# Patient Record
Sex: FEMALE | Race: WHITE | NOT HISPANIC OR LATINO | Employment: FULL TIME | URBAN - METROPOLITAN AREA
[De-identification: names, ages, dates, MRNs, and addresses within clinical notes are randomized per-mention and may not be internally consistent; named-entity substitution may affect disease eponyms.]

---

## 2017-01-13 ENCOUNTER — TELEPHONE (OUTPATIENT)
Dept: OBSTETRICS AND GYNECOLOGY | Facility: CLINIC | Age: 52
End: 2017-01-13

## 2017-01-13 DIAGNOSIS — Z92.89 HISTORY OF SCREENING MAMMOGRAPHY: Primary | ICD-10-CM

## 2017-01-13 NOTE — TELEPHONE ENCOUNTER
----- Message from Michelet Hicks sent at 1/13/2017  1:42 PM CST -----  Contact: pt  x_ 1st Request   _ 2nd Request   _ 3rd Request     Who: BRANDIN STEELE [[id number]]    Why: Pt is requesting that orders be placed for mammogram    What Number to Call Back: [phone]    When to Expect a call back: (Before the end of the day)   -- if call after 3:00 call back will be tomorrow.

## 2017-01-30 ENCOUNTER — OFFICE VISIT (OUTPATIENT)
Dept: OBSTETRICS AND GYNECOLOGY | Facility: CLINIC | Age: 52
End: 2017-01-30
Attending: OBSTETRICS & GYNECOLOGY
Payer: COMMERCIAL

## 2017-01-30 VITALS
BODY MASS INDEX: 16.88 KG/M2 | DIASTOLIC BLOOD PRESSURE: 70 MMHG | HEIGHT: 63 IN | WEIGHT: 95.25 LBS | SYSTOLIC BLOOD PRESSURE: 100 MMHG

## 2017-01-30 DIAGNOSIS — Z01.419 WELL WOMAN EXAM WITH ROUTINE GYNECOLOGICAL EXAM: Primary | ICD-10-CM

## 2017-01-30 PROCEDURE — 99999 PR PBB SHADOW E&M-EST. PATIENT-LVL III: CPT | Mod: PBBFAC,,, | Performed by: OBSTETRICS & GYNECOLOGY

## 2017-01-30 PROCEDURE — 99396 PREV VISIT EST AGE 40-64: CPT | Mod: S$GLB,,, | Performed by: OBSTETRICS & GYNECOLOGY

## 2017-01-30 PROCEDURE — 88175 CYTOPATH C/V AUTO FLUID REDO: CPT

## 2017-01-30 RX ORDER — NYSTATIN 100000 U/G
CREAM TOPICAL 3 TIMES DAILY
Qty: 30 G | Refills: 2 | Status: SHIPPED | OUTPATIENT
Start: 2017-01-30 | End: 2018-05-25

## 2017-01-30 RX ORDER — ALPRAZOLAM 0.5 MG/1
TABLET ORAL
COMMUNITY
Start: 2016-11-09 | End: 2018-05-25

## 2017-01-30 NOTE — MR AVS SNAPSHOT
"    [place]   [facility]  [place] [address]  Phone: [phone]                  Johana Wright   2017 4:00 PM   Office Visit    Description:  Female : 1965   Provider:  Emil Chu MD   Department:  [place]           Reason for Visit     Well Woman                To Do List           Future Appointments        Provider Department Dept Phone    2017 8:00 AM [facility] MAMMO2 SCREEN [facility] [phone]      Goals (5 Years of Data)     None      [facility]     [facility] Nurse Care Line -  Assistance  Registered nurses in the [facility] Center provide clinical advisement, health education, appointment booking, and other advisory services.  Call for this free service at [phone].             Medications           Message regarding Medications     Verify the changes and/or additions to your medication regime listed below are the same as discussed with your clinician today.  If any of these changes or additions are incorrect, please notify your healthcare provider.             Verify that the below list of medications is an accurate representation of the medications you are currently taking.  If none reported, the list may be blank. If incorrect, please contact your healthcare provider. Carry this list with you in case of emergency.           Current Medications     alprazolam (XANAX XR) 1 MG Tb24 Take 0.5 mg by mouth once daily.    alprazolam (XANAX) 0.5 MG tablet            Clinical Reference Information           Vital Signs - Last Recorded  Most recent update: 2017  4:03 PM by Kecia Kaur MA    BP Ht Wt BMI       100/70 5' 3" (1.6 m) 43.2 kg (95 lb 3.8 oz) 16.87 kg/m2       Blood Pressure          Most Recent Value    BP  100/70      Allergies as of 2017     Codeine    Pcn [Penicillins]      Immunizations Administered on Date of Encounter - 2017     None      [facility] Sign-Up  "    Activating your [facility] account is as easy as 1-2-3!     1) Visit [email or web address], select Sign Up Now, enter this activation code and your date of birth, then select Next.  [email or web address]  Expires: 3/16/2017  4:03 PM      2) Create a username and password to use when you visit [facility] in the future and select a security question in case you lose your password and select Next.    3) Enter your e-mail address and click Sign Up!    Additional Information  If you have questions, please e-mail [email or web address] or call [phone] to talk to our [facility] staff. Remember, [facility] is NOT to be used for urgent needs. For medical emergencies, dial 911.

## 2017-01-30 NOTE — PROGRESS NOTES
SUBJECTIVE:   51 y.o. female   for annual routine Pap and checkup. No LMP recorded. Patient is postmenopausal..      Past Medical History   Diagnosis Date    Breast disorder      Past Surgical History   Procedure Laterality Date    Tubal ligation      Breast surgery      Sinus surgery      Laser ablation of the cervix      Hand surgery      Tonsillectomy, adenoidectomy       Social History     Social History    Marital status:      Spouse name: N/A    Number of children: N/A    Years of education: N/A     Occupational History    Not on file.     Social History Main Topics    Smoking status: Never Smoker    Smokeless tobacco: Not on file    Alcohol use Yes    Drug use: No    Sexual activity: Yes     Partners: Male     Birth control/ protection: Surgical     Other Topics Concern    Not on file     Social History Narrative     History reviewed. No pertinent family history.  OB History    Para Term  AB SAB TAB Ectopic Multiple Living   2 0   2 2          # Outcome Date GA Lbr Shaheed/2nd Weight Sex Delivery Anes PTL Lv   2 SAB            1 SAB                   Current Outpatient Prescriptions   Medication Sig Dispense Refill    alprazolam (XANAX XR) 1 MG Tb24 Take 0.5 mg by mouth once daily.      alprazolam (XANAX) 0.5 MG tablet        No current facility-administered medications for this visit.      Allergies: Codeine and Pcn [penicillins]     CHIEF COMPLAINT: She has no unusual complaints.   Annual visit Yes      ROS:  Constitutional: no weight loss, weight gain, fever, fatigue  Eyes:  No vision changes, glasses/contacts  ENT/Mouth: No ulcers, sinus problems, ears ringing, headache  Cardiovascular: No inability to lie flat, chest pain, exercise intolerance, swelling, heart palpitations  Respiratory: No wheezing, coughing blood, shortness of breath, or cough  Gastrointestinal: No diarrhea, bloody stool, nausea/vomiting, constipation, gas, hemorrhoids  Genitourinary: No blood  "in urine, painful urination, urgency of urination, frequency of urination, incomplete emptying, incontinence, abnormal bleeding, painful periods, heavy periods, vaginal discharge, vaginal odor, painful intercourse, sexual problems, bleeding after intercourse.  Musculoskeletal: No muscle weakness  Skin/Breast: No painful breasts, nipple discharge, masses, rash, ulcers  Neurological: No passing out, seizures, numbness, headache  Endocrine: No diabetes, hypothyroid, hyperthyroid, hot flashes, hair loss, abnormal hair growth, ance  Psychiatric: No depression, crying  Hematologic: No bruises, bleeding, swollen lymph nodes, anemia.      OBJECTIVE:   The patient appears well, alert, oriented x 3, in no distress.  Visit Vitals    /70    Ht 5' 3" (1.6 m)    Wt 43.2 kg (95 lb 3.8 oz)    BMI 16.87 kg/m2     NECK: no thyromegaly, trachea midline  SKIN: no acne, striae, hirsutism  BREAST EXAM: breasts appear normal, no suspicious masses, no skin or nipple changes or axillary nodes  ABDOMEN: no hernias, masses, or hepatosplenomegaly  GENITALIA: normal external genitalia, no erythema, no discharge  URETHRA: normal urethra, normal urethral meatus  VAGINA: Normal  CERVIX: no lesions or cervical motion tenderness  UTERUS: normal size, contour, position, consistency, mobility, non-tender  ADNEXA: no mass, fullness, tenderness      ASSESSMENT:   1. Well woman exam with routine gynecological exam        PLAN:   pap smear  return annually or prn   "

## 2017-02-06 ENCOUNTER — TELEPHONE (OUTPATIENT)
Dept: OBSTETRICS AND GYNECOLOGY | Facility: CLINIC | Age: 52
End: 2017-02-06

## 2017-02-06 NOTE — LETTER
February 6, 2017    Johana Diaz [place]  [address] [facility] [address]  [facility] [address]             [place] [facility]  [address] [facility]  [place] [address]  Phone: [phone] Dear Ms. Wright:    The results of your most recent Pap smear are normal. This means that no cancerous or precancerous cells were seen. We recommend that you come back in 1 year for your annual exam and 1 years for your next Pap smear.    If you have any questions or concerns, please don't hesitate to call.    Sincerely,        Emil Pacheco MD

## 2017-02-22 ENCOUNTER — HOSPITAL ENCOUNTER (OUTPATIENT)
Dept: RADIOLOGY | Facility: OTHER | Age: 52
Discharge: HOME | End: 2017-02-22
Attending: OBSTETRICS & GYNECOLOGY
Payer: COMMERCIAL

## 2017-02-22 DIAGNOSIS — Z12.31 VISIT FOR SCREENING MAMMOGRAM: ICD-10-CM

## 2017-02-22 DIAGNOSIS — Z92.89 HISTORY OF SCREENING MAMMOGRAPHY: ICD-10-CM

## 2017-02-22 PROCEDURE — 77067 SCR MAMMO BI INCL CAD: CPT | Mod: 26,,, | Performed by: RADIOLOGY

## 2017-02-22 PROCEDURE — 77063 BREAST TOMOSYNTHESIS BI: CPT | Mod: 26,,, | Performed by: RADIOLOGY

## 2017-02-22 PROCEDURE — 77067 SCR MAMMO BI INCL CAD: CPT | Mod: TC

## 2017-02-23 ENCOUNTER — TELEPHONE (OUTPATIENT)
Dept: OBSTETRICS AND GYNECOLOGY | Facility: CLINIC | Age: 52
End: 2017-02-23

## 2017-02-23 NOTE — LETTER
February 23, 2017    Johana Wright  [address] [address]             [facility] [facility] [address]  [address] [facility] [address]  Phone: [phone] February 23, 2017     No Recipients    Patient: Johana Wright   Address: [address] [address]   YOB: 1965   Date of Visit: 2/23/2017       Dear Ms. Wright,    Your mammogram did not show any significant findings,  according to the radiologists interpretation.    Please remember that some cancers (about 10-15%) cannot be  found by mammography alone, and that early detection  requires a combination of monthly self-examination, yearly  physical examination, and periodic mammography.    Please continue regular breast self-examinations and report  any changes that concern you, even before your next  appointment.  If you have any further questions, please  contact your doctor.      Sincerely,        Emil Chu MD

## 2018-03-26 ENCOUNTER — OFFICE VISIT (OUTPATIENT)
Dept: URGENT CARE | Facility: CLINIC | Age: 53
End: 2018-03-26
Payer: COMMERCIAL

## 2018-03-26 VITALS
BODY MASS INDEX: 18.25 KG/M2 | OXYGEN SATURATION: 100 % | HEIGHT: 63 IN | WEIGHT: 103 LBS | TEMPERATURE: 97 F | RESPIRATION RATE: 16 BRPM | SYSTOLIC BLOOD PRESSURE: 138 MMHG | DIASTOLIC BLOOD PRESSURE: 84 MMHG | HEART RATE: 94 BPM

## 2018-03-26 DIAGNOSIS — S60.221A CONTUSION OF RIGHT HAND, INITIAL ENCOUNTER: Primary | ICD-10-CM
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
## 2018-03-26 PROCEDURE — 99203 OFFICE O/P NEW LOW 30 MIN: CPT | Mod: S$GLB,,, | Performed by: NURSE PRACTITIONER

## 2018-03-26 RX ORDER — NAPROXEN 500 MG/1
500 TABLET ORAL 2 TIMES DAILY WITH MEALS
Qty: 20 TABLET | Refills: 0 | Status: SHIPPED | OUTPATIENT
Start: 2018-03-26 | End: 2018-04-05

## 2018-03-26 NOTE — PROGRESS NOTES
"Subjective:       Patient ID: Johana Wright is a 53 y.o. female.    Vitals:  height is 5' 3" (1.6 m) and weight is 46.7 kg (103 lb). Her temperature is 97.2 °F (36.2 °C). Her blood pressure is 138/84 and her pulse is 94. Her respiration is 16 and oxygen saturation is 100%.     Chief Complaint: Hand Injury    Pt tripped and fell on Saturday, falling on her right hand. Pt pain and swelling to th area      Hand Injury    Her dominant hand is their right hand. The incident occurred 2 days ago. The incident occurred in the street. The injury mechanism was a fall. The pain is present in the right hand. The quality of the pain is described as shooting. The pain is at a severity of 6/10. The pain is moderate. The pain has been intermittent since the incident. Pertinent negatives include no chest pain. She has tried ice for the symptoms. The treatment provided mild relief.     Review of Systems   Constitution: Negative for chills and fever.   HENT: Negative for sore throat.    Eyes: Negative for blurred vision.   Cardiovascular: Negative for chest pain.   Respiratory: Negative for shortness of breath.    Skin: Negative for rash.   Musculoskeletal: Negative for back pain and joint pain.   Gastrointestinal: Negative for abdominal pain, diarrhea, nausea and vomiting.   Neurological: Negative for headaches.   Psychiatric/Behavioral: The patient is not nervous/anxious.        Objective:      Physical Exam   Constitutional: She is oriented to person, place, and time. She appears well-developed and well-nourished. She is cooperative.  Non-toxic appearance. She does not appear ill. No distress.   HENT:   Head: Normocephalic and atraumatic. Head is without abrasion, without contusion and without laceration.   Right Ear: Hearing, tympanic membrane, external ear and ear canal normal. No hemotympanum.   Left Ear: Hearing, tympanic membrane, external ear and ear canal normal. No hemotympanum.   Nose: Nose normal. No mucosal edema, " rhinorrhea or nasal deformity. No epistaxis. Right sinus exhibits no maxillary sinus tenderness and no frontal sinus tenderness. Left sinus exhibits no maxillary sinus tenderness and no frontal sinus tenderness.   Mouth/Throat: Uvula is midline, oropharynx is clear and moist and mucous membranes are normal. No trismus in the jaw. Normal dentition. No uvula swelling. No posterior oropharyngeal erythema.   Eyes: Conjunctivae, EOM and lids are normal. Pupils are equal, round, and reactive to light. Right eye exhibits no discharge. Left eye exhibits no discharge. No scleral icterus.   Sclera clear bilat   Neck: Trachea normal, normal range of motion, full passive range of motion without pain and phonation normal. Neck supple. No spinous process tenderness and no muscular tenderness present. No neck rigidity. No tracheal deviation present.   Cardiovascular: Normal rate, regular rhythm, normal heart sounds, intact distal pulses and normal pulses.    Pulmonary/Chest: Effort normal and breath sounds normal. No respiratory distress.   Abdominal: Soft. Normal appearance and bowel sounds are normal. She exhibits no distension, no pulsatile midline mass and no mass. There is no tenderness.   Musculoskeletal: Normal range of motion. She exhibits no edema or deformity.        Hands:  Neurological: She is alert and oriented to person, place, and time. She has normal strength.   Skin: Skin is warm, dry and intact. Capillary refill takes less than 2 seconds. No abrasion, no bruising, no burn, no ecchymosis and no laceration noted. She is not diaphoretic. No pallor.   Psychiatric: She has a normal mood and affect. Her speech is normal and behavior is normal. Judgment and thought content normal. Cognition and memory are normal.   Nursing note and vitals reviewed.      X-Ray Hand Complete Right 03/26/2018 fall. special attention to 3rd  head             RESULTS:  EXAMINATION:  XR HAND COMPLETE 3 VIEW RIGHT     CLINICAL  HISTORY:  fall. special attention to 3rd  head;  Unspecified fall, initial encounter     FINDINGS:  No fracture dislocation bone destruction seen.     IMPRESSION:      See above        Electronically signed by: Ryan Ralph MD  Date:                                            03/26/2018  Time:                                           09:43                Assessment:       1. Contusion of right hand, initial encounter    2. Fall, initial encounter        Plan:       RIGHT HAND WRAPPED WITH ELASTIC BANDAGE  Contusion of right hand, initial encounter  -     naproxen (NAPROSYN) 500 MG tablet; Take 1 tablet (500 mg total) by mouth 2 (two) times daily with meals.  Dispense: 20 tablet; Refill: 0    Fall, initial encounter  -     X-Ray Hand Complete Right; Future; Expected date: 03/26/2018      Patient Instructions   THE X-RAY OF YOUR HAND SHOWS NO FRACTURE.     Hand Contusion  You have a contusion. This is also called a bruise. There is swelling and some bleeding under the skin, but no broken bones. This injury generally takes a few days to a few weeks to heal.  During that time, the bruise will typically change in color from reddish, to purple-blue, to greenish-yellow, then to yellow-brown.  Home care  · Elevate the hand to reduce pain and swelling. As much as possible, sit or lie down with the hand raised about the level of your heart. This is especially important during the first 48 hours.  · Ice the hand to help reduce pain and swelling. Wrap a cold source (ice pack or ice cubes in a plastic bag) in a thin towel. Apply to the bruised area for 20 minutes every 1 to 2 hours the first day. Continue this 3 to 4 times a day until the pain and swelling goes away.  · Unless another medicine was prescribed, you can take acetaminophen, ibuprofen, or naproxen to control pain. (If you have chronic liver or kidney disease or ever had a stomach ulcer or gastrointestinal bleeding, talk with your doctor before using these  medicines.)  Follow up  Follow up with your healthcare provider or our staff as advised. Call if you are not improving within 1 to 2 weeks.  When to seek medical advice   Call your healthcare provider right away if you have any of the following:  · Increased pain or swelling  · Arm becomes cold, blue, numb or tingly  · Signs of infection: Warmth, drainage, or increased redness or pain around the bruise  · Inability to move the injured hand   · Frequent bruising for unknown reasons  Date Last Reviewed: 2/1/2017  © 5988-1786 [employer]. [address] [place] [id number]. All rights reserved. This information is not intended as a substitute for professional medical care. Always follow your healthcare professional's instructions.

## 2018-03-26 NOTE — PATIENT INSTRUCTIONS
THE X-RAY OF YOUR HAND SHOWS NO FRACTURE.     Hand Contusion  You have a contusion. This is also called a bruise. There is swelling and some bleeding under the skin, but no broken bones. This injury generally takes a few days to a few weeks to heal.  During that time, the bruise will typically change in color from reddish, to purple-blue, to greenish-yellow, then to yellow-brown.  Home care  · Elevate the hand to reduce pain and swelling. As much as possible, sit or lie down with the hand raised about the level of your heart. This is especially important during the first 48 hours.  · Ice the hand to help reduce pain and swelling. Wrap a cold source (ice pack or ice cubes in a plastic bag) in a thin towel. Apply to the bruised area for 20 minutes every 1 to 2 hours the first day. Continue this 3 to 4 times a day until the pain and swelling goes away.  · Unless another medicine was prescribed, you can take acetaminophen, ibuprofen, or naproxen to control pain. (If you have chronic liver or kidney disease or ever had a stomach ulcer or gastrointestinal bleeding, talk with your doctor before using these medicines.)  Follow up  Follow up with your healthcare provider or our staff as advised. Call if you are not improving within 1 to 2 weeks.  When to seek medical advice   Call your healthcare provider right away if you have any of the following:  · Increased pain or swelling  · Arm becomes cold, blue, numb or tingly  · Signs of infection: Warmth, drainage, or increased redness or pain around the bruise  · Inability to move the injured hand   · Frequent bruising for unknown reasons  Date Last Reviewed: 2/1/2017 © 2000-2017 [email or web address]. [address] [place] [id number]. All rights reserved. This information is not intended as a substitute for professional medical care. Always follow your healthcare professional's instructions.

## 2018-05-25 ENCOUNTER — HOSPITAL ENCOUNTER (OUTPATIENT)
Dept: RADIOLOGY | Facility: OTHER | Age: 53
Discharge: HOME | End: 2018-05-25
Attending: NURSE PRACTITIONER
Payer: COMMERCIAL

## 2018-05-25 ENCOUNTER — OFFICE VISIT (OUTPATIENT)
Dept: OBSTETRICS AND GYNECOLOGY | Facility: CLINIC | Age: 53
End: 2018-05-25
Payer: COMMERCIAL

## 2018-05-25 VITALS
HEIGHT: 63 IN | SYSTOLIC BLOOD PRESSURE: 120 MMHG | DIASTOLIC BLOOD PRESSURE: 80 MMHG | WEIGHT: 105.19 LBS | BODY MASS INDEX: 18.64 KG/M2

## 2018-05-25 DIAGNOSIS — R10.2 PELVIC PAIN: Primary | ICD-10-CM

## 2018-05-25 DIAGNOSIS — R10.2 PELVIC PAIN: ICD-10-CM

## 2018-05-25 DIAGNOSIS — N76.0 ACUTE VAGINITIS: ICD-10-CM

## 2018-05-25 LAB
CANDIDA RRNA VAG QL PROBE: NEGATIVE
G VAGINALIS RRNA GENITAL QL PROBE: NEGATIVE
T VAGINALIS RRNA GENITAL QL PROBE: NEGATIVE

## 2018-05-25 PROCEDURE — 99999 PR PBB SHADOW E&M-EST. PATIENT-LVL III: CPT | Mod: PBBFAC,,, | Performed by: NURSE PRACTITIONER

## 2018-05-25 PROCEDURE — 3008F BODY MASS INDEX DOCD: CPT | Mod: CPTII,S$GLB,, | Performed by: NURSE PRACTITIONER

## 2018-05-25 PROCEDURE — 99214 OFFICE O/P EST MOD 30 MIN: CPT | Mod: S$GLB,,, | Performed by: NURSE PRACTITIONER

## 2018-05-25 PROCEDURE — 76830 TRANSVAGINAL US NON-OB: CPT | Mod: 26,,, | Performed by: RADIOLOGY

## 2018-05-25 PROCEDURE — 76830 TRANSVAGINAL US NON-OB: CPT | Mod: TC

## 2018-05-25 PROCEDURE — 87510 GARDNER VAG DNA DIR PROBE: CPT

## 2018-05-25 PROCEDURE — 87480 CANDIDA DNA DIR PROBE: CPT

## 2018-05-25 PROCEDURE — 76856 US EXAM PELVIC COMPLETE: CPT | Mod: 26,,, | Performed by: RADIOLOGY

## 2018-05-25 RX ORDER — PAROXETINE 30 MG/1
TABLET, FILM COATED ORAL
COMMUNITY
Start: 2018-03-05 | End: 2019-06-19

## 2018-05-25 NOTE — PROGRESS NOTES
CC: Vaginal Discharge    Johana Wright is a 53 y.o. female  presents with complaint of vaginal white vaginal discharge for 2 days. Denies itching and odor. Discharge is associated with vaginal irritation and pelvic pain. Reports she used monistat 1 day 2 days ago without relief. Reports pelvic pain began bilaterally at 3/10, was only on the right side yesterday at 7/10, and is currently only right sided, rated 3/10. Reports uterine ablation 6-7 years ago. Reports she has not had a cycle since but experiences 'ovarian pain' 1-2 times a year. No new sexual partners.        ROS:  GENERAL: No fever, chills, fatigability or weight loss.  VULVAR: No pain, no lesions and no itching.  VAGINAL: No relaxation, no itching, + discharge, + irritation no abnormal bleeding and no lesions.  ABDOMEN: No abdominal pain. Denies nausea. Denies vomiting. No diarrhea. No constipation  BREAST: Denies pain. No lumps. No discharge.  URINARY: No incontinence, no nocturia, no frequency and no dysuria.  CARDIOVASCULAR: No chest pain. No shortness of breath. No leg cramps.    PHYSICAL EXAM:  VULVA: normal appearing vulva with no masses, tenderness or lesions   VAGINA: normal appearing vagina with normal color and + thick, white discharge that may be monistat, no lesions   CERVIX: normal appearing cervix without discharge or lesions   UTERUS: uterus is normal size, shape, consistency. Reports sharp pain 7/10 on palpation.  ADNEXA: normal adnexa in size, nontender and no masses. Denies pain on bilateral palpation.    ASSESSMENT and PLAN:    ICD-10-CM ICD-9-CM    1. Pelvic pain R10.2 [id number] US Pelvis Comp with Transvag NON-OB (xpd   2. Acute vaginitis N76.0 616.10 Vaginosis Screen by DNA Probe       Plan:  Affirm  Pelvic us    Patient was counseled today on vaginitis prevention including :  a. avoiding feminine products such as deoderant soaps, body wash, bubble bath, douches, scented toilet paper, deoderant tampons or pads,  feminine wipes, chronic pad use, etc.  b. avoiding other vulvovaginal irritants such as long hot baths, humidity, tight, synthetic clothing, chlorine and sitting around in wet bathing suits  c. wearing cotton underwear, avoiding thong underwear and no underwear to bed  d. taking showers instead of baths and use a hair dryer on cool setting afterwards to dry  e. wearing cotton to exercise and shower immediately after exercise and change clothes  f. using polyurethane condoms without spermicide if sexually active and symptoms are triggered by intercourse    FOLLOW UP: PRN lack of improvement.

## 2018-05-26 ENCOUNTER — NURSE TRIAGE (OUTPATIENT)
Dept: ADMINISTRATIVE | Facility: CLINIC | Age: 53
End: 2018-05-26

## 2018-05-26 ENCOUNTER — TELEPHONE (OUTPATIENT)
Dept: OBSTETRICS AND GYNECOLOGY | Facility: HOSPITAL | Age: 53
End: 2018-05-26

## 2018-05-26 NOTE — TELEPHONE ENCOUNTER
"OV 5/25  Pt needs prescription called in, would like test results, still in pain.     Reason for Disposition   [1] MILD-MODERATE pain AND [2] constant AND [3] present > 2 hours    Answer Assessment - Initial Assessment Questions  1. LOCATION: "Where does it hurt?"      Used monistat thurs. Seen in office yest. US normal. Pelvic pain about the same.   2. RADIATION: "Does the pain shoot anywhere else?" (e.g., lower back, groin, thighs)    No   3. ONSET: "When did the pain begin?" (e.g., minutes, hours or days ago)      5/24  4. SUDDEN: "Gradual or sudden onset?"     Gradual then got more intense   5. PATTERN "Does the pain come and go, or is it constant?"     - If constant: "Is it getting better, staying the same, or worsening?"       (Note: Constant means the pain never goes away completely; most serious pain is constant and gets worse over time)      - If intermittent: "How long does it last?" "Do you have pain now?"      (Note: Intermittent means the pain goes away completely between bouts)    Feel pain all the time, periods where its worse.   6. SEVERITY: "How bad is the pain?"  (e.g., Scale 1-10; mild, moderate, or severe)    - MILD (1-3): doesn't interfere with normal activities, area soft and not tender to touch     - MODERATE (4-7): interferes with normal activities or awakens from sleep, tender to touch     - SEVERE (8-10): excruciating pain, doubled over, unable to do any normal activities    4   7. RECURRENT SYMPTOM: "Have you ever had this type of pelvic pain before?" If so, ask: "When was the last time?" and "What happened that time?"      Yes, not this long   8. CAUSE: "What do you think is causing the pelvic pain?"     Yeast infection   9. RELIEVING/AGGRAVATING FACTORS: "What makes it better or worse?" (e.g., activity/rest, sexual intercourse, voiding, passing stool)    Has been taking ibuprofen, used midol. About to take it today. Last dose Fri after clinic   10. OTHER SYMPTOMS: "Has there been any " "other symptoms?" (e.g., fever, vaginal bleeding, diarrhea, constipation, or voiding problems?"    No    Protocols used:  PELVIC PAIN - FEMALE-A-  paging resident on call to speak with pt. Call back with questions.     "

## 2018-05-26 NOTE — TELEPHONE ENCOUNTER
Reports still having pain. Concerned for yeast. Affirm was negative. Reports taking monistat and has had some relief. Advised that she follow up next week or sooner if does not resolve, symptoms worsened, develops fever/chills/purulent vaginal discharge. Pt voiced understanding

## 2018-05-28 ENCOUNTER — TELEPHONE (OUTPATIENT)
Dept: OBSTETRICS AND GYNECOLOGY | Facility: CLINIC | Age: 53
End: 2018-05-28

## 2018-05-28 NOTE — TELEPHONE ENCOUNTER
Spoke with pt today. States her pelvic pain has improved over the last 2 days. She has been taking ibuprofen which has been helping. She states today her pain is a 0/10. Advised pt if symptoms return to give our office a call. Pt verbalized understanding. No further questions or concerns noted.

## 2018-07-11 ENCOUNTER — OFFICE VISIT (OUTPATIENT)
Dept: OBSTETRICS AND GYNECOLOGY | Facility: CLINIC | Age: 53
End: 2018-07-11
Attending: OBSTETRICS & GYNECOLOGY
Payer: COMMERCIAL

## 2018-07-11 VITALS
DIASTOLIC BLOOD PRESSURE: 60 MMHG | SYSTOLIC BLOOD PRESSURE: 108 MMHG | HEIGHT: 63 IN | BODY MASS INDEX: 18.52 KG/M2 | WEIGHT: 104.5 LBS

## 2018-07-11 DIAGNOSIS — Z12.39 BREAST CANCER SCREENING: ICD-10-CM

## 2018-07-11 DIAGNOSIS — Z01.419 WELL FEMALE EXAM WITH ROUTINE GYNECOLOGICAL EXAM: Primary | ICD-10-CM

## 2018-07-11 PROCEDURE — 99999 PR PBB SHADOW E&M-EST. PATIENT-LVL III: CPT | Mod: PBBFAC,,, | Performed by: OBSTETRICS & GYNECOLOGY

## 2018-07-11 PROCEDURE — 99396 PREV VISIT EST AGE 40-64: CPT | Mod: S$GLB,,, | Performed by: OBSTETRICS & GYNECOLOGY

## 2018-07-11 RX ORDER — OMEPRAZOLE 40 MG/1
CAPSULE, DELAYED RELEASE ORAL
COMMUNITY
Start: 2018-06-28 | End: 2019-03-27

## 2018-07-11 RX ORDER — TRETINOIN 0.5 MG/G
CREAM TOPICAL
COMMUNITY
Start: 2018-06-07 | End: 2019-03-27

## 2018-07-11 NOTE — PROGRESS NOTES
SUBJECTIVE:   53 y.o. female   for routine gyn exam. No LMP recorded. Patient has had an ablation..  She has no unusual complaints.  No HRT.         Past Medical History:   Diagnosis Date    Breast disorder      Past Surgical History:   Procedure Laterality Date    BREAST SURGERY      HAND SURGERY      LASER ABLATION OF THE CERVIX      SINUS SURGERY      TONSILLECTOMY, ADENOIDECTOMY      TUBAL LIGATION       Social History     Social History    Marital status:      Spouse name: N/A    Number of children: N/A    Years of education: N/A     Occupational History    Not on file.     Social History Main Topics    Smoking status: Never Smoker    Smokeless tobacco: Never Used    Alcohol use Yes    Drug use: No    Sexual activity: Yes     Partners: Male     Birth control/ protection: Surgical     Other Topics Concern    Not on file     Social History Narrative    No narrative on file     Family History   Problem Relation Age of Onset    Colon cancer Father 89    Breast cancer Neg Hx     Ovarian cancer Neg Hx      OB History    Para Term  AB Living   2 0     2     SAB TAB Ectopic Multiple Live Births   0 2            # Outcome Date GA Lbr Shaheed/2nd Weight Sex Delivery Anes PTL Lv   2 TAB            1 TAB                     Current Outpatient Prescriptions   Medication Sig Dispense Refill    omeprazole (PRILOSEC) 40 MG capsule       paroxetine (PAXIL) 30 MG tablet       tretinoin (RETIN-A) 0.05 % cream        No current facility-administered medications for this visit.      Allergies: Codeine     ROS:  Constitutional: no weight loss, weight gain, fever, fatigue  Eyes:  No vision changes, glasses/contacts  ENT/Mouth: No ulcers, sinus problems, ears ringing, headache  Cardiovascular: No inability to lie flat, chest pain, exercise intolerance, swelling, heart palpitations  Respiratory: No wheezing, coughing blood, shortness of breath, or cough  Gastrointestinal: No diarrhea,  "bloody stool, nausea/vomiting, constipation, gas, hemorrhoids  Genitourinary: No blood in urine, painful urination, urgency of urination, frequency of urination, incomplete emptying, incontinence, abnormal bleeding, painful periods, heavy periods, vaginal discharge, vaginal odor, painful intercourse, sexual problems, bleeding after intercourse.  Musculoskeletal: No muscle weakness  Skin/Breast: No painful breasts, nipple discharge, masses, rash, ulcers  Neurological: No passing out, seizures, numbness, headache  Endocrine: No diabetes, hypothyroid, hyperthyroid, hot flashes, hair loss, abnormal hair growth, ance  Psychiatric: No depression, crying  Hematologic: No bruises, bleeding, swollen lymph nodes, anemia.      OBJECTIVE:   The patient appears well, alert, oriented x 3, in no distress.  /60   Ht 5' 3" (1.6 m)   Wt 47.4 kg (104 lb 8 oz)   BMI 18.51 kg/m²   NECK: no thyromegaly, trachea midline  SKIN: no acne, striae, hirsutism  CHEST: CTAB  CV: RRR  BREAST EXAM: breasts appear normal, no suspicious masses, no skin or nipple changes or axillary nodes  ABDOMEN: no hernias, masses, or hepatosplenomegaly  GENITALIA: normal external genitalia, no erythema, no discharge  URETHRA: normal urethra, normal urethral meatus  VAGINA: Normal  CERVIX: no lesions or cervical motion tenderness  UTERUS: normal size, contour, position, consistency, mobility, non-tender  ADNEXA: no mass, fullness, tenderness      ASSESSMENT:   1. Well female exam with routine gynecological exam     2. Breast cancer screening  Mammo Digital Screening Bilat with Tomosynthesis CAD       PLAN:   Orders Placed This Encounter    Mammo Digital Screening Bilat with Tomosynthesis CAD     Return to clinic in 1 year  "

## 2018-07-23 ENCOUNTER — HOSPITAL ENCOUNTER (OUTPATIENT)
Dept: RADIOLOGY | Facility: OTHER | Age: 53
Discharge: HOME | End: 2018-07-23
Attending: OBSTETRICS & GYNECOLOGY
Payer: COMMERCIAL

## 2018-07-23 DIAGNOSIS — Z12.39 BREAST CANCER SCREENING: ICD-10-CM

## 2018-07-23 PROCEDURE — 77067 SCR MAMMO BI INCL CAD: CPT | Mod: 26,,, | Performed by: RADIOLOGY

## 2018-07-23 PROCEDURE — 77067 SCR MAMMO BI INCL CAD: CPT | Mod: TC

## 2018-07-23 PROCEDURE — 77063 BREAST TOMOSYNTHESIS BI: CPT | Mod: 26,,, | Performed by: RADIOLOGY

## 2019-06-19 ENCOUNTER — OFFICE VISIT (OUTPATIENT)
Dept: OBSTETRICS AND GYNECOLOGY | Facility: CLINIC | Age: 54
End: 2019-06-19
Attending: OBSTETRICS & GYNECOLOGY
Payer: COMMERCIAL

## 2019-06-19 VITALS
WEIGHT: 102.75 LBS | SYSTOLIC BLOOD PRESSURE: 105 MMHG | DIASTOLIC BLOOD PRESSURE: 60 MMHG | HEIGHT: 62 IN | BODY MASS INDEX: 18.91 KG/M2

## 2019-06-19 DIAGNOSIS — Z12.39 BREAST CANCER SCREENING: ICD-10-CM

## 2019-06-19 DIAGNOSIS — Z01.419 WELL FEMALE EXAM WITH ROUTINE GYNECOLOGICAL EXAM: Primary | ICD-10-CM

## 2019-06-19 PROCEDURE — 99396 PR PREVENTIVE VISIT,EST,40-64: ICD-10-PCS | Mod: S$GLB,,, | Performed by: OBSTETRICS & GYNECOLOGY

## 2019-06-19 PROCEDURE — 99999 PR PBB SHADOW E&M-EST. PATIENT-LVL III: CPT | Mod: PBBFAC,,, | Performed by: OBSTETRICS & GYNECOLOGY

## 2019-06-19 PROCEDURE — 88141 CYTOPATH C/V INTERPRET: CPT | Mod: ,,, | Performed by: PATHOLOGY

## 2019-06-19 PROCEDURE — 88141 LIQUID-BASED PAP SMEAR, SCREENING: ICD-10-PCS | Mod: ,,, | Performed by: PATHOLOGY

## 2019-06-19 PROCEDURE — 88175 CYTOPATH C/V AUTO FLUID REDO: CPT | Performed by: PATHOLOGY

## 2019-06-19 PROCEDURE — 99396 PREV VISIT EST AGE 40-64: CPT | Mod: S$GLB,,, | Performed by: OBSTETRICS & GYNECOLOGY

## 2019-06-19 PROCEDURE — 87624 HPV HI-RISK TYP POOLED RSLT: CPT

## 2019-06-19 PROCEDURE — 99999 PR PBB SHADOW E&M-EST. PATIENT-LVL III: ICD-10-PCS | Mod: PBBFAC,,, | Performed by: OBSTETRICS & GYNECOLOGY

## 2019-06-19 RX ORDER — PAROXETINE HYDROCHLORIDE 20 MG/1
TABLET, FILM COATED ORAL
COMMUNITY
Start: 2014-11-10 | End: 2024-03-05

## 2019-06-19 RX ORDER — TRETINOIN 0.5 MG/G
CREAM TOPICAL
Refills: 12 | COMMUNITY
Start: 2019-05-28

## 2019-06-19 NOTE — PROGRESS NOTES
SUBJECTIVE:   54 y.o. female   for routine gyn exam. No LMP recorded. Patient has had an ablation..  She has no unusual complaints.  No PMB.  No HRT.          Past Medical History:   Diagnosis Date    Breast disorder      Past Surgical History:   Procedure Laterality Date    BREAST SURGERY      HAND SURGERY      LASER ABLATION OF THE CERVIX      RECTAL PROLAPSE REPAIR  2019    SINUS SURGERY      TONSILLECTOMY, ADENOIDECTOMY      TUBAL LIGATION       Social History     Socioeconomic History    Marital status:      Spouse name: Not on file    Number of children: Not on file    Years of education: Not on file    Highest education level: Not on file   Occupational History    Not on file   Social Needs    Financial resource strain: Not on file    Food insecurity:     Worry: Not on file     Inability: Not on file    Transportation needs:     Medical: Not on file     Non-medical: Not on file   Tobacco Use    Smoking status: Never Smoker    Smokeless tobacco: Never Used   Substance and Sexual Activity    Alcohol use: Yes    Drug use: No    Sexual activity: Yes     Partners: Male     Birth control/protection: Surgical   Lifestyle    Physical activity:     Days per week: Not on file     Minutes per session: Not on file    Stress: Not on file   Relationships    Social connections:     Talks on phone: Not on file     Gets together: Not on file     Attends Congregation service: Not on file     Active member of club or organization: Not on file     Attends meetings of clubs or organizations: Not on file     Relationship status: Not on file   Other Topics Concern    Not on file   Social History Narrative    Not on file     Family History   Problem Relation Age of Onset    Colon cancer Father 89    Breast cancer Neg Hx     Ovarian cancer Neg Hx      OB History    Para Term  AB Living   2 0 0   2     SAB TAB Ectopic Multiple Live Births   0 2            # Outcome Date GA Lbr  "Shaheed/2nd Weight Sex Delivery Anes PTL Lv   2 TAB            1 TAB                  Current Outpatient Medications   Medication Sig Dispense Refill    paroxetine (PAXIL) 20 MG tablet       tretinoin (RETIN-A) 0.05 % cream   12     No current facility-administered medications for this visit.      Allergies: Adhesive and Penicillin     ROS:  Constitutional: no weight loss, weight gain, fever, fatigue  Eyes:  No vision changes, glasses/contacts  ENT/Mouth: No ulcers, sinus problems, ears ringing, headache  Cardiovascular: No inability to lie flat, chest pain, exercise intolerance, swelling, heart palpitations  Respiratory: No wheezing, coughing blood, shortness of breath, or cough  Gastrointestinal: No diarrhea, bloody stool, nausea/vomiting, constipation, gas, hemorrhoids  Genitourinary: No blood in urine, painful urination, urgency of urination, frequency of urination, incomplete emptying, incontinence, abnormal bleeding, painful periods, heavy periods, vaginal discharge, vaginal odor, painful intercourse, sexual problems, bleeding after intercourse.  Musculoskeletal: No muscle weakness  Skin/Breast: No painful breasts, nipple discharge, masses, rash, ulcers  Neurological: No passing out, seizures, numbness, headache  Endocrine: No diabetes, hypothyroid, hyperthyroid, hot flashes, hair loss, abnormal hair growth, ance  Psychiatric: No depression, crying  Hematologic: No bruises, bleeding, swollen lymph nodes, anemia.      OBJECTIVE:   The patient appears well, alert, oriented x 3, in no distress.  /60   Ht 5' 2" (1.575 m)   Wt 46.6 kg (102 lb 11.8 oz)   BMI 18.79 kg/m²   NECK: no thyromegaly, trachea midline  SKIN: no acne, striae, hirsutism  CHEST: CTAB  CV: RRR  BREAST EXAM: breasts appear normal, no suspicious masses, no skin or nipple changes or axillary nodes  ABDOMEN: no hernias, masses, or hepatosplenomegaly  GENITALIA: normal external genitalia, no erythema, no discharge  URETHRA: normal urethra, " normal urethral meatus  VAGINA: Normal  CERVIX: no lesions or cervical motion tenderness  UTERUS: normal size, contour, position, consistency, mobility, non-tender  ADNEXA: no mass, fullness, tenderness      ASSESSMENT:   1. Well female exam with routine gynecological exam  Liquid-based pap smear, screening   2. Breast cancer screening  Mammo Digital Screening Bilat w/ José Miguel       PLAN:   Orders Placed This Encounter    Mammo Digital Screening Bilat w/ José Miguel    Liquid-based pap smear, screening     Return to clinic in 1 year

## 2019-07-02 LAB
HPV HR 12 DNA CVX QL NAA+PROBE: POSITIVE
HPV16 AG SPEC QL: NEGATIVE
HPV18 DNA SPEC QL NAA+PROBE: NEGATIVE

## 2019-07-03 ENCOUNTER — TELEPHONE (OUTPATIENT)
Dept: OBSTETRICS AND GYNECOLOGY | Facility: CLINIC | Age: 54
End: 2019-07-03

## 2019-07-03 NOTE — TELEPHONE ENCOUNTER
Called pt No answer. Left message for CB    Abnormal PAP.  Please call patient and schedule colposcopy.

## 2019-07-03 NOTE — TELEPHONE ENCOUNTER
----- Message from Talisha Joshua MD sent at 7/2/2019  4:34 PM CDT -----  Abnormal PAP.  Please call patient and schedule colposcopy.

## 2019-07-03 NOTE — TELEPHONE ENCOUNTER
Left message to patient to call the office at [phone] regarding pap results and scheduling colposcopy per Dr. Joshua.

## 2019-07-18 ENCOUNTER — PROCEDURE VISIT (OUTPATIENT)
Dept: OBSTETRICS AND GYNECOLOGY | Facility: CLINIC | Age: 54
End: 2019-07-18
Attending: OBSTETRICS & GYNECOLOGY
Payer: COMMERCIAL

## 2019-07-18 VITALS
DIASTOLIC BLOOD PRESSURE: 80 MMHG | WEIGHT: 103.63 LBS | BODY MASS INDEX: 19.07 KG/M2 | HEIGHT: 62 IN | SYSTOLIC BLOOD PRESSURE: 122 MMHG

## 2019-07-18 DIAGNOSIS — Z01.812 PRE-PROCEDURE LAB EXAM: ICD-10-CM

## 2019-07-18 DIAGNOSIS — R87.610 ASCUS WITH POSITIVE HIGH RISK HPV CERVICAL: Primary | ICD-10-CM

## 2019-07-18 DIAGNOSIS — R87.810 ASCUS WITH POSITIVE HIGH RISK HPV CERVICAL: Primary | ICD-10-CM

## 2019-07-18 DIAGNOSIS — N88.2 STRICTURE AND STENOSIS OF CERVIX: ICD-10-CM

## 2019-07-18 LAB
B-HCG UR QL: NEGATIVE
CTP QC/QA: YES

## 2019-07-18 PROCEDURE — 81025 POCT URINE PREGNANCY: ICD-10-PCS | Mod: S$GLB,,, | Performed by: OBSTETRICS & GYNECOLOGY

## 2019-07-18 PROCEDURE — 57452 EXAM OF CERVIX W/SCOPE: CPT | Mod: S$GLB,,, | Performed by: OBSTETRICS & GYNECOLOGY

## 2019-07-18 PROCEDURE — 81025 URINE PREGNANCY TEST: CPT | Mod: S$GLB,,, | Performed by: OBSTETRICS & GYNECOLOGY

## 2019-07-18 PROCEDURE — 57452 COLPOSCOPY: ICD-10-PCS | Mod: S$GLB,,, | Performed by: OBSTETRICS & GYNECOLOGY

## 2019-07-19 NOTE — PROCEDURES
Colposcopy  Date/Time: 7/19/2019 6:31 PM  Performed by: Talisha Joshua MD  Authorized by: Talisha Joshua MD     Consent Done?:  Yes (Written)    Colposcopy Site:  Cervix  Position:  Supine  Acrowhite Lesion: No    Atypical Vessels: No    Transformation Zone Adequate?: No    Biopsy?: No    ECC Performed?: No    LEEP Performed?: No     Patient tolerated the procedure well with no immediate complications.    Prior cryo?  Cervical stenosis and unable to do ECC or even pass endocervical brush for PAP    Discussed option of LEEP for further evaluation and tx, vs observing / monitoring with repeat PAP.  Few ASCUS cells, + HR HPV  Plan repeat PAP in 6 months.  If persistent abnl PAP with unsatisfactory colpo, would then plan LEEP

## 2019-07-25 ENCOUNTER — OFFICE VISIT (OUTPATIENT)
Dept: OBSTETRICS AND GYNECOLOGY | Facility: CLINIC | Age: 54
End: 2019-07-25
Payer: COMMERCIAL

## 2019-07-25 VITALS
BODY MASS INDEX: 19.64 KG/M2 | SYSTOLIC BLOOD PRESSURE: 122 MMHG | DIASTOLIC BLOOD PRESSURE: 82 MMHG | WEIGHT: 107.38 LBS

## 2019-07-25 DIAGNOSIS — R39.15 URINARY URGENCY: Primary | ICD-10-CM

## 2019-07-25 PROCEDURE — 3008F BODY MASS INDEX DOCD: CPT | Mod: CPTII,S$GLB,, | Performed by: NURSE PRACTITIONER

## 2019-07-25 PROCEDURE — 99213 OFFICE O/P EST LOW 20 MIN: CPT | Mod: S$GLB,,, | Performed by: NURSE PRACTITIONER

## 2019-07-25 PROCEDURE — 99213 PR OFFICE/OUTPT VISIT, EST, LEVL III, 20-29 MIN: ICD-10-PCS | Mod: S$GLB,,, | Performed by: NURSE PRACTITIONER

## 2019-07-25 PROCEDURE — 99999 PR PBB SHADOW E&M-EST. PATIENT-LVL II: CPT | Mod: PBBFAC,,, | Performed by: NURSE PRACTITIONER

## 2019-07-25 PROCEDURE — 3008F PR BODY MASS INDEX (BMI) DOCUMENTED: ICD-10-PCS | Mod: CPTII,S$GLB,, | Performed by: NURSE PRACTITIONER

## 2019-07-25 PROCEDURE — 99999 PR PBB SHADOW E&M-EST. PATIENT-LVL II: ICD-10-PCS | Mod: PBBFAC,,, | Performed by: NURSE PRACTITIONER

## 2019-07-25 RX ORDER — SULFAMETHOXAZOLE AND TRIMETHOPRIM 800; 160 MG/1; MG/1
1 TABLET ORAL 2 TIMES DAILY
Qty: 6 TABLET | Refills: 0 | Status: SHIPPED | OUTPATIENT
Start: 2019-07-25 | End: 2019-07-28

## 2019-07-25 NOTE — PROGRESS NOTES
CC: Urinary discomfort    HPI: Pt is a 54 y.o.  female who presents for evaluation of her UTI symptoms.   The patient presents today with warmth, urinary discomfort,  and urgency for the past 2 days. The patient denies flank pain, fever, nausea, vomiting, and hematuria. She denies frequent or recurrent UTIs.   Alleviating factors: none    ROS:  GENERAL: Feeling well overall. Denies fever or chills.   SKIN: Denies rash or lesions.   HEAD: Denies head injury or headache.   NODES: Denies enlarged lymph nodes.   CHEST: Denies chest pain or shortness of breath.   CARDIOVASCULAR: Denies palpitations or left sided chest pain.   ABDOMEN: No abdominal pain, constipation, diarrhea, nausea, vomiting or rectal bleeding.   URINARY: + dysuria, hematuria, or burning on urination.  REPRODUCTIVE: See HPI.   BREASTS: Denies pain, lumps, or nipple discharge.   HEMATOLOGIC: No easy bruisability or excessive bleeding.   MUSCULOSKELETAL: Denies joint pain or swelling.   NEUROLOGIC: Denies syncope or weakness.   PSYCHIATRIC: Denies depression, anxiety or mood swings.    PE:   APPEARANCE: Well nourished, well developed, White female in no acute distress.  PELVIS: Deferred  ABDOMEN: No suprapubic tenderness  MUSCULOSKELETAL: No CVA tenderness      Diagnosis:  1. Urinary urgency        Plan:   U dip+ for leukocytes  Urine culture  Bactrim     Orders Placed This Encounter    Urine culture    POCT URINE DIPSTICK WITHOUT MICROSCOPE    sulfamethoxazole-trimethoprim 800-160mg (BACTRIM DS) 800-160 mg Tab       -UTI prevention including   a. perineal hygiene, wipe front to back,  b. drink water prior to intercourse and urinate afterwards and avoid certain positions which could increase likelyhood of UTIs,  c. establish regular bladder habits,   d. avoid vulvovaginal irritants,   e. increase fluids and avoid caffeine and alcohol;  -signs of pylenephritis and to go to the ED if develops fever, chills, n/v, back pain, worsening dyuria, hematuria;    -pelvic rest until symptoms resolve;  -Macrobid use and potential side effects;  -A.C.S. Pap and pelvic exam guidelines (pap every 3 years), recomendations for yearly mammogram;  -to follow up with her PCP for other health maintenance.       Follow-up PRN no resolution of symptoms.      Anna Pimentel, JAKP-C

## 2019-08-08 ENCOUNTER — TELEPHONE (OUTPATIENT)
Dept: OBSTETRICS AND GYNECOLOGY | Facility: CLINIC | Age: 54
End: 2019-08-08

## 2019-08-08 NOTE — TELEPHONE ENCOUNTER
Spoke with patient  No biopsy done.  See note that day.  Plan to repeat PAP in 6months.  Discussed could opt for LEEP now- declines

## 2019-08-08 NOTE — TELEPHONE ENCOUNTER
----- Message from Jeanette Viera MA sent at 8/8/2019 10:54 AM CDT -----  Contact: self  Patient is requesting colposcopy Biopsy results done 07/2019    Jeanette    ----- Message -----  From: Charisse Barnes  Sent: 8/8/2019  10:46 AM  To: [facility] Staff    Pt is calling to see if her biopsy results are in. Pt can be reached at [phone].

## 2019-08-28 ENCOUNTER — HOSPITAL ENCOUNTER (OUTPATIENT)
Dept: RADIOLOGY | Facility: OTHER | Age: 54
Discharge: HOME | End: 2019-08-28
Attending: OBSTETRICS & GYNECOLOGY
Payer: COMMERCIAL

## 2019-08-28 VITALS — WEIGHT: 107 LBS | HEIGHT: 62 IN | BODY MASS INDEX: 19.69 KG/M2

## 2019-08-28 DIAGNOSIS — Z12.39 BREAST CANCER SCREENING: ICD-10-CM

## 2019-08-28 PROCEDURE — 77063 MAMMO DIGITAL SCREENING BILAT WITH TOMOSYNTHESIS_CAD: ICD-10-PCS | Mod: 26,,, | Performed by: RADIOLOGY

## 2019-08-28 PROCEDURE — 77067 SCR MAMMO BI INCL CAD: CPT | Mod: 26,,, | Performed by: RADIOLOGY

## 2019-08-28 PROCEDURE — 77063 BREAST TOMOSYNTHESIS BI: CPT | Mod: 26,,, | Performed by: RADIOLOGY

## 2019-08-28 PROCEDURE — 77067 SCR MAMMO BI INCL CAD: CPT | Mod: TC

## 2019-08-28 PROCEDURE — 77067 MAMMO DIGITAL SCREENING BILAT WITH TOMOSYNTHESIS_CAD: ICD-10-PCS | Mod: 26,,, | Performed by: RADIOLOGY

## 2019-09-10 RX ORDER — PAROXETINE 30 MG/1
TABLET, FILM COATED ORAL
Qty: 90 TABLET | Refills: 0 | Status: SHIPPED | OUTPATIENT
Start: 2019-09-10 | End: 2020-06-29 | Stop reason: CLARIF

## 2020-01-08 ENCOUNTER — PATIENT OUTREACH (OUTPATIENT)
Dept: ADMINISTRATIVE | Facility: HOSPITAL | Age: 55
End: 2020-01-08

## 2020-01-30 ENCOUNTER — OFFICE VISIT (OUTPATIENT)
Dept: OBSTETRICS AND GYNECOLOGY | Facility: CLINIC | Age: 55
End: 2020-01-30
Attending: OBSTETRICS & GYNECOLOGY
Payer: COMMERCIAL

## 2020-01-30 VITALS
HEIGHT: 62 IN | DIASTOLIC BLOOD PRESSURE: 62 MMHG | SYSTOLIC BLOOD PRESSURE: 108 MMHG | WEIGHT: 108.94 LBS | BODY MASS INDEX: 20.05 KG/M2

## 2020-01-30 DIAGNOSIS — N88.2 CERVICAL STENOSIS (UTERINE CERVIX): ICD-10-CM

## 2020-01-30 DIAGNOSIS — Z87.42 HISTORY OF ABNORMAL CERVICAL PAP SMEAR: Primary | ICD-10-CM

## 2020-01-30 PROCEDURE — 99999 PR PBB SHADOW E&M-EST. PATIENT-LVL III: CPT | Mod: PBBFAC,,, | Performed by: OBSTETRICS & GYNECOLOGY

## 2020-01-30 PROCEDURE — 88141 CYTOPATH C/V INTERPRET: CPT | Mod: ,,, | Performed by: PATHOLOGY

## 2020-01-30 PROCEDURE — 99213 OFFICE O/P EST LOW 20 MIN: CPT | Mod: S$GLB,,, | Performed by: OBSTETRICS & GYNECOLOGY

## 2020-01-30 PROCEDURE — 88141 PR  CYTOPATH CERV/VAG INTERPRET: ICD-10-PCS | Mod: ,,, | Performed by: PATHOLOGY

## 2020-01-30 PROCEDURE — 88175 CYTOPATH C/V AUTO FLUID REDO: CPT | Performed by: PATHOLOGY

## 2020-01-30 PROCEDURE — 3008F BODY MASS INDEX DOCD: CPT | Mod: CPTII,S$GLB,, | Performed by: OBSTETRICS & GYNECOLOGY

## 2020-01-30 PROCEDURE — 87624 HPV HI-RISK TYP POOLED RSLT: CPT

## 2020-01-30 PROCEDURE — 99999 PR PBB SHADOW E&M-EST. PATIENT-LVL III: ICD-10-PCS | Mod: PBBFAC,,, | Performed by: OBSTETRICS & GYNECOLOGY

## 2020-01-30 PROCEDURE — 3008F PR BODY MASS INDEX (BMI) DOCUMENTED: ICD-10-PCS | Mod: CPTII,S$GLB,, | Performed by: OBSTETRICS & GYNECOLOGY

## 2020-01-30 PROCEDURE — 99213 PR OFFICE/OUTPT VISIT, EST, LEVL III, 20-29 MIN: ICD-10-PCS | Mod: S$GLB,,, | Performed by: OBSTETRICS & GYNECOLOGY

## 2020-01-30 NOTE — PROGRESS NOTES
SUBJECTIVE:   54 y.o. female   for follow up PAP. No LMP recorded. Patient has had an ablation..  Had ASCUS PAP + HR HPV -.  Had normal colposcopy- limited due to cervical stenosis, prior cryotherapy, and prior em ablation.  Given option of LEEP vs f/u PAP.  Agreed to repeat PAP.         Past Medical History:   Diagnosis Date    Abnormal Pap smear of cervix age 29    cryo    Breast disorder      Past Surgical History:   Procedure Laterality Date    BREAST SURGERY      HAND SURGERY      LASER ABLATION OF THE CERVIX  age 29    cryo ?    RECTAL PROLAPSE REPAIR  2019    SINUS SURGERY      TONSILLECTOMY, ADENOIDECTOMY      TUBAL LIGATION       Social History     Socioeconomic History    Marital status:      Spouse name: Not on file    Number of children: Not on file    Years of education: Not on file    Highest education level: Not on file   Occupational History    Not on file   Social Needs    Financial resource strain: Not on file    Food insecurity:     Worry: Not on file     Inability: Not on file    Transportation needs:     Medical: Not on file     Non-medical: Not on file   Tobacco Use    Smoking status: Never Smoker    Smokeless tobacco: Never Used   Substance and Sexual Activity    Alcohol use: Yes    Drug use: No    Sexual activity: Yes     Partners: Male     Birth control/protection: Surgical   Lifestyle    Physical activity:     Days per week: Not on file     Minutes per session: Not on file    Stress: Not on file   Relationships    Social connections:     Talks on phone: Not on file     Gets together: Not on file     Attends Taoist service: Not on file     Active member of club or organization: Not on file     Attends meetings of clubs or organizations: Not on file     Relationship status: Not on file   Other Topics Concern    Not on file   Social History Narrative    Not on file     Family History   Problem Relation Age of Onset    Colon cancer Father 89  "   Breast cancer Neg Hx     Ovarian cancer Neg Hx      OB History    Para Term  AB Living   2 0 0   2     SAB TAB Ectopic Multiple Live Births   0 2            # Outcome Date GA Lbr Shaheed/2nd Weight Sex Delivery Anes PTL Lv   2 TAB            1 TAB                  Current Outpatient Medications   Medication Sig Dispense Refill    paroxetine (PAXIL) 20 MG tablet       paroxetine (PAXIL) 30 MG tablet TAKE ONE DAILY 90 tablet 0    tretinoin (RETIN-A) 0.05 % cream   12     No current facility-administered medications for this visit.      Allergies: Adhesive     ROS:  Constitutional: no weight loss, weight gain, fever, fatigue  Eyes:  No vision changes, glasses/contacts  ENT/Mouth: No ulcers, sinus problems, ears ringing, headache  Cardiovascular: No inability to lie flat, chest pain, exercise intolerance, swelling, heart palpitations  Respiratory: No wheezing, coughing blood, shortness of breath, or cough  Gastrointestinal: No diarrhea, bloody stool, nausea/vomiting, constipation, gas, hemorrhoids  Genitourinary: No blood in urine, painful urination, urgency of urination, frequency of urination, incomplete emptying, incontinence, abnormal bleeding, painful periods, heavy periods, vaginal discharge, vaginal odor, painful intercourse, sexual problems, bleeding after intercourse.  Musculoskeletal: No muscle weakness  Skin/Breast: No painful breasts, nipple discharge, masses, rash, ulcers  Neurological: No passing out, seizures, numbness, headache  Endocrine: No diabetes, hypothyroid, hyperthyroid, hot flashes, hair loss, abnormal hair growth, ance  Psychiatric: No depression, crying  Hematologic: No bruises, bleeding, swollen lymph nodes, anemia.      OBJECTIVE:   The patient appears well, alert, oriented x 3, in no distress.  /62   Ht 5' 2" (1.575 m)   Wt 49.4 kg (108 lb 14.5 oz)   BMI 19.92 kg/m²   ABDOMEN: no hernias, masses, or hepatosplenomegaly  GENITALIA: normal external genitalia, no " erythema, no discharge  URETHRA: normal urethra, normal urethral meatus  VAGINA: Normal  CERVIX: no lesions or cervical motion tenderness  UTERUS: normal size, contour, position, consistency, mobility, non-tender  ADNEXA: no mass, fullness, tenderness      ASSESSMENT:   1. History of abnormal cervical Pap smear  Liquid-Based Pap Smear, Screening   2. Cervical stenosis (uterine cervix)         PLAN:   Orders Placed This Encounter    Liquid-Based Pap Smear, Screening     Discussed prior abnormal PAP, repeat today.  If normal PAP- plan f/u at E.  If abnormal again, consider LEEP.  Return to clinic 6 months

## 2020-02-24 LAB
FINAL PATHOLOGIC DIAGNOSIS: ABNORMAL
Lab: ABNORMAL

## 2020-02-28 LAB
HPV HR 12 DNA SPEC QL NAA+PROBE: POSITIVE
HPV16 AG SPEC QL: NEGATIVE
HPV18 DNA SPEC QL NAA+PROBE: NEGATIVE

## 2020-03-17 ENCOUNTER — PATIENT MESSAGE (OUTPATIENT)
Dept: OBSTETRICS AND GYNECOLOGY | Facility: CLINIC | Age: 55
End: 2020-03-17

## 2020-03-18 ENCOUNTER — PROCEDURE VISIT (OUTPATIENT)
Dept: OBSTETRICS AND GYNECOLOGY | Facility: CLINIC | Age: 55
End: 2020-03-18
Attending: OBSTETRICS & GYNECOLOGY
Payer: COMMERCIAL

## 2020-03-18 VITALS
SYSTOLIC BLOOD PRESSURE: 110 MMHG | WEIGHT: 111.31 LBS | BODY MASS INDEX: 20.48 KG/M2 | DIASTOLIC BLOOD PRESSURE: 70 MMHG | HEIGHT: 62 IN

## 2020-03-18 DIAGNOSIS — Z87.42 HISTORY OF ABNORMAL CERVICAL PAP SMEAR: ICD-10-CM

## 2020-03-18 DIAGNOSIS — R87.610 ASCUS WITH POSITIVE HIGH RISK HPV CERVICAL: Primary | ICD-10-CM

## 2020-03-18 DIAGNOSIS — Z01.812 PRE-PROCEDURE LAB EXAM: ICD-10-CM

## 2020-03-18 DIAGNOSIS — R87.810 ASCUS WITH POSITIVE HIGH RISK HPV CERVICAL: Primary | ICD-10-CM

## 2020-03-18 LAB
B-HCG UR QL: NEGATIVE
CTP QC/QA: YES

## 2020-03-18 PROCEDURE — 99214 PR OFFICE/OUTPT VISIT, EST, LEVL IV, 30-39 MIN: ICD-10-PCS | Mod: 25,S$GLB,, | Performed by: OBSTETRICS & GYNECOLOGY

## 2020-03-18 PROCEDURE — 81025 POCT URINE PREGNANCY: ICD-10-PCS | Mod: S$GLB,,, | Performed by: OBSTETRICS & GYNECOLOGY

## 2020-03-18 PROCEDURE — 88305 TISSUE EXAM BY PATHOLOGIST: ICD-10-PCS | Mod: 26,,, | Performed by: PATHOLOGY

## 2020-03-18 PROCEDURE — 81025 URINE PREGNANCY TEST: CPT | Mod: S$GLB,,, | Performed by: OBSTETRICS & GYNECOLOGY

## 2020-03-18 PROCEDURE — 57456 COLPOSCOPY: ICD-10-PCS | Mod: S$GLB,,, | Performed by: OBSTETRICS & GYNECOLOGY

## 2020-03-18 PROCEDURE — 88305 TISSUE EXAM BY PATHOLOGIST: CPT | Mod: 26,,, | Performed by: PATHOLOGY

## 2020-03-18 PROCEDURE — 88305 TISSUE EXAM BY PATHOLOGIST: CPT | Performed by: PATHOLOGY

## 2020-03-18 PROCEDURE — 57456 ENDOCERV CURETTAGE W/SCOPE: CPT | Mod: S$GLB,,, | Performed by: OBSTETRICS & GYNECOLOGY

## 2020-03-18 PROCEDURE — 99214 OFFICE O/P EST MOD 30 MIN: CPT | Mod: 25,S$GLB,, | Performed by: OBSTETRICS & GYNECOLOGY

## 2020-03-18 NOTE — PROGRESS NOTES
SUBJECTIVE:   54 y.o. female   for follow up on abnormal PAP smears.. No LMP recorded. Patient has had an ablation. PAP 19 was few ASCUS + HR HPV.  Colpo done  which showed inadequate TZ due to prior ablation(?).  Cervical stenosis presented ECC biopsy and endocervical brush.  Follow up PAP 20 was ASCUS + HR HPV.  Colpo done today was similar to .             Past Medical History:   Diagnosis Date    Abnormal Pap smear of cervix age 29    cryo    Breast disorder      Past Surgical History:   Procedure Laterality Date    BREAST SURGERY      HAND SURGERY      LASER ABLATION OF THE CERVIX  age 29    cryo ?    RECTAL PROLAPSE REPAIR  2019    SINUS SURGERY      TONSILLECTOMY, ADENOIDECTOMY      TUBAL LIGATION       Social History     Socioeconomic History    Marital status:      Spouse name: Not on file    Number of children: Not on file    Years of education: Not on file    Highest education level: Not on file   Occupational History    Not on file   Social Needs    Financial resource strain: Not on file    Food insecurity:     Worry: Not on file     Inability: Not on file    Transportation needs:     Medical: Not on file     Non-medical: Not on file   Tobacco Use    Smoking status: Never Smoker    Smokeless tobacco: Never Used   Substance and Sexual Activity    Alcohol use: Yes    Drug use: No    Sexual activity: Yes     Partners: Male     Birth control/protection: Surgical   Lifestyle    Physical activity:     Days per week: Not on file     Minutes per session: Not on file    Stress: Not on file   Relationships    Social connections:     Talks on phone: Not on file     Gets together: Not on file     Attends Mandaen service: Not on file     Active member of club or organization: Not on file     Attends meetings of clubs or organizations: Not on file     Relationship status: Not on file   Other Topics Concern    Not on file   Social History Narrative     Not on file     Family History   Problem Relation Age of Onset    Colon cancer Father 89    Breast cancer Neg Hx     Ovarian cancer Neg Hx      OB History    Para Term  AB Living   2 0 0   2     SAB TAB Ectopic Multiple Live Births   0 2            # Outcome Date GA Lbr Shaheed/2nd Weight Sex Delivery Anes PTL Lv   2 TAB            1 TAB                  Current Outpatient Medications   Medication Sig Dispense Refill    paroxetine (PAXIL) 20 MG tablet       paroxetine (PAXIL) 30 MG tablet TAKE ONE DAILY 90 tablet 0    tretinoin (RETIN-A) 0.05 % cream   12     No current facility-administered medications for this visit.      Allergies: Adhesive     ROS:  Constitutional: no weight loss, weight gain, fever, fatigue  Eyes:  No vision changes, glasses/contacts  ENT/Mouth: No ulcers, sinus problems, ears ringing, headache  Cardiovascular: No inability to lie flat, chest pain, exercise intolerance, swelling, heart palpitations  Respiratory: No wheezing, coughing blood, shortness of breath, or cough  Gastrointestinal: No diarrhea, bloody stool, nausea/vomiting, constipation, gas, hemorrhoids  Genitourinary: No blood in urine, painful urination, urgency of urination, frequency of urination, incomplete emptying, incontinence, abnormal bleeding, painful periods, heavy periods, vaginal discharge, vaginal odor, painful intercourse, sexual problems, bleeding after intercourse.  Musculoskeletal: No muscle weakness  Skin/Breast: No painful breasts, nipple discharge, masses, rash, ulcers  Neurological: No passing out, seizures, numbness, headache  Endocrine: No diabetes, hypothyroid, hyperthyroid, hot flashes, hair loss, abnormal hair growth, ance  Psychiatric: No depression, crying  Hematologic: No bruises, bleeding, swollen lymph nodes, anemia.      OBJECTIVE:   The patient appears well, alert, oriented x 3, in no distress.  /70 (BP Location: Left arm, Patient Position: Sitting, BP Method: Medium (Manual))    "Ht 5' 2" (1.575 m)   Wt 50.5 kg (111 lb 5.3 oz)   BMI 20.36 kg/m²   ABDOMEN: no hernias, masses, or hepatosplenomegaly  GENITALIA: normal external genitalia, no erythema, no discharge  URETHRA: normal urethra, normal urethral meatus  VAGINA: Normal  CERVIX: no lesions or cervical motion tenderness  UTERUS: normal size, contour, position, consistency, mobility, non-tender  ADNEXA: no mass, fullness, tenderness      ASSESSMENT:   1. ASCUS with positive high risk HPV cervical  Specimen to Pathology, Ob/Gyn    Biopsy    Colposcopy   2. History of abnormal cervical Pap smear  Colposcopy   3. Pre-procedure lab exam  POCT urine pregnancy       PLAN:   Orders Placed This Encounter    Biopsy    Colposcopy    POCT urine pregnancy    Specimen to Pathology, Ob/Gyn     Discussed h/o abnormal PAPs 6-2019 and 1-2020.  Inadequate TZ zone on colpo and cervical; stenosis prevented ECC bx.  Recommend LEEP or CKC.  Discussed pros/cons/ risks/ benefits of each.  Since Covid 19- will unable to go to OR for GETA in the next few weeks- possibly could schedule June 2020.  May do LEEP in office as early as next week.  Patient considering.  Return to clinic   "

## 2020-03-18 NOTE — PROCEDURES
Colposcopy  Date/Time: 3/18/2020 1:00 PM  Performed by: Talisha oJshua MD  Authorized by: Talisha Joshua MD       Colposcopy Site:  Cervix  Position:  Supine  Transformation Zone Adequate?: No    Biopsy?: No    ECC Performed?: Yes

## 2020-03-24 LAB — FINAL PATHOLOGIC DIAGNOSIS: NORMAL

## 2020-05-07 ENCOUNTER — TELEPHONE (OUTPATIENT)
Dept: OBSTETRICS AND GYNECOLOGY | Facility: CLINIC | Age: 55
End: 2020-05-07

## 2020-05-07 DIAGNOSIS — R87.610 PAP SMEAR ABNORMALITY OF CERVIX WITH ASCUS FAVORING BENIGN: Primary | ICD-10-CM

## 2020-05-07 DIAGNOSIS — N88.2 CERVICAL STENOSIS (UTERINE CERVIX): ICD-10-CM

## 2020-05-07 DIAGNOSIS — Z01.812 PRE-PROCEDURE LAB EXAM: Primary | ICD-10-CM

## 2020-06-29 ENCOUNTER — ANESTHESIA EVENT (OUTPATIENT)
Dept: SURGERY | Facility: OTHER | Age: 55
End: 2020-06-29
Payer: COMMERCIAL

## 2020-06-29 ENCOUNTER — OFFICE VISIT (OUTPATIENT)
Dept: OBSTETRICS AND GYNECOLOGY | Facility: CLINIC | Age: 55
End: 2020-06-29
Attending: OBSTETRICS & GYNECOLOGY
Payer: COMMERCIAL

## 2020-06-29 ENCOUNTER — HOSPITAL ENCOUNTER (OUTPATIENT)
Dept: PREADMISSION TESTING | Facility: OTHER | Age: 55
Discharge: HOME | End: 2020-06-29
Attending: OBSTETRICS & GYNECOLOGY
Payer: COMMERCIAL

## 2020-06-29 VITALS
HEART RATE: 74 BPM | SYSTOLIC BLOOD PRESSURE: 134 MMHG | RESPIRATION RATE: 16 BRPM | TEMPERATURE: 97 F | WEIGHT: 110 LBS | OXYGEN SATURATION: 98 % | BODY MASS INDEX: 20.24 KG/M2 | HEIGHT: 62 IN | DIASTOLIC BLOOD PRESSURE: 79 MMHG

## 2020-06-29 VITALS
BODY MASS INDEX: 20.65 KG/M2 | HEIGHT: 62 IN | SYSTOLIC BLOOD PRESSURE: 120 MMHG | WEIGHT: 112.19 LBS | DIASTOLIC BLOOD PRESSURE: 80 MMHG

## 2020-06-29 DIAGNOSIS — N88.2 CERVICAL STENOSIS (UTERINE CERVIX): ICD-10-CM

## 2020-06-29 DIAGNOSIS — R87.615 INSUFFICIENT ENDOCERVICAL OR TRANSFORMATION ZONE COMPONENT IN CERVICAL SPECIMEN: ICD-10-CM

## 2020-06-29 DIAGNOSIS — Z01.818 PREOPERATIVE EXAM FOR GYNECOLOGIC SURGERY: ICD-10-CM

## 2020-06-29 DIAGNOSIS — Z01.818 PREOP TESTING: Primary | ICD-10-CM

## 2020-06-29 DIAGNOSIS — Z87.42 HISTORY OF ABNORMAL CERVICAL PAP SMEAR: Primary | ICD-10-CM

## 2020-06-29 LAB
BASOPHILS # BLD AUTO: 0.08 K/UL (ref 0–0.2)
BASOPHILS NFR BLD: 1.6 % (ref 0–1.9)
DIFFERENTIAL METHOD: ABNORMAL
EOSINOPHIL # BLD AUTO: 0.7 K/UL (ref 0–0.5)
EOSINOPHIL NFR BLD: 13.6 % (ref 0–8)
ERYTHROCYTE [DISTWIDTH] IN BLOOD BY AUTOMATED COUNT: 12.9 % (ref 11.5–14.5)
HCT VFR BLD AUTO: 41.2 % (ref 37–48.5)
HGB BLD-MCNC: 13.1 G/DL (ref 12–16)
IMM GRANULOCYTES # BLD AUTO: 0.02 K/UL (ref 0–0.04)
IMM GRANULOCYTES NFR BLD AUTO: 0.4 % (ref 0–0.5)
LYMPHOCYTES # BLD AUTO: 1.1 K/UL (ref 1–4.8)
LYMPHOCYTES NFR BLD: 21.4 % (ref 18–48)
MCH RBC QN AUTO: 31.6 PG (ref 27–31)
MCHC RBC AUTO-ENTMCNC: 31.8 G/DL (ref 32–36)
MCV RBC AUTO: 100 FL (ref 82–98)
MONOCYTES # BLD AUTO: 0.5 K/UL (ref 0.3–1)
MONOCYTES NFR BLD: 10.6 % (ref 4–15)
NEUTROPHILS # BLD AUTO: 2.6 K/UL (ref 1.8–7.7)
NEUTROPHILS NFR BLD: 52.4 % (ref 38–73)
NRBC BLD-RTO: 0 /100 WBC
PLATELET # BLD AUTO: 220 K/UL (ref 150–350)
PMV BLD AUTO: 10.2 FL (ref 9.2–12.9)
RBC # BLD AUTO: 4.14 M/UL (ref 4–5.4)
WBC # BLD AUTO: 4.91 K/UL (ref 3.9–12.7)

## 2020-06-29 PROCEDURE — 85025 COMPLETE CBC W/AUTO DIFF WBC: CPT

## 2020-06-29 PROCEDURE — 99214 OFFICE O/P EST MOD 30 MIN: CPT | Mod: 57,S$GLB,, | Performed by: OBSTETRICS & GYNECOLOGY

## 2020-06-29 PROCEDURE — 99999 PR PBB SHADOW E&M-EST. PATIENT-LVL III: CPT | Mod: PBBFAC,,, | Performed by: OBSTETRICS & GYNECOLOGY

## 2020-06-29 PROCEDURE — 3008F PR BODY MASS INDEX (BMI) DOCUMENTED: ICD-10-PCS | Mod: CPTII,S$GLB,, | Performed by: OBSTETRICS & GYNECOLOGY

## 2020-06-29 PROCEDURE — 99999 PR PBB SHADOW E&M-EST. PATIENT-LVL III: ICD-10-PCS | Mod: PBBFAC,,, | Performed by: OBSTETRICS & GYNECOLOGY

## 2020-06-29 PROCEDURE — 99214 PR OFFICE/OUTPT VISIT, EST, LEVL IV, 30-39 MIN: ICD-10-PCS | Mod: 57,S$GLB,, | Performed by: OBSTETRICS & GYNECOLOGY

## 2020-06-29 PROCEDURE — 36415 COLL VENOUS BLD VENIPUNCTURE: CPT

## 2020-06-29 PROCEDURE — 3008F BODY MASS INDEX DOCD: CPT | Mod: CPTII,S$GLB,, | Performed by: OBSTETRICS & GYNECOLOGY

## 2020-06-29 RX ORDER — PREGABALIN 50 MG/1
50 CAPSULE ORAL
Status: CANCELLED | OUTPATIENT
Start: 2020-06-29 | End: 2020-06-29

## 2020-06-29 RX ORDER — SCOLOPAMINE TRANSDERMAL SYSTEM 1 MG/1
1 PATCH, EXTENDED RELEASE TRANSDERMAL ONCE
Status: CANCELLED | OUTPATIENT
Start: 2020-06-29 | End: 2020-06-29

## 2020-06-29 RX ORDER — SODIUM CHLORIDE, SODIUM LACTATE, POTASSIUM CHLORIDE, CALCIUM CHLORIDE 600; 310; 30; 20 MG/100ML; MG/100ML; MG/100ML; MG/100ML
INJECTION, SOLUTION INTRAVENOUS CONTINUOUS
Status: CANCELLED | OUTPATIENT
Start: 2020-06-29

## 2020-06-29 RX ORDER — LIDOCAINE HYDROCHLORIDE 10 MG/ML
0.5 INJECTION, SOLUTION EPIDURAL; INFILTRATION; INTRACAUDAL; PERINEURAL ONCE
Status: CANCELLED | OUTPATIENT
Start: 2020-06-29 | End: 2020-06-29

## 2020-06-29 RX ORDER — ACETAMINOPHEN 500 MG
1000 TABLET ORAL
Status: CANCELLED | OUTPATIENT
Start: 2020-06-29 | End: 2020-06-29

## 2020-06-29 RX ORDER — CELECOXIB 200 MG/1
400 CAPSULE ORAL
Status: CANCELLED | OUTPATIENT
Start: 2020-06-29 | End: 2020-06-29

## 2020-06-29 RX ORDER — MIDAZOLAM HYDROCHLORIDE 1 MG/ML
2 INJECTION INTRAMUSCULAR; INTRAVENOUS
Status: CANCELLED | OUTPATIENT
Start: 2020-06-29 | End: 2020-06-29

## 2020-06-29 NOTE — ANESTHESIA PREPROCEDURE EVALUATION
06/29/2020  Johana Wright is a 55 y.o., female.    Anesthesia Evaluation    I have reviewed the Patient Summary Reports.    I have reviewed the Nursing Notes. I have reviewed the NPO Status.      Review of Systems  Anesthesia Hx:  No problems with previous Anesthesia  History of prior surgery of interest to airway management or planning: Denies Family Hx of Anesthesia complications.  Personal Hx of Anesthesia complications, Post-Operative Nausea/Vomiting, with every anesthetic, treatment not known   Social:  Non-Smoker    Hematology/Oncology:  Hematology Normal   Oncology Normal     EENT/Dental:EENT/Dental Normal   Cardiovascular:   Exercise tolerance: good    Pulmonary:  Pulmonary Normal    Renal/:  Renal/ Normal     Hepatic/GI:  Hepatic/GI Normal    Musculoskeletal:  Musculoskeletal Normal    Neurological:  Neurology Normal    Endocrine:  Endocrine Normal    Dermatological:  Skin Normal    Psych:   Psychiatric History          Physical Exam  General:  Well nourished    Airway/Jaw/Neck:  Airway Findings: Mouth Opening: Normal Tongue: Normal  General Airway Assessment: Adult  Mallampati: I      Dental:  Dental Findings: Molar caps        Mental Status:  Mental Status Findings:  Cooperative, Alert and Oriented         Anesthesia Plan  Type of Anesthesia, risks & benefits discussed:  Anesthesia Type:  general  Patient's Preference:   Intra-op Monitoring Plan: standard ASA monitors  Intra-op Monitoring Plan Comments:   Post Op Pain Control Plan: multimodal analgesia  Post Op Pain Control Plan Comments:   Induction:   IV  Beta Blocker:         Informed Consent: Patient understands risks and agrees with Anesthesia plan.  Questions answered.   ASA Score: 1     Day of Surgery Review of History & Physical:    H&P update referred to the surgeon.     Anesthesia Plan Notes: PONV,consider TIVA        Ready For  Surgery From Anesthesia Perspective.

## 2020-06-29 NOTE — DISCHARGE INSTRUCTIONS
Information to Prepare you for your Surgery    PRE-ADMIT TESTING -  [phone]    [address]  [facility]          Your surgery has been scheduled at [facility]. We are pleased to have the opportunity to serve you. For Further Information please call [phone].    On the day of surgery please report to the Information Desk on the 1st floor.    · CONTACT YOUR PHYSICIAN'S OFFICE THE DAY PRIOR TO YOUR SURGERY TO OBTAIN YOUR ARRIVAL TIME.     · The evening before surgery do not eat anything after 9 p.m. ( this includes hard candy, chewing gum and mints).  You may only have GATORADE, POWERADE AND WATER  from 9 p.m. until you leave your home.   DO NOT DRINK ANY LIQUIDS ON THE WAY TO THE HOSPITAL.      SPECIAL MEDICATION INSTRUCTIONS: TAKE medications checked off by the Anesthesiologist on your Medication List.    Angiogram Patients: Take medications as instructed by your physician, including aspirin.     Surgery Patients:    If you take ASPIRIN - Your PHYSICIAN/SURGEON will need to inform you IF/OR when you need to stop taking aspirin prior to your surgery.     Do Not take any medications containing IBUPROFEN.  Do Not Wear any make-up or dark nail polish   (especially eye make-up) to surgery. If you come to surgery with makeup on you will be required to remove the makeup or nail polish.    Do not shave your surgical area at least 5 days prior to your surgery. The surgical prep will be performed at the hospital according to Infection Control regulations.    Leave all valuables at home.   Do Not wear any jewelry or watches, including any metal in body piercings. Jewelry must be removed prior to coming to the hospital.  There is a possibility that rings that are unable to be removed may be cut off if they are on the surgical extremity.    Contact Lens must be removed before surgery. Either do not wear the contact lens or bring a case and solution for  storage.  Please bring a container for eyeglasses or dentures as required.  Bring any paperwork your physician has provided, such as consent forms,  history and physicals, doctor's orders, etc.   Bring comfortable clothes that are loose fitting to wear upon discharge. Take into consideration the type of surgery being performed.  Maintain your diet as advised per your physician the day prior to surgery.      Adequate rest the night before surgery is advised.   Park in the Parking lot behind the hospital or in the [facility] across the street from the parking lot. Parking is complimentary.  If you will be discharged the same day as your procedure, please arrange for a responsible adult to drive you home or to accompany you if traveling by taxi.   YOU WILL NOT BE PERMITTED TO DRIVE OR TO LEAVE THE HOSPITAL ALONE AFTER SURGERY.   If you are being discharged the same day, it is strongly recommended that you arrange for someone to remain with you for the first 24 hrs following your surgery.    The Surgeon will speak to your family/visitor after your surgery regarding the outcome of your surgery and post op care.  The Surgeon may speak to you after your surgery, but there is a possibility you may not remember the details.  Please check with your family members regarding the conversation with the Surgeon.    We strongly recommend whoever is bringing you home be present for discharge instructions.  This will ensure a thorough understanding for your post op home care.    ALL CHILDREN MUST ALWAYS BE ACCOMPANIED BY AN ADULT.    Visitors-Refer to current Visitor policy handouts.    Thank you for your cooperation.  The Staff of [facility].                Bathing Instructions with Hibiclens     Shower the evening before and morning of your procedure with Hibiclens:   Wash your face with water and your regular face wash/soap   Apply Hibiclens directly on your skin or on a wet washcloth and wash  gently. When showering: Move away from the shower stream when applying Hibiclens to avoid rinsing off too soon.   Rinse thoroughly with warm water   Do not dilute Hibiclens         Dry off as usual, do not use any deodorant, powder, body lotions, perfume, after shave or cologne.

## 2020-06-30 NOTE — PROGRESS NOTES
"SUBJECTIVE:   55 y.o. female   For recurrent abnormal PAPs. No LMP recorded. Patient is postmenopausal..  Had normal PAP [id number].  Then started with abnormal PAPs as listed below.   ASCUS PAP + HR HPV   "Normal" but unsatisfactory colposcopy with TZ zone not seen - limited due to cervical stenosis, prior cryotherapy, and prior em ablation.  ECC unable to be done.     ASCUS PAP +HR HPV  3-2020 "Normal" but unsatisfactory colposcopy with TZ zone not seen - limited due to cervical stenosis, prior cryotherapy, and prior em ablation.  ECC unable to be done.    Discussed recurrent abnormal PAP, cervical stenosis, unsatisfactory colposcopy and mgt options.  May continue to monitor with PAPs every 6 months.  Discussed risk of cervical dysplasia and difficulty with evaluation due to anatomy.  Offered office LEEP vs surgery in the OR.  Desires surgery in OR.  Plan for CKC in OR.  She has no unusual complaints          Past Medical History:   Diagnosis Date    Abnormal Pap smear of cervix age 29    cryo    Breast disorder      Past Surgical History:   Procedure Laterality Date    BREAST SURGERY      HAND SURGERY      LASER ABLATION OF THE CERVIX  age 29    cryo ?    RECTAL PROLAPSE REPAIR  2019    SINUS SURGERY      TONSILLECTOMY, ADENOIDECTOMY      TUBAL LIGATION       Social History     Socioeconomic History    Marital status:      Spouse name: Not on file    Number of children: Not on file    Years of education: Not on file    Highest education level: Not on file   Occupational History    Not on file   Social Needs    Financial resource strain: Not on file    Food insecurity     Worry: Not on file     Inability: Not on file    Transportation needs     Medical: Not on file     Non-medical: Not on file   Tobacco Use    Smoking status: Never Smoker    Smokeless tobacco: Never Used   Substance and Sexual Activity    Alcohol use: Yes     Comment: social    Drug use: No "    Sexual activity: Yes     Partners: Male     Birth control/protection: Surgical   Lifestyle    Physical activity     Days per week: Not on file     Minutes per session: Not on file    Stress: Not on file   Relationships    Social connections     Talks on phone: Not on file     Gets together: Not on file     Attends [employer] service: Not on file     Active member of club or organization: Not on file     Attends meetings of clubs or organizations: Not on file     Relationship status: Not on file   Other Topics Concern    Not on file   Social History Narrative    Not on file     Family History   Problem Relation Age of Onset    Colon cancer Father 89    Breast cancer Neg Hx     Ovarian cancer Neg Hx      OB History    Para Term  AB Living   2 0 0   2 0   SAB TAB Ectopic Multiple Live Births   0 2     0      # Outcome Date GA Lbr Shaheed/2nd Weight Sex Delivery Anes PTL Lv   2 TAB            1 TAB                  Current Outpatient Medications   Medication Sig Dispense Refill    paroxetine (PAXIL) 20 MG tablet       tretinoin (RETIN-A) 0.05 % cream   12     No current facility-administered medications for this visit.      Allergies: Adhesive     ROS:  Constitutional: no weight loss, weight gain, fever, fatigue  Eyes:  No vision changes, glasses/contacts  ENT/Mouth: No ulcers, sinus problems, ears ringing, headache  Cardiovascular: No inability to lie flat, chest pain, exercise intolerance, swelling, heart palpitations  Respiratory: No wheezing, coughing blood, shortness of breath, or cough  Gastrointestinal: No diarrhea, bloody stool, nausea/vomiting, constipation, gas, hemorrhoids  Genitourinary: No blood in urine, painful urination, urgency of urination, frequency of urination, incomplete emptying, incontinence, abnormal bleeding, painful periods, heavy periods, vaginal discharge, vaginal odor, painful intercourse, sexual problems, bleeding after intercourse.  Musculoskeletal: No muscle  "weakness  Skin/Breast: No painful breasts, nipple discharge, masses, rash, ulcers  Neurological: No passing out, seizures, numbness, headache  Endocrine: No diabetes, hypothyroid, hyperthyroid, hot flashes, hair loss, abnormal hair growth, acne  Psychiatric: No depression, crying  Hematologic: No bruises, bleeding, swollen lymph nodes, anemia.      OBJECTIVE:   The patient appears well, alert, oriented x 3, in no distress.  /80 (BP Location: Right arm, Patient Position: Sitting, BP Method: Medium (Manual))   Ht 5' 2" (1.575 m)   Wt 50.9 kg (112 lb 3.4 oz)   BMI 20.52 kg/m²   NECK: no thyromegaly, trachea midline  SKIN: no acne, striae, hirsutism  CHEST: CTAB  CV: RRR  BREAST EXAM: Deferred  ABDOMEN: no hernias, masses, or hepatosplenomegaly  GENITALIA: normal external genitalia, no erythema, no discharge  URETHRA: normal urethra, normal urethral meatus  VAGINA: Normal  CERVIX: no lesions or cervical motion tenderness  UTERUS: normal size, contour, position, consistency, mobility, non-tender  ADNEXA: no mass, fullness, tenderness      ASSESSMENT:   1. Preoperative exam for gynecologic surgery  Vaginosis Screen by DNA Probe   2. History of abnormal cervical Pap smear     3. Cervical stenosis (uterine cervix)         PLAN:   Orders Placed This Encounter    Vaginosis Screen by DNA Probe     Discussed recurrent abnormal PAPs, inability for complete evaluation with colposcopy exam due to TZ not seen (prior cryo) and stenosis, and mgt options- continued observation vs LEEP or CKC.  Desires CKC in OR.  Consents signed.  Return to clinic for post op    "

## 2020-07-07 ENCOUNTER — HOSPITAL ENCOUNTER (OUTPATIENT)
Dept: PREADMISSION TESTING | Facility: OTHER | Age: 55
Discharge: HOME | End: 2020-07-07
Attending: OBSTETRICS & GYNECOLOGY
Payer: COMMERCIAL

## 2020-07-07 DIAGNOSIS — Z01.812 PRE-PROCEDURE LAB EXAM: ICD-10-CM

## 2020-07-07 PROBLEM — R87.615 INSUFFICIENT ENDOCERVICAL OR TRANSFORMATION ZONE COMPONENT IN CERVICAL SPECIMEN: Status: ACTIVE | Noted: 2020-07-07

## 2020-07-07 PROCEDURE — U0003 INFECTIOUS AGENT DETECTION BY NUCLEIC ACID (DNA OR RNA); SEVERE ACUTE RESPIRATORY SYNDROME CORONAVIRUS 2 (SARS-COV-2) (CORONAVIRUS DISEASE [COVID-19]), AMPLIFIED PROBE TECHNIQUE, MAKING USE OF HIGH THROUGHPUT TECHNOLOGIES AS DESCRIBED BY CMS-2020-01-R: HCPCS

## 2020-07-08 ENCOUNTER — TELEPHONE (OUTPATIENT)
Dept: OBSTETRICS AND GYNECOLOGY | Facility: CLINIC | Age: 55
End: 2020-07-08

## 2020-07-08 LAB — SARS-COV-2 RNA RESP QL NAA+PROBE: NOT DETECTED

## 2020-07-08 NOTE — TELEPHONE ENCOUNTER
Spoke with pt. Pt advised the rx for pain medication will be given after surgery tomorrow. Pt voiced understanding

## 2020-07-08 NOTE — TELEPHONE ENCOUNTER
----- Message from Saniya Angelo sent at 7/8/2020 11:38 AM CDT -----  Contact: BRANDIN STEELE [[id number]]  Type: Patient Call Back    Who called: BRANDIN STEELE [[id number]]    What is the request in detail: Patient is requesting a call back in regards to a prescription. She states that she was advised that she will be given a pain medication and she would like to know if she needs to pick that up today.   Please advise.    Can the clinic reply by [facility]? No    Best call back number: [phone]    Additional Information: N/A

## 2020-07-09 ENCOUNTER — HOSPITAL ENCOUNTER (OUTPATIENT)
Facility: OTHER | Age: 55
Discharge: HOME | End: 2020-07-09
Attending: OBSTETRICS & GYNECOLOGY | Admitting: OBSTETRICS & GYNECOLOGY
Payer: COMMERCIAL

## 2020-07-09 ENCOUNTER — ANESTHESIA (OUTPATIENT)
Dept: SURGERY | Facility: OTHER | Age: 55
End: 2020-07-09
Payer: COMMERCIAL

## 2020-07-09 VITALS
DIASTOLIC BLOOD PRESSURE: 75 MMHG | BODY MASS INDEX: 20.24 KG/M2 | HEIGHT: 62 IN | SYSTOLIC BLOOD PRESSURE: 122 MMHG | OXYGEN SATURATION: 99 % | HEART RATE: 85 BPM | RESPIRATION RATE: 16 BRPM | TEMPERATURE: 99 F | WEIGHT: 110 LBS

## 2020-07-09 DIAGNOSIS — N88.2 CERVICAL STENOSIS (UTERINE CERVIX): ICD-10-CM

## 2020-07-09 DIAGNOSIS — R87.619 ABNORMAL PAP SMEAR OF CERVIX: ICD-10-CM

## 2020-07-09 DIAGNOSIS — Z98.890 H/O CONE BIOPSY OF CERVIX: Primary | ICD-10-CM

## 2020-07-09 DIAGNOSIS — Z87.42 HISTORY OF ABNORMAL CERVICAL PAP SMEAR: ICD-10-CM

## 2020-07-09 LAB
ABO + RH BLD: NORMAL
BLD GP AB SCN CELLS X3 SERPL QL: NORMAL

## 2020-07-09 PROCEDURE — 88342 CHG IMMUNOCYTOCHEMISTRY: ICD-10-PCS | Mod: 26,,, | Performed by: PATHOLOGY

## 2020-07-09 PROCEDURE — 86850 RBC ANTIBODY SCREEN: CPT

## 2020-07-09 PROCEDURE — 71000039 HC RECOVERY, EACH ADD'L HOUR: Performed by: OBSTETRICS & GYNECOLOGY

## 2020-07-09 PROCEDURE — 88342 IMHCHEM/IMCYTCHM 1ST ANTB: CPT | Performed by: PATHOLOGY

## 2020-07-09 PROCEDURE — 71000015 HC POSTOP RECOV 1ST HR: Performed by: OBSTETRICS & GYNECOLOGY

## 2020-07-09 PROCEDURE — 63600175 PHARM REV CODE 636 W HCPCS: Performed by: NURSE ANESTHETIST, CERTIFIED REGISTERED

## 2020-07-09 PROCEDURE — 37000008 HC ANESTHESIA 1ST 15 MINUTES: Performed by: OBSTETRICS & GYNECOLOGY

## 2020-07-09 PROCEDURE — 36000707: Performed by: OBSTETRICS & GYNECOLOGY

## 2020-07-09 PROCEDURE — 63600175 PHARM REV CODE 636 W HCPCS: Performed by: ANESTHESIOLOGY

## 2020-07-09 PROCEDURE — 25000003 PHARM REV CODE 250: Performed by: ANESTHESIOLOGY

## 2020-07-09 PROCEDURE — 25000003 PHARM REV CODE 250: Performed by: OBSTETRICS & GYNECOLOGY

## 2020-07-09 PROCEDURE — 88305 TISSUE EXAM BY PATHOLOGIST: CPT | Performed by: PATHOLOGY

## 2020-07-09 PROCEDURE — 36415 COLL VENOUS BLD VENIPUNCTURE: CPT

## 2020-07-09 PROCEDURE — 57520 CONIZATION OF CERVIX: CPT | Mod: ,,, | Performed by: OBSTETRICS & GYNECOLOGY

## 2020-07-09 PROCEDURE — 88307 TISSUE EXAM BY PATHOLOGIST: CPT | Mod: 26,,, | Performed by: PATHOLOGY

## 2020-07-09 PROCEDURE — 37000009 HC ANESTHESIA EA ADD 15 MINS: Performed by: OBSTETRICS & GYNECOLOGY

## 2020-07-09 PROCEDURE — 57520 PR CONIZATION CERVIX,KNIFE/LASER: ICD-10-PCS | Mod: ,,, | Performed by: OBSTETRICS & GYNECOLOGY

## 2020-07-09 PROCEDURE — 88305 TISSUE EXAM BY PATHOLOGIST: CPT | Mod: 26,,, | Performed by: PATHOLOGY

## 2020-07-09 PROCEDURE — 88305 TISSUE EXAM BY PATHOLOGIST: ICD-10-PCS | Mod: 26,,, | Performed by: PATHOLOGY

## 2020-07-09 PROCEDURE — 88307 PR  SURG PATH,LEVEL V: ICD-10-PCS | Mod: 26,,, | Performed by: PATHOLOGY

## 2020-07-09 PROCEDURE — 71000016 HC POSTOP RECOV ADDL HR: Performed by: OBSTETRICS & GYNECOLOGY

## 2020-07-09 PROCEDURE — 88342 IMHCHEM/IMCYTCHM 1ST ANTB: CPT | Mod: 26,,, | Performed by: PATHOLOGY

## 2020-07-09 PROCEDURE — 71000033 HC RECOVERY, INTIAL HOUR: Performed by: OBSTETRICS & GYNECOLOGY

## 2020-07-09 PROCEDURE — 88307 TISSUE EXAM BY PATHOLOGIST: CPT | Performed by: PATHOLOGY

## 2020-07-09 PROCEDURE — 36000706: Performed by: OBSTETRICS & GYNECOLOGY

## 2020-07-09 RX ORDER — OXYCODONE HYDROCHLORIDE 5 MG/1
5 TABLET ORAL
Status: DISCONTINUED | OUTPATIENT
Start: 2020-07-09 | End: 2020-07-09 | Stop reason: HOSPADM

## 2020-07-09 RX ORDER — ACETAMINOPHEN 500 MG
1000 TABLET ORAL
Status: COMPLETED | OUTPATIENT
Start: 2020-07-09 | End: 2020-07-09

## 2020-07-09 RX ORDER — DIPHENHYDRAMINE HYDROCHLORIDE 50 MG/ML
25 INJECTION INTRAMUSCULAR; INTRAVENOUS EVERY 4 HOURS PRN
Status: CANCELLED | OUTPATIENT
Start: 2020-07-09

## 2020-07-09 RX ORDER — DIPHENHYDRAMINE HCL 25 MG
25 CAPSULE ORAL EVERY 4 HOURS PRN
Status: CANCELLED | OUTPATIENT
Start: 2020-07-09

## 2020-07-09 RX ORDER — HYDROMORPHONE HYDROCHLORIDE 2 MG/ML
0.4 INJECTION, SOLUTION INTRAMUSCULAR; INTRAVENOUS; SUBCUTANEOUS EVERY 5 MIN PRN
Status: DISCONTINUED | OUTPATIENT
Start: 2020-07-09 | End: 2020-07-09 | Stop reason: HOSPADM

## 2020-07-09 RX ORDER — ONDANSETRON 2 MG/ML
4 INJECTION INTRAMUSCULAR; INTRAVENOUS DAILY PRN
Status: DISCONTINUED | OUTPATIENT
Start: 2020-07-09 | End: 2020-07-09 | Stop reason: HOSPADM

## 2020-07-09 RX ORDER — PREGABALIN 50 MG/1
50 CAPSULE ORAL
Status: COMPLETED | OUTPATIENT
Start: 2020-07-09 | End: 2020-07-09

## 2020-07-09 RX ORDER — ONDANSETRON 8 MG/1
8 TABLET, ORALLY DISINTEGRATING ORAL EVERY 8 HOURS PRN
Status: DISCONTINUED | OUTPATIENT
Start: 2020-07-09 | End: 2020-07-09

## 2020-07-09 RX ORDER — HYDROCODONE BITARTRATE AND ACETAMINOPHEN 5; 325 MG/1; MG/1
1 TABLET ORAL EVERY 4 HOURS PRN
Qty: 10 TABLET | Refills: 0 | Status: SHIPPED | OUTPATIENT
Start: 2020-07-09 | End: 2024-03-05

## 2020-07-09 RX ORDER — MIDAZOLAM HYDROCHLORIDE 1 MG/ML
2 INJECTION INTRAMUSCULAR; INTRAVENOUS
Status: COMPLETED | OUTPATIENT
Start: 2020-07-09 | End: 2020-07-09

## 2020-07-09 RX ORDER — SCOLOPAMINE TRANSDERMAL SYSTEM 1 MG/1
1 PATCH, EXTENDED RELEASE TRANSDERMAL ONCE
Status: COMPLETED | OUTPATIENT
Start: 2020-07-09 | End: 2020-07-09

## 2020-07-09 RX ORDER — HYDROCODONE BITARTRATE AND ACETAMINOPHEN 5; 325 MG/1; MG/1
1 TABLET ORAL EVERY 4 HOURS PRN
Status: CANCELLED | OUTPATIENT
Start: 2020-07-09

## 2020-07-09 RX ORDER — LIDOCAINE HYDROCHLORIDE 10 MG/ML
0.5 INJECTION, SOLUTION EPIDURAL; INFILTRATION; INTRACAUDAL; PERINEURAL ONCE
Status: DISCONTINUED | OUTPATIENT
Start: 2020-07-09 | End: 2020-07-09 | Stop reason: HOSPADM

## 2020-07-09 RX ORDER — MEPERIDINE HYDROCHLORIDE 25 MG/ML
12.5 INJECTION INTRAMUSCULAR; INTRAVENOUS; SUBCUTANEOUS ONCE AS NEEDED
Status: DISCONTINUED | OUTPATIENT
Start: 2020-07-09 | End: 2020-07-09 | Stop reason: HOSPADM

## 2020-07-09 RX ORDER — DEXTROSE MONOHYDRATE, SODIUM CHLORIDE, AND POTASSIUM CHLORIDE 50; 1.49; 4.5 G/1000ML; G/1000ML; G/1000ML
INJECTION, SOLUTION INTRAVENOUS CONTINUOUS
Status: DISCONTINUED | OUTPATIENT
Start: 2020-07-09 | End: 2024-03-27

## 2020-07-09 RX ORDER — ONDANSETRON 2 MG/ML
INJECTION INTRAMUSCULAR; INTRAVENOUS
Status: DISCONTINUED | OUTPATIENT
Start: 2020-07-09 | End: 2020-07-09

## 2020-07-09 RX ORDER — PROPOFOL 10 MG/ML
VIAL (ML) INTRAVENOUS
Status: DISCONTINUED | OUTPATIENT
Start: 2020-07-09 | End: 2020-07-09

## 2020-07-09 RX ORDER — FENTANYL CITRATE 50 UG/ML
INJECTION, SOLUTION INTRAMUSCULAR; INTRAVENOUS
Status: DISCONTINUED | OUTPATIENT
Start: 2020-07-09 | End: 2020-07-09

## 2020-07-09 RX ORDER — MIDAZOLAM HYDROCHLORIDE 1 MG/ML
INJECTION INTRAMUSCULAR; INTRAVENOUS
Status: DISCONTINUED | OUTPATIENT
Start: 2020-07-09 | End: 2020-07-09

## 2020-07-09 RX ORDER — SODIUM CHLORIDE, SODIUM LACTATE, POTASSIUM CHLORIDE, CALCIUM CHLORIDE 600; 310; 30; 20 MG/100ML; MG/100ML; MG/100ML; MG/100ML
INJECTION, SOLUTION INTRAVENOUS CONTINUOUS
Status: DISCONTINUED | OUTPATIENT
Start: 2020-07-09 | End: 2020-07-09 | Stop reason: HOSPADM

## 2020-07-09 RX ORDER — DIPHENHYDRAMINE HYDROCHLORIDE 50 MG/ML
INJECTION INTRAMUSCULAR; INTRAVENOUS
Status: DISCONTINUED | OUTPATIENT
Start: 2020-07-09 | End: 2020-07-09

## 2020-07-09 RX ORDER — CELECOXIB 200 MG/1
400 CAPSULE ORAL
Status: COMPLETED | OUTPATIENT
Start: 2020-07-09 | End: 2020-07-09

## 2020-07-09 RX ORDER — LIDOCAINE HYDROCHLORIDE 20 MG/ML
INJECTION INTRAVENOUS
Status: DISCONTINUED | OUTPATIENT
Start: 2020-07-09 | End: 2020-07-09

## 2020-07-09 RX ORDER — SODIUM CHLORIDE 0.9 % (FLUSH) 0.9 %
3 SYRINGE (ML) INJECTION
Status: DISCONTINUED | OUTPATIENT
Start: 2020-07-09 | End: 2020-07-09 | Stop reason: HOSPADM

## 2020-07-09 RX ORDER — PROPOFOL 10 MG/ML
VIAL (ML) INTRAVENOUS CONTINUOUS PRN
Status: DISCONTINUED | OUTPATIENT
Start: 2020-07-09 | End: 2020-07-09

## 2020-07-09 RX ORDER — IBUPROFEN 800 MG/1
800 TABLET ORAL EVERY 8 HOURS PRN
Qty: 30 TABLET | Refills: 0 | Status: SHIPPED | OUTPATIENT
Start: 2020-07-09

## 2020-07-09 RX ORDER — DEXAMETHASONE SODIUM PHOSPHATE 4 MG/ML
INJECTION, SOLUTION INTRA-ARTICULAR; INTRALESIONAL; INTRAMUSCULAR; INTRAVENOUS; SOFT TISSUE
Status: DISCONTINUED | OUTPATIENT
Start: 2020-07-09 | End: 2020-07-09

## 2020-07-09 RX ADMIN — MIDAZOLAM HYDROCHLORIDE 2 MG: 1 INJECTION, SOLUTION INTRAMUSCULAR; INTRAVENOUS at 06:07

## 2020-07-09 RX ADMIN — FENTANYL CITRATE 50 MCG: 50 INJECTION, SOLUTION INTRAMUSCULAR; INTRAVENOUS at 08:07

## 2020-07-09 RX ADMIN — LIDOCAINE HYDROCHLORIDE 50 MG: 20 INJECTION, SOLUTION INTRAVENOUS at 08:07

## 2020-07-09 RX ADMIN — HYDROMORPHONE HYDROCHLORIDE 0.4 MG: 2 INJECTION, SOLUTION INTRAMUSCULAR; INTRAVENOUS; SUBCUTANEOUS at 09:07

## 2020-07-09 RX ADMIN — PROPOFOL 50 MG: 10 INJECTION, EMULSION INTRAVENOUS at 08:07

## 2020-07-09 RX ADMIN — SODIUM CHLORIDE, SODIUM LACTATE, POTASSIUM CHLORIDE, AND CALCIUM CHLORIDE: 600; 310; 30; 20 INJECTION, SOLUTION INTRAVENOUS at 07:07

## 2020-07-09 RX ADMIN — ONDANSETRON 4 MG: 2 INJECTION INTRAMUSCULAR; INTRAVENOUS at 08:07

## 2020-07-09 RX ADMIN — OXYCODONE 5 MG: 5 TABLET ORAL at 09:07

## 2020-07-09 RX ADMIN — PROPOFOL 200 MG: 10 INJECTION, EMULSION INTRAVENOUS at 08:07

## 2020-07-09 RX ADMIN — MIDAZOLAM HYDROCHLORIDE 2 MG: 1 INJECTION, SOLUTION INTRAMUSCULAR; INTRAVENOUS at 08:07

## 2020-07-09 RX ADMIN — ACETAMINOPHEN 1000 MG: 500 TABLET, FILM COATED ORAL at 06:07

## 2020-07-09 RX ADMIN — SCOPALAMINE 1 PATCH: 1 PATCH, EXTENDED RELEASE TRANSDERMAL at 06:07

## 2020-07-09 RX ADMIN — DIPHENHYDRAMINE HYDROCHLORIDE 6.25 MG: 50 INJECTION, SOLUTION INTRAMUSCULAR; INTRAVENOUS at 08:07

## 2020-07-09 RX ADMIN — PREGABALIN 50 MG: 50 CAPSULE ORAL at 06:07

## 2020-07-09 RX ADMIN — PROPOFOL 150 MCG/KG/MIN: 10 INJECTION, EMULSION INTRAVENOUS at 08:07

## 2020-07-09 RX ADMIN — DEXAMETHASONE SODIUM PHOSPHATE 8 MG: 4 INJECTION, SOLUTION INTRAMUSCULAR; INTRAVENOUS at 08:07

## 2020-07-09 RX ADMIN — CELECOXIB 400 MG: 200 CAPSULE ORAL at 06:07

## 2020-07-09 NOTE — PLAN OF CARE
Johana Wright has met all discharge criteria from Phase II. Vital Signs are stable, ambulating  without difficulty.Pain is now under control and tolerable for the pt. Pain score 2 at this time.  Discharge instructions given, patient verbalized understanding. Discharged from facility via wheelchair in stable condition.

## 2020-07-09 NOTE — DISCHARGE SUMMARY
[facility]  Brief Operative Note     SUMMARY     Surgery Date: 7/9/2020     Surgeon(s) and Role:     * Talisha Joshua MD - Primary    Assisting Surgeon: None    Pre-op Diagnosis:  Pap smear abnormality of cervix with ASCUS favoring benign [R87.610]  Cervical stenosis (uterine cervix) [N88.2]  Unsatisfactory colposcopy    Post-op Diagnosis:  Post-Op Diagnosis Codes:     * Pap smear abnormality of cervix with ASCUS favoring benign [R87.610]     * Cervical stenosis (uterine cervix) [N88.2]  Unsatisfactory colposcopy    Procedure(s) (LRB):  CONE BIOPSY, CERVIX, USING COLD KNIFE (N/A)    Anesthesia: General    Findings/Key Components:   1. Cervix did not pull.   2. Cone biopsy and post cone ECC sent to pathology.    Estimated Blood Loss: minimal    IVF: See anesthesia report    UOP: 75 cc    Complications: none         Specimens:   Specimen (12h ago, onward)    None          Discharge Note    SUMMARY     Admit Date: 7/9/2020    Discharge Date:  07/09/2020 9:05 AM    Hospital Course Patient was admitted for the above mentioned procedure.  Procedure was performed without difficulty.  Please see operative report for further details.  She was transferred to recovery in stable condition and discharged home the same day.      Final Diagnosis: Post-Op Diagnosis Codes:     * Pap smear abnormality of cervix with ASCUS favoring benign [R87.610]     * Cervical stenosis (uterine cervix) [N88.2]    Disposition: Home or Self Care    Follow Up/Patient Instructions: see below    Medications:  Reconciled Home Medications:      Medication List      START taking these medications    HYDROcodone-acetaminophen 5-325 mg per tablet  Commonly known as: NORCO  Take 1 tablet by mouth every 4 (four) hours as needed for Pain.     ibuprofen 800 MG tablet  Commonly known as: ADVIL,MOTRIN  Take 1 tablet (800 mg total) by mouth every 8 (eight) hours as needed for Pain.        CONTINUE taking these medications    paroxetine 20  MG tablet  Commonly known as: PAXIL     tretinoin 0.05 % cream  Commonly known as: RETIN-A          Discharge Procedure Orders   Diet general     Call MD for:   Order Comments: Heavy vaginal bleeding saturating more than 1 pad per hr for at least consecutive 2 hrs.     Call MD for:  temperature >100.4     Call MD for:  persistent nausea and vomiting     Call MD for:  severe uncontrolled pain     Follow-up Information     Talisha Joshua MD In 2 weeks.    Specialties: Obstetrics, Obstetrics and Gynecology  Contact information:  [address] [place] [address] [id number] [phone]                 Amberly Crump MD/MPH  OB/GYN PGY1

## 2020-07-09 NOTE — DISCHARGE INSTRUCTIONS
Anesthesia: After Your Surgery  Youve just had surgery. During surgery, you received medication called anesthesia to keep you comfortable and pain-free. After surgery, you may experience some pain or nausea. This is common. Here are some tips for feeling better and recovering after surgery.    Going home  Your doctor or nurse will show you how to take care of yourself when you go home. He or she will also answer your questions. Have an adult family member or friend drive you home. For the first 24 hours after your surgery:  · Do not drive or use heavy equipment.  · Do not make important decisions or sign legal documents.  · Avoid alcohol.  · Have someone stay with you, if needed. He or she can watch for problems and help keep you safe.  · Take your time getting up from a seated or lying position. You may experience dizziness for 24 hours  Be sure to keep all follow-up appointments with your doctor. And rest after your procedure for as long as your doctor tells you to.    Coping with pain  If you have pain after surgery, pain medication will help you feel better. Take it as directed, before pain becomes severe. Also, ask your doctor or pharmacist about other ways to control pain, such as with heat, ice, and relaxation. And follow any other instructions your surgeon or nurse gives you.    URINARY RETENTION  Should you experience a decrease in your urine output or are unable to urinate following surgery, this can be due to the medications given during surgery.  We recommend you going to the nearest Emergency Department.    Tips for taking pain medication  To get the best relief possible, remember these points:  · Pain medications can upset your stomach. Taking them with a little food may help.  · Most pain relievers taken by mouth need at least 20 to 30 minutes to take effect.  · Taking medication on a schedule can help you remember to take it. Try to time your medication so that you can take it before beginning an  activity, such as dressing, walking, or sitting down for dinner.  · Constipation is a common side effect of pain medications. Contact your doctor before taking any medications like laxatives or stool softeners to help relieve constipation. Also ask about any dietary restrictions, because drinking lots of fluids and eating foods like fruits and vegetables that are high in fiber can also help. Remember, dont take laxatives unless your surgeon has prescribed them.  · Mixing alcohol and pain medication can cause dizziness and slow your breathing. It can even be fatal. Dont drink alcohol while taking pain medication.  · Pain medication can slow your reflexes. Dont drive or operate machinery while taking pain medication.  If your health care provider tells you to take acetaminophen to help relieve your pain, ask him or her how much you are supposed to take each day. (Acetaminophen is the generic name for Tylenol and other brand-name pain relievers.) Acetaminophen or other pain relievers may interact with your prescription medicines or other over-the-counter (OTC) drugs. Some prescription medications contain acetaminophen along with other active ingredients. Using both prescription and OTC acetaminophen for pain can cause you to overdose. The FDA recommends that you read the labels on your OTC medications carefully. This will help you to clearly understand the list of active ingredients, dosing instructions, and any warnings. It may also help you avoid taking too much acetaminophen. If you have questions or don't understand the information, ask your pharmacist or health care provider to explain it to you before you take the OTC medication.    Managing nausea  Some people have an upset stomach after surgery. This is often due to anesthesia, pain, pain medications, or the stress of surgery. The following tips will help you manage nausea and get good nutrition as you recover. If you were on a special diet before surgery,  ask your doctor if you should follow it during recovery. These tips may help:  · Dont push yourself to eat. Your body will tell you when to eat and how much.  · Start off with clear liquids and soup. They are easier to digest.  · Progress to semi-solid foods (mashed potatoes, applesauce, and gelatin) as you feel ready.  · Slowly move to solid foods. Dont eat fatty, rich, or spicy foods at first.  · Dont force yourself to have three large meals a day. Instead, eat smaller amounts more often.  · Take pain medications with a small amount of solid food, such as crackers or toast to avoid nausea.      Call your surgeon if    · You feel too sleepy, dizzy, or groggy (medication may be too strong).  · You have side effects like nausea, vomiting, or skin changes (rash, itching, or hives).   © 9993-3588 [employer]. [address] [place] [address]. All rights reserved. This information is not intended as a substitute for professional medical care. Always follow your healthcare professional's instructions.                  After a Cone Biopsy     Make sure to keep any follow-up appointments with your healthcare provider.     A cone biopsy is a quick outpatient surgery used to find and treat a problem in the cervix. Your healthcare provider may do a cone biopsy if one or more Pap tests and a microscope (colposcopy) exam showed abnormal cells on your cervix. A cone biopsy takes less than an hour, and youll be able to go home the same day.  During your recovery  After the surgery has been done, youll rest in the recovery area until youre awake and ready to go home. An adult friend or family member will need to drive you home.  · Plan to rest at home for a day or two.  · You may have some bleeding or discharge and mild cramping for a few days after surgery. Use sanitary pads, not tampons, for at least the first month.  · You may be given medicine to relieve any discomfort  · Do not have sexual  intercourse or douche for 4 to 6 weeks after your biopsy. If the cervix has not fully healed, the tissue could be injured and then bleed.  · Follow any other instructions your healthcare provider gives you.  Getting your results  Your healthcare provider will get the biopsy results and discuss them with you in about a week. He or she will see you in 3 to 6 weeks to be sure the tissue is healing well.  Call your healthcare provider if you have any of the following after your cone biopsy:  · Heavy bleeding (more than a pad an hour) or blood clots  · Severe stomach pain  · Chills  · Fever over 100.4°F (38°C)   Date Last Reviewed: 4/26/2015  © 5232-9646 [employer]. [address] [place] [address], [place] [address]. All rights reserved. This information is not intended as a substitute for professional medical care. Always follow your healthcare professional's instructions.      FOLLOW ANY OTHER INSTRUCTIONS GIVEN TO YOU BY DR BYRNES

## 2020-07-09 NOTE — ANESTHESIA POSTPROCEDURE EVALUATION
Anesthesia Post Evaluation    Patient: Johana Wright    Procedure(s) Performed: Procedure(s) (LRB):  CONE BIOPSY, CERVIX, USING COLD KNIFE (N/A)    Final Anesthesia Type: general    Patient location during evaluation: PACU  Patient participation: Yes- Able to Participate  Level of consciousness: awake and alert  Post-procedure vital signs: reviewed and stable  Pain management: adequate  Airway patency: patent    PONV status at discharge: No PONV  Anesthetic complications: no      Cardiovascular status: blood pressure returned to baseline  Respiratory status: unassisted, spontaneous ventilation and room air  Hydration status: euvolemic  Follow-up not needed.          Vitals Value Taken Time   /75 07/09/20 1058   Temp 37 °C (98.6 °F) 07/09/20 0958   Pulse 85 07/09/20 1058   Resp 16 07/09/20 1058   SpO2 99 % 07/09/20 1058         Event Time   Out of Recovery 09:53:36         Pain/Guera Score: Pain Rating Prior to Med Admin: 7 (7/9/2020  9:25 AM)  Pain Rating Post Med Admin: 5 (7/9/2020  9:37 AM)  Guera Score: 10 (7/9/2020 10:58 AM)

## 2020-07-09 NOTE — H&P
"SUBJECTIVE:   55 y.o. female   For recurrent abnormal PAPs. No LMP recorded. Patient is postmenopausal..  Had normal PAP 2407-4577.  Then started with abnormal PAPs as listed below.   ASCUS PAP + HR HPV   "Normal" but unsatisfactory colposcopy with TZ zone not seen - limited due to cervical stenosis, prior cryotherapy, and prior em ablation.  ECC unable to be done.     ASCUS PAP +HR HPV  3-2020 "Normal" but unsatisfactory colposcopy with TZ zone not seen - limited due to cervical stenosis, prior cryotherapy, and prior em ablation.  ECC unable to be done.    Discussed recurrent abnormal PAP, cervical stenosis, unsatisfactory colposcopy and mgt options.  May continue to monitor with PAPs every 6 months.  Discussed risk of cervical dysplasia and difficulty with evaluation due to anatomy.  Offered office LEEP vs surgery in the OR.  Desires surgery in OR.  Plan for CKC in OR.  She has no unusual complaints                 Past Medical History:   Diagnosis Date    Abnormal Pap smear of cervix age 29     cryo    Breast disorder             Past Surgical History:   Procedure Laterality Date    BREAST SURGERY        HAND SURGERY        LASER ABLATION OF THE CERVIX   age 29     cryo ?    RECTAL PROLAPSE REPAIR   2019    SINUS SURGERY        TONSILLECTOMY, ADENOIDECTOMY        TUBAL LIGATION         Social History               Socioeconomic History    Marital status:        Spouse name: Not on file    Number of children: Not on file    Years of education: Not on file    Highest education level: Not on file   Occupational History    Not on file   Social Needs    Financial resource strain: Not on file    Food insecurity       Worry: Not on file       Inability: Not on file    Transportation needs       Medical: Not on file       Non-medical: Not on file   Tobacco Use    Smoking status: Never Smoker    Smokeless tobacco: Never Used   Substance and Sexual Activity    Alcohol " use: Yes       Comment: social    Drug use: No    Sexual activity: Yes       Partners: Male       Birth control/protection: Surgical   Lifestyle    Physical activity       Days per week: Not on file       Minutes per session: Not on file    Stress: Not on file   Relationships    Social connections       Talks on phone: Not on file       Gets together: Not on file       Attends Bahai service: Not on file       Active member of club or organization: Not on file       Attends meetings of clubs or organizations: Not on file       Relationship status: Not on file   Other Topics Concern    Not on file   Social History Narrative    Not on file              Family History   Problem Relation Age of Onset    Colon cancer Father 89    Breast cancer Neg Hx      Ovarian cancer Neg Hx                        OB History    Para Term  AB Living   2 0 0   2 0   SAB TAB Ectopic Multiple Live Births      0 2     0          # Outcome Date GA Lbr Shaheed/2nd Weight Sex Delivery Anes PTL Lv   2 TAB                     1 TAB                             Current Medications   Current Outpatient Medications   Medication Sig Dispense Refill    paroxetine (PAXIL) 20 MG tablet          tretinoin (RETIN-A) 0.05 % cream     12      No current facility-administered medications for this visit.         Allergies: Adhesive      ROS:  Constitutional: no weight loss, weight gain, fever, fatigue  Eyes:  No vision changes, glasses/contacts  ENT/Mouth: No ulcers, sinus problems, ears ringing, headache  Cardiovascular: No inability to lie flat, chest pain, exercise intolerance, swelling, heart palpitations  Respiratory: No wheezing, coughing blood, shortness of breath, or cough  Gastrointestinal: No diarrhea, bloody stool, nausea/vomiting, constipation, gas, hemorrhoids  Genitourinary: No blood in urine, painful urination, urgency of urination, frequency of urination, incomplete emptying, incontinence, abnormal bleeding, painful  "periods, heavy periods, vaginal discharge, vaginal odor, painful intercourse, sexual problems, bleeding after intercourse.  Musculoskeletal: No muscle weakness  Skin/Breast: No painful breasts, nipple discharge, masses, rash, ulcers  Neurological: No passing out, seizures, numbness, headache  Endocrine: No diabetes, hypothyroid, hyperthyroid, hot flashes, hair loss, abnormal hair growth, acne  Psychiatric: No depression, crying  Hematologic: No bruises, bleeding, swollen lymph nodes, anemia.        OBJECTIVE:   The patient appears well, alert, oriented x 3, in no distress.  /80 (BP Location: Right arm, Patient Position: Sitting, BP Method: Medium (Manual))   Ht 5' 2" (1.575 m)   Wt 50.9 kg (112 lb 3.4 oz)   BMI 20.52 kg/m²   NECK: no thyromegaly, trachea midline  SKIN: no acne, striae, hirsutism  CHEST: CTAB  CV: RRR  BREAST EXAM: Deferred  ABDOMEN: no hernias, masses, or hepatosplenomegaly  GENITALIA: normal external genitalia, no erythema, no discharge  URETHRA: normal urethra, normal urethral meatus  VAGINA: Normal  CERVIX: no lesions or cervical motion tenderness  UTERUS: normal size, contour, position, consistency, mobility, non-tender  ADNEXA: no mass, fullness, tenderness        ASSESSMENT:   1. Preoperative exam for gynecologic surgery  Vaginosis Screen by DNA Probe   2. History of abnormal cervical Pap smear      3. Cervical stenosis (uterine cervix)           PLAN:       Orders Placed This Encounter    Vaginosis Screen by DNA Probe     Discussed recurrent abnormal PAPs, inability for complete evaluation with colposcopy exam due to TZ not seen (prior cryo) and stenosis, and mgt options- continued observation vs LEEP or CKC.  Desires CKC in OR.  Consents signed.  Return to clinic for post op    "

## 2020-07-09 NOTE — TRANSFER OF CARE
"Anesthesia Transfer of Care Note    Patient: Johana Wright    Procedure(s) Performed: Procedure(s) (LRB):  CONE BIOPSY, CERVIX, USING COLD KNIFE (N/A)    Patient location: PACU    Anesthesia Type: general    Transport from OR: Transported from OR on 2-3 L/min O2 by NC with adequate spontaneous ventilation    Post pain: adequate analgesia    Post assessment: no apparent anesthetic complications    Post vital signs: stable    Level of consciousness: awake    Nausea/Vomiting: no nausea/vomiting    Complications: none    Transfer of care protocol was followed      Last vitals:   Visit Vitals  /76 (BP Location: Right arm, Patient Position: Lying)   Pulse 90   Temp 36.4 °C (97.5 °F) (Axillary)   Resp 16   Ht 5' 2" (1.575 m)   Wt 49.9 kg (110 lb)   SpO2 100%   Breastfeeding No   BMI 20.12 kg/m²     "

## 2020-07-09 NOTE — INTERVAL H&P NOTE
The patient has been examined and the H&P has been reviewed:    I concur with the findings and no changes have occurred since H&P was written.    Anesthesia/Surgery risks, benefits and alternative options discussed and understood by patient/family. All questions answered and concerns addressed. To OR for planned procedure.    Amberly Crump MD/MPH  OB/GYN PGY1          Active Hospital Problems    Diagnosis  POA    *History of abnormal cervical Pap smear [Z87.42]  Not Applicable    Abnormal Pap smear of cervix [R87.619]  Yes    Insufficient endocervical or transformation zone component in cervical specimen [R87.615]  Yes    Cervical stenosis (uterine cervix) [N88.2]  Yes      Resolved Hospital Problems   No resolved problems to display.

## 2020-07-09 NOTE — OP NOTE
Operative Report    Procedure: CKC, Post cone ECC    Date of Surgery 07/09/2020    Surgeon: Talisha Joshua MD    Assistant: Amberly Crump MD (PGY1)    Pre-Op Diagnosis:   1. Persistent abnormal Pap  2. Cervical stenosis  3. Unsastisfactory colposcopy    Post-op Diagnosis:  Same    Complications: none    EBL: minimal     IVF: see anesthesia report    Urine Output: 75 cc via in/out cath prior to start of procedure     Specimen: Ectocervical conization, post cone ECC    Findings: Findings/Key Components: Cervix did not pull. Cone biopsy and ECC sent to pathology.    DESCRIPTION OF PROCEDURE:   The patient was taken to the operating room, where LMA anesthesia was obtained without difficulty. The patient was prepped and draped in the normal sterile fashion in the dorsal lithotomy position using candy cane stirrups. Attention was then turned to the patient's vagina where the weighted speculum was placed into the posterior fornix and a right angle was placed into the anterior fornix. Tenaculum was placed on the anterior lip of the cervix.  Two sutures of 0 Vicryl were used to ligate the cervical branches of the cervical artery at the 3 and 9 o'clock positions. Cold-knife cone specimen was then obtained, using scalpel and mayos. This was handed off for pathologic review. Stitch was placed at the 12 o'clock position on the cervix for orientation. Next, endocervical curetting was performed of the canal. This was handed off also for pathologic review. The base of the cervical cone specimen was then cauterized using Bovie cautery. The margins of the cone specimen were also cauterized in a similar manner. The cone site was noted to be hemostatic. Monsel's solution was applied.  Gel foal was placed in the cervical bed and sutures were tied to secure. The sutures were cut and all the instruments removed from the patient's vagina. All sponge, lap and needle counts were correct x2. The patient tolerated the procedure well, was  awakened and transferred to the recovery room.    Amberly Crump MD/MPH  OB/GYN PGY1

## 2020-07-14 ENCOUNTER — TELEPHONE (OUTPATIENT)
Dept: OBSTETRICS AND GYNECOLOGY | Facility: CLINIC | Age: 55
End: 2020-07-14

## 2020-07-14 LAB
FINAL PATHOLOGIC DIAGNOSIS: NORMAL
GROSS: NORMAL

## 2020-07-14 NOTE — TELEPHONE ENCOUNTER
----- Message from Cherelle Cox sent at 7/14/2020  2:39 PM CDT -----  Contact: BRANDIN STEELE [[id number]]  Name of Who is Calling: BRANDIN STEELE [[id number]]      What is the request in detail: pt is returning Jeanette call      Can the clinic reply by [facility]: no      [facility]  [phone] (work)

## 2020-07-27 ENCOUNTER — TELEPHONE (OUTPATIENT)
Dept: OBSTETRICS AND GYNECOLOGY | Facility: CLINIC | Age: 55
End: 2020-07-27

## 2020-07-27 NOTE — TELEPHONE ENCOUNTER
Post-op call to check for signs or symptoms of COVID-19 since surgery on 7/9.  The patient denied having any signs or symptoms of COVID-19.

## 2020-07-30 ENCOUNTER — OFFICE VISIT (OUTPATIENT)
Dept: OBSTETRICS AND GYNECOLOGY | Facility: CLINIC | Age: 55
End: 2020-07-30
Attending: OBSTETRICS & GYNECOLOGY
Payer: COMMERCIAL

## 2020-07-30 VITALS
DIASTOLIC BLOOD PRESSURE: 70 MMHG | HEIGHT: 62 IN | BODY MASS INDEX: 20.08 KG/M2 | SYSTOLIC BLOOD PRESSURE: 110 MMHG | WEIGHT: 109.13 LBS

## 2020-07-30 DIAGNOSIS — N87.0 MILD DYSPLASIA OF CERVIX: Primary | ICD-10-CM

## 2020-07-30 DIAGNOSIS — Z12.39 BREAST CANCER SCREENING: ICD-10-CM

## 2020-07-30 PROBLEM — R87.619 ABNORMAL PAP SMEAR OF CERVIX: Status: RESOLVED | Noted: 2020-07-09 | Resolved: 2020-07-30

## 2020-07-30 PROBLEM — N88.2 CERVICAL STENOSIS (UTERINE CERVIX): Status: RESOLVED | Noted: 2020-01-30 | Resolved: 2020-07-30

## 2020-07-30 PROBLEM — R87.615 INSUFFICIENT ENDOCERVICAL OR TRANSFORMATION ZONE COMPONENT IN CERVICAL SPECIMEN: Status: RESOLVED | Noted: 2020-07-07 | Resolved: 2020-07-30

## 2020-07-30 PROBLEM — Z98.890 H/O CONE BIOPSY OF CERVIX: Status: RESOLVED | Noted: 2020-07-09 | Resolved: 2020-07-30

## 2020-07-30 PROCEDURE — 99999 PR PBB SHADOW E&M-EST. PATIENT-LVL III: ICD-10-PCS | Mod: PBBFAC,,, | Performed by: OBSTETRICS & GYNECOLOGY

## 2020-07-30 PROCEDURE — 99024 POSTOP FOLLOW-UP VISIT: CPT | Mod: S$GLB,,, | Performed by: OBSTETRICS & GYNECOLOGY

## 2020-07-30 PROCEDURE — 99999 PR PBB SHADOW E&M-EST. PATIENT-LVL III: CPT | Mod: PBBFAC,,, | Performed by: OBSTETRICS & GYNECOLOGY

## 2020-07-30 PROCEDURE — 99024 PR POST-OP FOLLOW-UP VISIT: ICD-10-PCS | Mod: S$GLB,,, | Performed by: OBSTETRICS & GYNECOLOGY

## 2020-07-30 NOTE — PROGRESS NOTES
SUBJECTIVE:   55 y.o. female   for follow up of abnormal PAP. No LMP recorded. Patient is postmenopausal..  Had CKC 2020 due to recurrent abnormal PAP and inability to view TZ zone/ cervical stenosis.  Path showed JOCELYN 1.  Has had some minimal bleeding, decreasing since surgery.         Past Medical History:   Diagnosis Date    Abnormal Pap smear of cervix age 29    cryo    Breast disorder      Past Surgical History:   Procedure Laterality Date    BREAST SURGERY      CERVICAL BIOPSY  W/ LOOP ELECTRODE EXCISION      2020    COLD KNIFE CONIZATION OF CERVIX N/A 2020    Procedure: CONE BIOPSY, CERVIX, USING COLD KNIFE;  Surgeon: Talisha Joshua MD;  Location: [facility];  Service: OB/GYN;  Laterality: N/A;    HAND SURGERY      LASER ABLATION OF THE CERVIX  age 29    cryo ?    RECTAL PROLAPSE REPAIR  2019    SINUS SURGERY      TONSILLECTOMY, ADENOIDECTOMY      TUBAL LIGATION       Social History     Socioeconomic History    Marital status:      Spouse name: Not on file    Number of children: Not on file    Years of education: Not on file    Highest education level: Not on file   Occupational History    Not on file   Social Needs    Financial resource strain: Not on file    Food insecurity     Worry: Not on file     Inability: Not on file    Transportation needs     Medical: Not on file     Non-medical: Not on file   Tobacco Use    Smoking status: Never Smoker    Smokeless tobacco: Never Used   Substance and Sexual Activity    Alcohol use: Yes     Comment: social    Drug use: No    Sexual activity: Not Currently     Partners: Male     Birth control/protection: Surgical   Lifestyle    Physical activity     Days per week: Not on file     Minutes per session: Not on file    Stress: Not on file   Relationships    Social connections     Talks on phone: Not on file     Gets together: Not on file     Attends Advent service: Not on file     Active member of club or  organization: Not on file     Attends meetings of clubs or organizations: Not on file     Relationship status: Not on file   Other Topics Concern    Not on file   Social History Narrative    Not on file     Family History   Problem Relation Age of Onset    Colon cancer Father 89    Breast cancer Neg Hx     Ovarian cancer Neg Hx      OB History    Para Term  AB Living   2 0 0   2 0   SAB TAB Ectopic Multiple Live Births   0 2     0      # Outcome Date GA Lbr Shaheed/2nd Weight Sex Delivery Anes PTL Lv   2 TAB            1 TAB                  Current Outpatient Medications   Medication Sig Dispense Refill    paroxetine (PAXIL) 20 MG tablet       tretinoin (RETIN-A) 0.05 % cream   12    HYDROcodone-acetaminophen (NORCO) 5-325 mg per tablet Take 1 tablet by mouth every 4 (four) hours as needed for Pain. 10 tablet 0    ibuprofen (ADVIL,MOTRIN) 800 MG tablet Take 1 tablet (800 mg total) by mouth every 8 (eight) hours as needed for Pain. 30 tablet 0     No current facility-administered medications for this visit.      Facility-Administered Medications Ordered in Other Visits   Medication Dose Route Frequency Provider Last Rate Last Dose    dextrose 5 % and 0.45 % NaCl with KCl 20 mEq infusion   Intravenous Continuous Talisha Joshua MD         Allergies: Adhesive     ROS:  Constitutional: no weight loss, weight gain, fever, fatigue  Eyes:  No vision changes, glasses/contacts  ENT/Mouth: No ulcers, sinus problems, ears ringing, headache  Cardiovascular: No inability to lie flat, chest pain, exercise intolerance, swelling, heart palpitations  Respiratory: No wheezing, coughing blood, shortness of breath, or cough  Gastrointestinal: No diarrhea, bloody stool, nausea/vomiting, constipation, gas, hemorrhoids  Genitourinary: No blood in urine, painful urination, urgency of urination, frequency of urination, incomplete emptying, incontinence, abnormal bleeding, painful periods, heavy periods, vaginal  "discharge, vaginal odor, painful intercourse, sexual problems, bleeding after intercourse.  Musculoskeletal: No muscle weakness  Skin/Breast: No painful breasts, nipple discharge, masses, rash, ulcers  Neurological: No passing out, seizures, numbness, headache  Endocrine: No diabetes, hypothyroid, hyperthyroid, hot flashes, hair loss, abnormal hair growth, ance  Psychiatric: No depression, crying  Hematologic: No bruises, bleeding, swollen lymph nodes, anemia.      OBJECTIVE:   The patient appears well, alert, oriented x 3, in no distress.  /70 (BP Location: Right arm, Patient Position: Sitting, BP Method: Medium (Manual))   Ht 5' 2" (1.575 m)   Wt 49.5 kg (109 lb 2 oz)   BMI 19.96 kg/m²   ABDOMEN: no hernias, masses, or hepatosplenomegaly  GENITALIA: normal external genitalia, no erythema, no discharge  URETHRA: normal urethra, normal urethral meatus  VAGINA: Normal  CERVIX: no lesions or cervical motion tenderness  UTERUS: normal size, contour, position, consistency, mobility, non-tender  ADNEXA: no mass, fullness, tenderness      ASSESSMENT:   1. Mild dysplasia of cervix         PLAN:       Discussed surgery, path, follow up 6 months.  May resume relations in 1 week  Return to clinic 6 months.    "

## 2020-12-09 ENCOUNTER — TELEPHONE (OUTPATIENT)
Dept: OBSTETRICS AND GYNECOLOGY | Facility: CLINIC | Age: 55
End: 2020-12-09

## 2020-12-09 NOTE — TELEPHONE ENCOUNTER
----- Message from Reji Becerril sent at 12/9/2020  2:25 PM CST -----  Patient is calling to find out what's her blood type, pt said her blood type is not on her portal she just wants to know what is her blood type. Patient phone number is [phone]. Thanks

## 2020-12-09 NOTE — TELEPHONE ENCOUNTER
Patient advised to review portal for all blood test results. Online account help line provided to assist with restoring password.

## 2020-12-09 NOTE — TELEPHONE ENCOUNTER
----- Message from Rimma Moscoso sent at 12/9/2020  1:21 PM CST -----  Patient is calling in reference to her last procedure she had and is looking for her blood type. If on file can you please contact pt to tell her the type. Patient can be reached at [phone]        Hui HOOKER

## 2020-12-15 ENCOUNTER — HOSPITAL ENCOUNTER (OUTPATIENT)
Dept: RADIOLOGY | Facility: OTHER | Age: 55
Discharge: HOME | End: 2020-12-15
Attending: OBSTETRICS & GYNECOLOGY
Payer: MEDICAID

## 2020-12-15 DIAGNOSIS — Z12.39 BREAST CANCER SCREENING: ICD-10-CM

## 2020-12-15 DIAGNOSIS — Z12.31 BREAST CANCER SCREENING BY MAMMOGRAM: ICD-10-CM

## 2020-12-15 PROCEDURE — 77067 SCR MAMMO BI INCL CAD: CPT | Mod: TC

## 2020-12-15 PROCEDURE — 77063 MAMMO DIGITAL SCREENING BILAT WITH TOMO: ICD-10-PCS | Mod: 26,,, | Performed by: RADIOLOGY

## 2020-12-15 PROCEDURE — 77067 MAMMO DIGITAL SCREENING BILAT WITH TOMO: ICD-10-PCS | Mod: 26,,, | Performed by: RADIOLOGY

## 2020-12-15 PROCEDURE — 77067 SCR MAMMO BI INCL CAD: CPT | Mod: 26,,, | Performed by: RADIOLOGY

## 2020-12-15 PROCEDURE — 77063 BREAST TOMOSYNTHESIS BI: CPT | Mod: 26,,, | Performed by: RADIOLOGY

## 2021-04-16 ENCOUNTER — PATIENT MESSAGE (OUTPATIENT)
Dept: RESEARCH | Facility: HOSPITAL | Age: 56
End: 2021-04-16

## 2023-12-06 LAB
CHOL/HDLC RATIO: 3.7
CHOLEST SERPL-MCNC: 264 MG/DL
CHOLEST SERPL-MCNC: 98 MG/DL
HDLC SERPL-MCNC: 72 MG/DL
LDLC SERPL CALC-MCNC: 170 MG/DL
NON HDL CHOL (CALC): 192

## 2024-03-05 ENCOUNTER — OFFICE VISIT (OUTPATIENT)
Dept: PRIMARY CARE CLINIC | Facility: CLINIC | Age: 59
End: 2024-03-05
Payer: COMMERCIAL

## 2024-03-05 VITALS
DIASTOLIC BLOOD PRESSURE: 60 MMHG | HEART RATE: 77 BPM | TEMPERATURE: 98 F | HEIGHT: 62 IN | WEIGHT: 105.63 LBS | RESPIRATION RATE: 16 BRPM | BODY MASS INDEX: 19.44 KG/M2 | OXYGEN SATURATION: 97 % | SYSTOLIC BLOOD PRESSURE: 114 MMHG

## 2024-03-05 DIAGNOSIS — D84.9 IMMUNE DEFICIENCY DISORDER: ICD-10-CM

## 2024-03-05 DIAGNOSIS — Z87.42 HISTORY OF ABNORMAL CERVICAL PAP SMEAR: ICD-10-CM

## 2024-03-05 DIAGNOSIS — Z86.010 HISTORY OF COLON POLYPS: ICD-10-CM

## 2024-03-05 DIAGNOSIS — G43.819 OTHER MIGRAINE WITHOUT STATUS MIGRAINOSUS, INTRACTABLE: ICD-10-CM

## 2024-03-05 DIAGNOSIS — Z76.89 ENCOUNTER TO ESTABLISH CARE: Primary | ICD-10-CM

## 2024-03-05 PROBLEM — G43.919 INTRACTABLE MIGRAINE WITHOUT STATUS MIGRAINOSUS: Status: ACTIVE | Noted: 2024-03-05

## 2024-03-05 PROCEDURE — 99204 OFFICE O/P NEW MOD 45 MIN: CPT | Mod: S$GLB,,, | Performed by: STUDENT IN AN ORGANIZED HEALTH CARE EDUCATION/TRAINING PROGRAM

## 2024-03-05 PROCEDURE — 3074F SYST BP LT 130 MM HG: CPT | Mod: CPTII,S$GLB,, | Performed by: STUDENT IN AN ORGANIZED HEALTH CARE EDUCATION/TRAINING PROGRAM

## 2024-03-05 PROCEDURE — 3078F DIAST BP <80 MM HG: CPT | Mod: CPTII,S$GLB,, | Performed by: STUDENT IN AN ORGANIZED HEALTH CARE EDUCATION/TRAINING PROGRAM

## 2024-03-05 PROCEDURE — 1160F RVW MEDS BY RX/DR IN RCRD: CPT | Mod: CPTII,S$GLB,, | Performed by: STUDENT IN AN ORGANIZED HEALTH CARE EDUCATION/TRAINING PROGRAM

## 2024-03-05 PROCEDURE — 3008F BODY MASS INDEX DOCD: CPT | Mod: CPTII,S$GLB,, | Performed by: STUDENT IN AN ORGANIZED HEALTH CARE EDUCATION/TRAINING PROGRAM

## 2024-03-05 PROCEDURE — 1159F MED LIST DOCD IN RCRD: CPT | Mod: CPTII,S$GLB,, | Performed by: STUDENT IN AN ORGANIZED HEALTH CARE EDUCATION/TRAINING PROGRAM

## 2024-03-05 PROCEDURE — 99999 PR PBB SHADOW E&M-EST. PATIENT-LVL V: CPT | Mod: PBBFAC,,, | Performed by: STUDENT IN AN ORGANIZED HEALTH CARE EDUCATION/TRAINING PROGRAM

## 2024-03-05 RX ORDER — PREDNISONE 20 MG/1
40 TABLET ORAL DAILY
COMMUNITY
Start: 2023-12-05

## 2024-03-05 RX ORDER — SPIRONOLACTONE 50 MG/1
50 TABLET, FILM COATED ORAL DAILY
COMMUNITY
Start: 2024-02-07

## 2024-03-05 RX ORDER — BUTALBITAL, ACETAMINOPHEN AND CAFFEINE 50; 325; 40 MG/1; MG/1; MG/1
1 TABLET ORAL
Qty: 30 TABLET | Refills: 0 | Status: SHIPPED | OUTPATIENT
Start: 2024-03-05

## 2024-03-05 RX ORDER — ESCITALOPRAM OXALATE 5 MG/1
5 TABLET ORAL NIGHTLY
COMMUNITY
Start: 2024-02-19 | End: 2024-04-24

## 2024-03-05 NOTE — PROGRESS NOTES
Subjective:           Patient ID: Johana Wright   Age:  58 y.o.  Sex: female     Chief Complaint:   Establish Care and Annual Exam      History of Present Illness:    Johana Wright is a 58 y.o. female who presents today with a chief complaint of Establish Care and Annual Exam  .      Last provider she saw was Dr. Franco, and is looking for new provider in [facility].      Hx of Miagraines.  Started back as a teen.  Hx of these in her mother as well - though stopped with hysterectomy.  Patient was having infrequently until few months ago, but having more now, 10 in the last couple months, are debilitating.  Can lose vision, get nausea/vomiting.  Has photophobia/phonophobia.      Has used imitrex but got nausea and vomiting.   Also used Furinol without coedine as well.      States after her 3rd episode of COVID or surgery has started getting blisters that then itch and rupture to the skin.  Saw an allergist, thinks is hives.   Using Zolair injection monthly, has done 3 so far and has not seen any improvement.      States in Dec 2022 had mold in the walls she was exposed to and missed a total of about 30 days of work in the next few months as a result.    Derm: taking Spironolactone and RetinA from derm for skin issues.          Review of Systems   Constitutional:  Negative for activity change, fatigue, fever and unexpected weight change.   HENT:  Negative for congestion, nosebleeds, sinus pressure and sneezing.    Respiratory:  Negative for cough, shortness of breath and wheezing.    Cardiovascular:  Negative for chest pain, palpitations and leg swelling.   Gastrointestinal:  Negative for abdominal distention, constipation, diarrhea and nausea.   Genitourinary:  Negative for difficulty urinating and dysuria.   Musculoskeletal:  Negative for back pain and gait problem.   Skin:  Negative for pallor and rash.   Neurological:  Positive for headaches. Negative for weakness and numbness.  "  Psychiatric/Behavioral:  Negative for agitation. The patient is not nervous/anxious.            Objective:        Vitals:    03/05/24 1404   BP: 114/60   BP Location: Right arm   Patient Position: Sitting   BP Method: Medium (Manual)   Pulse: 77   Resp: 16   Temp: 98.3 °F (36.8 °C)   TempSrc: Temporal   SpO2: 97%   Weight: 47.9 kg (105 lb 9.6 oz)   Height: 5' 2" (1.575 m)       Body mass index is 19.31 kg/m².      Physical Exam  Constitutional:       General: She is not in acute distress.     Appearance: Normal appearance.   HENT:      Head: Normocephalic and atraumatic.      Right Ear: External ear normal.      Left Ear: External ear normal.      Nose: No rhinorrhea.      Mouth/Throat:      Mouth: Mucous membranes are moist.   Eyes:      Extraocular Movements: Extraocular movements intact.      Conjunctiva/sclera: Conjunctivae normal.   Cardiovascular:      Rate and Rhythm: Normal rate.   Pulmonary:      Effort: Pulmonary effort is normal. No respiratory distress.   Musculoskeletal:      Right lower leg: No edema.      Left lower leg: No edema.   Skin:     Coloration: Skin is not jaundiced.      Findings: No bruising.   Neurological:      General: No focal deficit present.      Mental Status: She is alert and oriented to person, place, and time.      Motor: No weakness.      Gait: Gait normal.   Psychiatric:         Mood and Affect: Mood normal.           Past Medical History:   Diagnosis Date    Abnormal Pap smear of cervix age 29    cryo    Breast disorder        No results found for: "NA", "K", "CL", "CO2", "BUN", "CREATININE", "GLUCOSE", "ANIONGAP"  No results found for: "HGBA1C"  No results found for: "BNP", "BNPTRIAGEBLO"    Lab Results   Component Value Date    WBC 4.91 06/29/2020    HGB 13.1 06/29/2020    HCT 41.2 06/29/2020     06/29/2020    GRAN 2.6 06/29/2020    GRAN 52.4 06/29/2020     No results found for: "CHOL", "HDL", "LDLCALC", "TRIG"     Outpatient Encounter Medications as of 3/5/2024 "   Medication Sig Dispense Refill    EScitalopram oxalate (LEXAPRO) 5 MG Tab Take 5 mg by mouth nightly.      ibuprofen (ADVIL,MOTRIN) 800 MG tablet Take 1 tablet (800 mg total) by mouth every 8 (eight) hours as needed for Pain. 30 tablet 0    predniSONE (DELTASONE) 20 MG tablet Take 40 mg by mouth once daily.      spironolactone (ALDACTONE) 50 MG tablet Take 50 mg by mouth once daily.      tretinoin (RETIN-A) 0.05 % cream   12    butalbital-acetaminophen-caffeine -40 mg (FIORICET, ESGIC) -40 mg per tablet Take 1 tablet by mouth every 48 hours as needed for Headaches. 30 tablet 0    immun glob G,IgG,/pro/IgA 0-50 (PRIVIGEN IV) Inject into the vein every 28 days. Per Ab deficiency      [DISCONTINUED] HYDROcodone-acetaminophen (NORCO) 5-325 mg per tablet Take 1 tablet by mouth every 4 (four) hours as needed for Pain. 10 tablet 0    [DISCONTINUED] paroxetine (PAXIL) 20 MG tablet        Facility-Administered Encounter Medications as of 3/5/2024   Medication Dose Route Frequency Provider Last Rate Last Admin    dextrose 5 % and 0.45 % NaCl with KCl 20 mEq infusion   Intravenous Continuous Talisha Joshua MD              Assessment:       1. Encounter to establish care    2. Other migraine without status migrainosus, intractable    3. Immune deficiency disorder    4. History of abnormal cervical Pap smear    5. History of colon polyps           Plan:             Encounter to establish care   - 58-year-old female presenting to establish care.  Has history of migraines, immune deficiency disorder and skin issues being treated by derm.    Other migraine without status migrainosus, intractable  -     Ambulatory referral/consult to Neurology; Future; Expected date: 03/12/2024  -     butalbital-acetaminophen-caffeine -40 mg (FIORICET, ESGIC) -40 mg per tablet; Take 1 tablet by mouth every 48 hours as needed for Headaches.  Dispense: 30 tablet; Refill: 0    Immune deficiency disorder:   - patient  reports history of immune deficiency disorder.  Has condition resulting in decreased immunoglobulins and has an IV infusion monthly of Privigen.    - states gets frequent infections with this deficiency and has been doing better under their care.     History of abnormal cervical Pap smear:   - patient has history of abnormal Pap.  Follows with OB, will be getting well-woman exam and mammogram done later this year.    History of colon polyps:   - patient states she would colonoscopy completed last year and was told to follow up every 5 years.  Will try to get records, likely repeat in 2028.

## 2024-03-05 NOTE — PATIENT INSTRUCTIONS
Encounter to establish care   - 58-year-old female presenting to establish care.  Has history of migraines, immune deficiency disorder and skin issues being treated by derm.    Other migraine without status migrainosus, intractable  -     Ambulatory referral/consult to Neurology; Future; Expected date: 03/12/2024  -     butalbital-acetaminophen-caffeine -40 mg (FIORICET, ESGIC) -40 mg per tablet; Take 1 tablet by mouth every 48 hours as needed for Headaches.  Dispense: 30 tablet; Refill: 0    Immune deficiency disorder:   - patient reports history of immune deficiency disorder.  Has condition resulting in decreased immunoglobulins and has an IV infusion monthly of Privigen.    - states gets frequent infections with this deficiency and has been doing better under their care.     History of abnormal cervical Pap smear:   - patient has history of abnormal Pap.  Follows with OB, will be getting well-woman exam and mammogram done later this year.    History of colon polyps:   - patient states she would colonoscopy completed last year and was told to follow up every 5 years.  Will try to get records, likely repeat in 2028.

## 2024-03-07 DIAGNOSIS — Z12.31 OTHER SCREENING MAMMOGRAM: ICD-10-CM

## 2024-03-12 ENCOUNTER — PATIENT MESSAGE (OUTPATIENT)
Dept: ADMINISTRATIVE | Facility: HOSPITAL | Age: 59
End: 2024-03-12
Payer: COMMERCIAL

## 2024-03-27 ENCOUNTER — OFFICE VISIT (OUTPATIENT)
Dept: OBSTETRICS AND GYNECOLOGY | Facility: CLINIC | Age: 59
End: 2024-03-27
Attending: OBSTETRICS & GYNECOLOGY
Payer: COMMERCIAL

## 2024-03-27 VITALS
SYSTOLIC BLOOD PRESSURE: 116 MMHG | WEIGHT: 106.69 LBS | BODY MASS INDEX: 19.63 KG/M2 | DIASTOLIC BLOOD PRESSURE: 64 MMHG | HEIGHT: 62 IN

## 2024-03-27 DIAGNOSIS — Z01.419 WELL FEMALE EXAM WITH ROUTINE GYNECOLOGICAL EXAM: Primary | ICD-10-CM

## 2024-03-27 PROCEDURE — 3008F BODY MASS INDEX DOCD: CPT | Mod: CPTII,S$GLB,, | Performed by: OBSTETRICS & GYNECOLOGY

## 2024-03-27 PROCEDURE — 99999 PR PBB SHADOW E&M-EST. PATIENT-LVL III: CPT | Mod: PBBFAC,,, | Performed by: OBSTETRICS & GYNECOLOGY

## 2024-03-27 PROCEDURE — 1160F RVW MEDS BY RX/DR IN RCRD: CPT | Mod: CPTII,S$GLB,, | Performed by: OBSTETRICS & GYNECOLOGY

## 2024-03-27 PROCEDURE — 3078F DIAST BP <80 MM HG: CPT | Mod: CPTII,S$GLB,, | Performed by: OBSTETRICS & GYNECOLOGY

## 2024-03-27 PROCEDURE — 99396 PREV VISIT EST AGE 40-64: CPT | Mod: S$GLB,,, | Performed by: OBSTETRICS & GYNECOLOGY

## 2024-03-27 PROCEDURE — 88141 CYTOPATH C/V INTERPRET: CPT | Mod: ,,, | Performed by: PATHOLOGY

## 2024-03-27 PROCEDURE — 1159F MED LIST DOCD IN RCRD: CPT | Mod: CPTII,S$GLB,, | Performed by: OBSTETRICS & GYNECOLOGY

## 2024-03-27 PROCEDURE — 3074F SYST BP LT 130 MM HG: CPT | Mod: CPTII,S$GLB,, | Performed by: OBSTETRICS & GYNECOLOGY

## 2024-03-27 PROCEDURE — 88175 CYTOPATH C/V AUTO FLUID REDO: CPT | Performed by: PATHOLOGY

## 2024-03-27 RX ORDER — VENLAFAXINE 25 MG/1
TABLET ORAL
COMMUNITY
Start: 2024-03-21

## 2024-03-27 NOTE — PROGRESS NOTES
SUBJECTIVE:   59 y.o. female   for routine gyn exam. No LMP recorded. Patient is postmenopausal..  She has no unusual complaints. No PMB. No HRT.         Past Medical History:   Diagnosis Date    Abnormal Pap smear of cervix age 29    cryo    Breast disorder      Past Surgical History:   Procedure Laterality Date    BREAST SURGERY  2023    implant removal and replacment    BREAST SURGERY Bilateral 2021    implant removal and replacement    CERVICAL BIOPSY  W/ LOOP ELECTRODE EXCISION      2020    COLD KNIFE CONIZATION OF CERVIX N/A 2020    Procedure: CONE BIOPSY, CERVIX, USING COLD KNIFE;  Surgeon: Talisha Joshua MD;  Location: [facility];  Service: OB/GYN;  Laterality: N/A;    HAND SURGERY      LASER ABLATION OF THE CERVIX  age 29    cryo ?    RECTAL PROLAPSE REPAIR  2019    SINUS SURGERY      TONSILLECTOMY, ADENOIDECTOMY      TUBAL LIGATION       Social History     Socioeconomic History    Marital status:    Tobacco Use    Smoking status: Never    Smokeless tobacco: Never   Substance and Sexual Activity    Alcohol use: Yes     Alcohol/week: 14.0 standard drinks of alcohol     Types: 14 Glasses of wine per week     Comment: about 2 glasses of wine per day.  Appx 14 in week.    Drug use: No    Sexual activity: Not Currently     Partners: Male     Birth control/protection: Surgical     Family History   Problem Relation Age of Onset    Diabetes Mellitus Mother     Stroke Mother         6 months after  passed.    Colon cancer Father 89    Heart attack Father 62        then subsequent    Coronary artery disease Father     Breast cancer Neg Hx     Ovarian cancer Neg Hx     Lung cancer Neg Hx      OB History    Para Term  AB Living   2 0 0   2 0   SAB IAB Ectopic Multiple Live Births   0 2     0      # Outcome Date GA Lbr Shaheed/2nd Weight Sex Delivery Anes PTL Lv   2 IAB            1 IAB                  Current Outpatient Medications   Medication Sig Dispense  Refill    butalbital-acetaminophen-caffeine -40 mg (FIORICET, ESGIC) -40 mg per tablet Take 1 tablet by mouth every 48 hours as needed for Headaches. 30 tablet 0    EScitalopram oxalate (LEXAPRO) 5 MG Tab Take 5 mg by mouth nightly.      ibuprofen (ADVIL,MOTRIN) 800 MG tablet Take 1 tablet (800 mg total) by mouth every 8 (eight) hours as needed for Pain. 30 tablet 0    immun glob G,IgG,/pro/IgA 0-50 (PRIVIGEN IV) Inject into the vein every 28 days. Per Ab deficiency      predniSONE (DELTASONE) 20 MG tablet Take 40 mg by mouth once daily.      spironolactone (ALDACTONE) 50 MG tablet Take 50 mg by mouth once daily.      tretinoin (RETIN-A) 0.05 % cream   12    venlafaxine (EFFEXOR) 25 MG Tab Take by mouth.       No current facility-administered medications for this visit.     Allergies: Adhesive     ROS:  Constitutional: no weight loss, weight gain, fever, fatigue  Eyes:  No vision changes, glasses/contacts  ENT/Mouth: No ulcers, sinus problems, ears ringing, headache  Cardiovascular: No inability to lie flat, chest pain, exercise intolerance, swelling, heart palpitations  Respiratory: No wheezing, coughing blood, shortness of breath, or cough  Gastrointestinal: No diarrhea, bloody stool, nausea/vomiting, constipation, gas, hemorrhoids  Genitourinary: No blood in urine, painful urination, urgency of urination, frequency of urination, incomplete emptying, incontinence, abnormal bleeding, painful periods, heavy periods, vaginal discharge, vaginal odor, painful intercourse, sexual problems, bleeding after intercourse.  Musculoskeletal: No muscle weakness  Skin/Breast: No painful breasts, nipple discharge, masses, rash, ulcers  Neurological: No passing out, seizures, numbness, headache  Endocrine: No diabetes, hypothyroid, hyperthyroid, hot flashes, hair loss, abnormal hair growth, acne  Psychiatric: No depression, crying  Hematologic: No bruises, bleeding, swollen lymph nodes, anemia.      OBJECTIVE:   The  "patient appears well, alert, oriented x 3, in no distress.  /64   Ht 5' 2" (1.575 m)   Wt 48.4 kg (106 lb 11.2 oz)   BMI 19.52 kg/m²   NECK: no thyromegaly, trachea midline  SKIN: no acne, striae, hirsutism  CHEST: CTAB  CV: RRR  BREAST EXAM: breasts appear normal, no suspicious masses, no skin or nipple changes or axillary nodes  ABDOMEN: no hernias, masses, or hepatosplenomegaly  GENITALIA: normal external genitalia, no erythema, no discharge  URETHRA: normal urethra, normal urethral meatus  VAGINA: Normal  CERVIX: no lesions or cervical motion tenderness  UTERUS: normal size, contour, position, consistency, mobility, non-tender  ADNEXA: no mass, fullness, tenderness      ASSESSMENT:   1. Well female exam with routine gynecological exam  Liquid-Based Pap Smear, Screening          PLAN:   Orders Placed This Encounter    Liquid-Based Pap Smear, Screening     Discussed healthy lifestyle including regular exercise, healthy eating, etc.  Return to clinic in 1 year    "

## 2024-04-04 DIAGNOSIS — M79.642 LEFT HAND PAIN: Primary | ICD-10-CM

## 2024-04-08 LAB
FINAL PATHOLOGIC DIAGNOSIS: NORMAL
Lab: NORMAL

## 2024-04-23 ENCOUNTER — TELEPHONE (OUTPATIENT)
Dept: ORTHOPEDICS | Facility: CLINIC | Age: 59
End: 2024-04-23
Payer: COMMERCIAL

## 2024-04-23 ENCOUNTER — PATIENT MESSAGE (OUTPATIENT)
Dept: ORTHOPEDICS | Facility: CLINIC | Age: 59
End: 2024-04-23
Payer: COMMERCIAL

## 2024-04-23 NOTE — TELEPHONE ENCOUNTER
Hello, this is a message to notify you regarding your xray appointment at [facility] Location - 1st Floor [address] , Please park in [address] and use [place] elevators 45 minutes prior to your appointment time for X-ray. After your X-ray please proceed to 9th Floor [address] for Appointment on 4/24/24 with Dr. Nowak for x-rays.     Please arrive a little early to check in prior to your appointment approximately at 7:30 AM.     Xray does run behind sometimes, we do appreciate your patience in advance. If you would like to get your xray before your appointment please reschedule your xray at a location near you at your convenience through the [facility].    *Please arrive at your informed time above, if you are more than 15 Minutes late to your appointment with Dr. Nowak we will have to reschedule your appointment. This will allow you to be seen in a timely manor and be conscious to other patients being seen that same day*     Please respond to this message to confirm you received this notification.

## 2024-04-24 ENCOUNTER — DOCUMENTATION ONLY (OUTPATIENT)
Dept: ORTHOPEDICS | Facility: CLINIC | Age: 59
End: 2024-04-24

## 2024-04-24 ENCOUNTER — OFFICE VISIT (OUTPATIENT)
Dept: ORTHOPEDICS | Facility: CLINIC | Age: 59
End: 2024-04-24
Payer: COMMERCIAL

## 2024-04-24 ENCOUNTER — HOSPITAL ENCOUNTER (OUTPATIENT)
Dept: RADIOLOGY | Facility: OTHER | Age: 59
Discharge: HOME | End: 2024-04-24
Attending: PHYSICIAN ASSISTANT
Payer: COMMERCIAL

## 2024-04-24 DIAGNOSIS — M72.0: ICD-10-CM

## 2024-04-24 DIAGNOSIS — Z01.818 PRE-OP TESTING: ICD-10-CM

## 2024-04-24 DIAGNOSIS — M79.642 LEFT HAND PAIN: ICD-10-CM

## 2024-04-24 DIAGNOSIS — G56.01 CARPAL TUNNEL SYNDROME OF RIGHT WRIST: ICD-10-CM

## 2024-04-24 DIAGNOSIS — M72.0 DUPUYTREN'S DISEASE OF PALM OF RIGHT HAND: ICD-10-CM

## 2024-04-24 DIAGNOSIS — M72.0 DUPUYTREN'S CONTRACTURE OF LEFT HAND: Primary | ICD-10-CM

## 2024-04-24 PROBLEM — E78.2 MIXED HYPERLIPIDEMIA: Status: ACTIVE | Noted: 2024-04-24

## 2024-04-24 PROBLEM — F41.9 ANXIETY: Status: ACTIVE | Noted: 2024-04-24

## 2024-04-24 PROCEDURE — 1160F RVW MEDS BY RX/DR IN RCRD: CPT | Mod: CPTII,S$GLB,, | Performed by: ORTHOPAEDIC SURGERY

## 2024-04-24 PROCEDURE — 3044F HG A1C LEVEL LT 7.0%: CPT | Mod: CPTII,S$GLB,, | Performed by: ORTHOPAEDIC SURGERY

## 2024-04-24 PROCEDURE — 99204 OFFICE O/P NEW MOD 45 MIN: CPT | Mod: S$GLB,,, | Performed by: ORTHOPAEDIC SURGERY

## 2024-04-24 PROCEDURE — 73130 X-RAY EXAM OF HAND: CPT | Mod: TC,FY,LT

## 2024-04-24 PROCEDURE — 1159F MED LIST DOCD IN RCRD: CPT | Mod: CPTII,S$GLB,, | Performed by: ORTHOPAEDIC SURGERY

## 2024-04-24 PROCEDURE — 73130 X-RAY EXAM OF HAND: CPT | Mod: 26,LT,, | Performed by: RADIOLOGY

## 2024-04-24 PROCEDURE — 99999 PR PBB SHADOW E&M-EST. PATIENT-LVL V: CPT | Mod: PBBFAC,,, | Performed by: ORTHOPAEDIC SURGERY

## 2024-04-24 NOTE — H&P (VIEW-ONLY)
[facility]  History & Physical  Orthopedics    SUBJECTIVE:      Chief Complaint:   Chief Complaint   Patient presents with    Left Hand - Pain       Referring Provider: Dr. Paddy Sarkar    History of Present Illness:    Patient is a 59 y.o. right hand dominant female who presents today with complaints of bilateral hand Dupuytren's disease. She underwent surgical intervention for Dupuytren's in 12/2019. Her treatment was initially managed by Dr. Birmingham. Approximately a year ago, she noticed her hand began to curl in. Another doctor suggested nocturnal taping, but the patient remains uncertain about its efficacy. She reports experiencing numbness in one of her right fingers, but does not recall experiencing numbness post-surgery. 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 Her hobbies include swimming, fishing, biking, and walking. She used a night splint for a few weeks post-surgery. She denies any cardiac or pulmonary issues.  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  The patient denies any fevers, chills, N/V, D/C and presents for evaluation.    Past Medical History:   Diagnosis Date    Abnormal Pap smear of cervix age 29    cryo    Breast disorder      Past Surgical History:   Procedure Laterality Date    BREAST SURGERY  07/2023    implant removal and replacment    BREAST SURGERY Bilateral 01/2021    implant removal and replacement    CERVICAL BIOPSY  W/ LOOP ELECTRODE EXCISION      07/09/2020    COLD KNIFE CONIZATION OF CERVIX N/A 07/09/2020    Procedure: CONE BIOPSY, CERVIX, USING COLD KNIFE;  Surgeon: Talisha Joshua MD;  Location: [facility];  Service: OB/GYN;  Laterality: N/A;    HAND SURGERY      LASER ABLATION OF THE CERVIX  age 29    cryo ?    RECTAL PROLAPSE REPAIR  04/2019    SINUS SURGERY      TONSILLECTOMY, ADENOIDECTOMY      TUBAL LIGATION        Review of patient's allergies indicates:   Allergen Reactions    Adhesive      Other reaction(s): rash, skin peels / can use paper tape     Social History     Social History Narrative    Not on file     Family History   Problem Relation Name Age of Onset    Diabetes Mellitus Mother      Stroke Mother          6 months after  passed.    Colon cancer Father  89    Heart attack Father  62        then subsequent    Coronary artery disease Father      Breast cancer Neg Hx      Ovarian cancer Neg Hx      Lung cancer Neg Hx           Current Outpatient Medications:     butalbital-acetaminophen-caffeine -40 mg (FIORICET, ESGIC) -40 mg per tablet, Take 1 tablet by mouth every 48 hours as needed for Headaches., Disp: 30 tablet, Rfl: 0    ibuprofen (ADVIL,MOTRIN) 800 MG tablet, Take 1 tablet (800 mg total) by mouth every 8 (eight) hours as needed for Pain., Disp: 30 tablet, Rfl: 0    immun glob G,IgG,/pro/IgA 0-50 (PRIVIGEN IV), Inject into the vein every 28 days. Per Ab deficiency, Disp: , Rfl:     predniSONE (DELTASONE) 20 MG tablet, Take 40 mg by mouth once daily., Disp: , Rfl:     spironolactone (ALDACTONE) 50 MG tablet, Take 50 mg by mouth once daily., Disp: , Rfl:     tretinoin (RETIN-A) 0.05 % cream, , Disp: , Rfl: 12    venlafaxine (EFFEXOR) 25 MG Tab, Take by mouth., Disp: , Rfl:     ROS    Review of Systems:  Constitutional: no fever or chills  Eyes: no visual changes  ENT: no nasal congestion or sore throat  Respiratory: no cough or shortness of breath  Cardiovascular: no chest pain  Gastrointestinal: no nausea or vomiting, tolerating diet  Musculoskeletal: pain, soreness, and decreased ROM    OBJECTIVE:      Vital Signs (Most Recent):  There were no vitals filed for this visit.  There is no height or weight on file to calculate BMI.    Physical Exam    Physical Exam:  Constitutional: The patient appears well-developed and well-nourished. No distress.   Head: Normocephalic and atraumatic.    Nose: Nose normal.   Eyes: Conjunctivae and EOM are normal.   Neck: No tracheal deviation present.   Cardiovascular: Normal rate and intact distal pulses.    Pulmonary/Chest: Effort normal. No respiratory distress.   Abdominal: There is no guarding.   Lymphatic: Negative for adenopathy   Neurological: The patient is alert.   Psychiatric: The patient has a normal mood and affect.     Bilateral Hand/Wrist Examination:    Observation/Inspection:  Swelling  none    Deformity  none  Discoloration  none     Scars   consistent with previous surgery right hand.    Atrophy  none    HAND/WRIST EXAMINATION:  Left hand Dupuytren's disease with cords involving the long, ring and small fingers.  She has 60 degree contractures of the left long and ring MP joints.  She has a 50 degree contracture of the left small finger MP joint and 80 degree contracture of the PIP joint.  On the right she has scar consistent with previous surgery.  She has a 50 degree contracture at the PIP joint of the right long finger.  She is able to flex fully into the palm with bilateral hands.  She has bilateral thumb webspace cords which do not cause any functional deficits. + compression and Tinels right wrist.  Physical Exam     Neurovascular Exam:  Digits WWP, brisk CR < 3s throughout  NVI motor/LTS to M/R/U nerves, radial pulse 2+  2+ biceps and brachioradialis reflexes    Diagnostic studies and other clinical records review:  Results       X-rays AP, lateral and oblique left hand taken today are independently reviewed by me and shows contractures of left long and ring MP joints as well as contracture of left small MP and PIP joints.      ASSESSMENT/PLAN:    59 y.o. yo female with   Encounter Diagnoses   Name Primary?    Dupuytren's disease of palm of right hand Yes    Dupuytren's contracture of left hand     Dupuytren's disease of palm with contracture     Pre-op testing     Carpal tunnel syndrome of right wrist       Assessment & Plan  We have  discussed the natural history of dupuytren's disease and carpal tunnel with contractures including treatment options such as splinting, oral and topical anti-inflammatories and surgery.    We have discussed conservative vs. surgical treatment as well as risks, benefits and alternatives for left small, ring and long finger Dupuytren's disease with contractures.  Conservative measures have been exhausted and she would like to proceed with surgery. Surgery would include partial fasciectomy with contracture release left small, ring and long fingers. Consent signed today in clinic. Light use of the hand will be indicated for the first 4-6 weeks.     We have discussed risks of hand surgery which include but are not limited to blood clots in the legs that can travel to the lungs (pulmonary embolism). Pulmonary embolism can cause shortness of breath, chest pain, and even shock. Other risks include urinary tract infection, nausea and vomiting (usually related to pain medication), chronic pain, bleeding, nerve damage, blood vessel injury, scarring and infection of the hand which can require re-operation. Furthermore, the risks of anesthesia include potential heart, lung, kidney, and liver damage.  Informed consent was obtained.  The patient understands and would like to proceed with surgery in the near future.    The patient's pathophysiology was explained in detail with reference to x-rays, models, other visual aids as appropriate.  Treatment options were discussed in detail.  Questions were invited and answered to the patient's satisfaction. I reviewed Primary care , and other specialty's notes to better coordinate patient's care.          Aysha Nowak MD    Please be aware that this note has been generated with the assistance of [employer] voice-to-text.  Please excuse any spelling or grammatical errors.    This note was generated with the assistance of ambient listening technology. Verbal consent was obtained by the  patient and accompanying visitor(s) for the recording of patient appointment to facilitate this note. I attest to having reviewed and edited the generated note for accuracy, though some syntax or spelling errors may persist. Please contact the author of this note for any clarification.

## 2024-04-24 NOTE — PROGRESS NOTES
Patient checked in at 8:13 a.m. for her 730 a.m. scheduled x-ray appointment and was checked and upstairs at 8:33 a.m. with her being more than 15 minutes late to her appointment time upstairs.  Discussed with patient's her options    We can see the patient at the end of the day today  We can reschedule her to another day    Patient chooses to be seen at the end of the day today

## 2024-04-24 NOTE — PROGRESS NOTES
[facility]  History & Physical  Orthopedics    SUBJECTIVE:      Chief Complaint:   Chief Complaint   Patient presents with    Left Hand - Pain       Referring Provider: Dr. Paddy Sarkar    History of Present Illness:    Patient is a 59 y.o. right hand dominant female who presents today with complaints of bilateral hand Dupuytren's disease. She underwent surgical intervention for Dupuytren's in 12/2019. Her treatment was initially managed by Dr. Birmingham. Approximately a year ago, she noticed her hand began to curl in. Another doctor suggested nocturnal taping, but the patient remains uncertain about its efficacy. She reports experiencing numbness in one of her right fingers, but does not recall experiencing numbness post-surgery. 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 Her hobbies include swimming, fishing, biking, and walking. She used a night splint for a few weeks post-surgery. She denies any cardiac or pulmonary issues.  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  The patient denies any fevers, chills, N/V, D/C and presents for evaluation.    Past Medical History:   Diagnosis Date    Abnormal Pap smear of cervix age 29    cryo    Breast disorder      Past Surgical History:   Procedure Laterality Date    BREAST SURGERY  07/2023    implant removal and replacment    BREAST SURGERY Bilateral 01/2021    implant removal and replacement    CERVICAL BIOPSY  W/ LOOP ELECTRODE EXCISION      07/09/2020    COLD KNIFE CONIZATION OF CERVIX N/A 07/09/2020    Procedure: CONE BIOPSY, CERVIX, USING COLD KNIFE;  Surgeon: Talisha Joshua MD;  Location: [facility];  Service: OB/GYN;  Laterality: N/A;    HAND SURGERY      LASER ABLATION OF THE CERVIX  age 29    cryo ?    RECTAL PROLAPSE REPAIR  04/2019    SINUS SURGERY      TONSILLECTOMY, ADENOIDECTOMY      TUBAL LIGATION   Vaccine Information Statement(s) or the Emergency Use Authorization was given today. This has been reviewed, questions answered, and verbal consent given by Patient for injection(s) and administration of Influenza (Inactivated).        Patient tolerated without incident. See immunization grid for documentation.          Review of patient's allergies indicates:   Allergen Reactions    Adhesive      Other reaction(s): rash, skin peels / can use paper tape     Social History     Social History Narrative    Not on file     Family History   Problem Relation Name Age of Onset    Diabetes Mellitus Mother      Stroke Mother          6 months after  passed.    Colon cancer Father  89    Heart attack Father  62        then subsequent    Coronary artery disease Father      Breast cancer Neg Hx      Ovarian cancer Neg Hx      Lung cancer Neg Hx           Current Outpatient Medications:     butalbital-acetaminophen-caffeine -40 mg (FIORICET, ESGIC) -40 mg per tablet, Take 1 tablet by mouth every 48 hours as needed for Headaches., Disp: 30 tablet, Rfl: 0    ibuprofen (ADVIL,MOTRIN) 800 MG tablet, Take 1 tablet (800 mg total) by mouth every 8 (eight) hours as needed for Pain., Disp: 30 tablet, Rfl: 0    immun glob G,IgG,/pro/IgA 0-50 (PRIVIGEN IV), Inject into the vein every 28 days. Per Ab deficiency, Disp: , Rfl:     predniSONE (DELTASONE) 20 MG tablet, Take 40 mg by mouth once daily., Disp: , Rfl:     spironolactone (ALDACTONE) 50 MG tablet, Take 50 mg by mouth once daily., Disp: , Rfl:     tretinoin (RETIN-A) 0.05 % cream, , Disp: , Rfl: 12    venlafaxine (EFFEXOR) 25 MG Tab, Take by mouth., Disp: , Rfl:     ROS    Review of Systems:  Constitutional: no fever or chills  Eyes: no visual changes  ENT: no nasal congestion or sore throat  Respiratory: no cough or shortness of breath  Cardiovascular: no chest pain  Gastrointestinal: no nausea or vomiting, tolerating diet  Musculoskeletal: pain, soreness, and decreased ROM    OBJECTIVE:      Vital Signs (Most Recent):  There were no vitals filed for this visit.  There is no height or weight on file to calculate BMI.    Physical Exam    Physical Exam:  Constitutional: The patient appears well-developed and well-nourished. No distress.   Head: Normocephalic and atraumatic.    Nose: Nose normal.   Eyes: Conjunctivae and EOM are normal.   Neck: No tracheal deviation present.   Cardiovascular: Normal rate and intact distal pulses.    Pulmonary/Chest: Effort normal. No respiratory distress.   Abdominal: There is no guarding.   Lymphatic: Negative for adenopathy   Neurological: The patient is alert.   Psychiatric: The patient has a normal mood and affect.     Bilateral Hand/Wrist Examination:    Observation/Inspection:  Swelling  none    Deformity  none  Discoloration  none     Scars   consistent with previous surgery right hand.    Atrophy  none    HAND/WRIST EXAMINATION:  Left hand Dupuytren's disease with cords involving the long, ring and small fingers.  She has 60 degree contractures of the left long and ring MP joints.  She has a 50 degree contracture of the left small finger MP joint and 80 degree contracture of the PIP joint.  On the right she has scar consistent with previous surgery.  She has a 50 degree contracture at the PIP joint of the right long finger.  She is able to flex fully into the palm with bilateral hands.  She has bilateral thumb webspace cords which do not cause any functional deficits. + compression and Tinels right wrist.  Physical Exam     Neurovascular Exam:  Digits WWP, brisk CR < 3s throughout  NVI motor/LTS to M/R/U nerves, radial pulse 2+  2+ biceps and brachioradialis reflexes    Diagnostic studies and other clinical records review:  Results       X-rays AP, lateral and oblique left hand taken today are independently reviewed by me and shows contractures of left long and ring MP joints as well as contracture of left small MP and PIP joints.      ASSESSMENT/PLAN:    59 y.o. yo female with   Encounter Diagnoses   Name Primary?    Dupuytren's disease of palm of right hand Yes    Dupuytren's contracture of left hand     Dupuytren's disease of palm with contracture     Pre-op testing     Carpal tunnel syndrome of right wrist       Assessment & Plan  We have  discussed the natural history of dupuytren's disease and carpal tunnel with contractures including treatment options such as splinting, oral and topical anti-inflammatories and surgery.    We have discussed conservative vs. surgical treatment as well as risks, benefits and alternatives for left small, ring and long finger Dupuytren's disease with contractures.  Conservative measures have been exhausted and she would like to proceed with surgery. Surgery would include partial fasciectomy with contracture release left small, ring and long fingers. Consent signed today in clinic. Light use of the hand will be indicated for the first 4-6 weeks.     We have discussed risks of hand surgery which include but are not limited to blood clots in the legs that can travel to the lungs (pulmonary embolism). Pulmonary embolism can cause shortness of breath, chest pain, and even shock. Other risks include urinary tract infection, nausea and vomiting (usually related to pain medication), chronic pain, bleeding, nerve damage, blood vessel injury, scarring and infection of the hand which can require re-operation. Furthermore, the risks of anesthesia include potential heart, lung, kidney, and liver damage.  Informed consent was obtained.  The patient understands and would like to proceed with surgery in the near future.    The patient's pathophysiology was explained in detail with reference to x-rays, models, other visual aids as appropriate.  Treatment options were discussed in detail.  Questions were invited and answered to the patient's satisfaction. I reviewed Primary care , and other specialty's notes to better coordinate patient's care.          Aysha Nowak MD    Please be aware that this note has been generated with the assistance of [employer] voice-to-text.  Please excuse any spelling or grammatical errors.    This note was generated with the assistance of ambient listening technology. Verbal consent was obtained by the  patient and accompanying visitor(s) for the recording of patient appointment to facilitate this note. I attest to having reviewed and edited the generated note for accuracy, though some syntax or spelling errors may persist. Please contact the author of this note for any clarification.

## 2024-04-24 NOTE — PATIENT INSTRUCTIONS
[facility]  HAND CLINIC SURGERY INSTRUCTIONS  Aysha Nowak MD  Date of Surgery: 5.16.2024    Location of Surgery:      [x] [place], [facility]    [address] [place] [address]    [] [facility]  [address]st [place] [address]    [] [facility]  [address] 2nd [place] [address]    PRE-OPERATIVE INSTRUCTIONS:    Dr. Nowak's clinic staff will call you THE DAY BEFORE SURGERY with your arrival time. You will be notified in the afternoon between 3:00p-5:00pm. We will attempt to provide your arrival time earlier and will call you if this is at all possible.     DO NOT eat or drink after midnight, this includes gum/hard candy, coffee.   You may have ONLY SMALL sips of WATER the morning of surgery to take your medication, NOTHING ELSE.    You ARE NOT to drive or take an Uber/Lyft/taxi home from surgery. Please arrange a friend/family member to accompany you at the surgery center and drive you home.  Transportation: sister Bailee    PLEASE REMOVE nail polish and/or artificial nails prior to your surgery date if applicable. [] NA    PRE-OPERATIVE MEDICATION INSTRUCTIONS:    If you are diabetic, DO NOT take any diabetic medication the night before surgery or morning of surgery. If you are type I diabetic on an insulin pump, please bring your monitor the morning of surgery.   [] NA    MUST HOLD SEMAGLUTIDE MEDICATIONS 7 DAYS PRIOR TO SURGERY, examples: Mounjaro, Ozempic, Trulicity.   [] NA    If you have high blood pressure please take your medication in the morning like normal with a SIP OF WATER; with the exception of any Angiotensin receptor blocker (end in sartan) and ACE inhibitors (end in pril).  [] NA    If you are taking blood thinners (Aspirin, Eliquis, Lovenox, Coumadin, etc), our office will contact the prescribing physician for guidance on when you are to stop/re-start your blood  thinner medication.   [] NA    Other medications to dose with a SIP OF WATER AM of surgery:   [] NA    Other Important Medication Instructions:   [] NA    Please HOLD Vitamins 1-7 days prior to surgery, CONTINUE Vitamin D up until the AM of your surgery.    Avoid anti-inflammatory medications 5-7 days before your surgery. This includes Aleve/Naproxen, Celebrex, Meloxicam/Mobic, Voltaren/Diclofenac.   You may dose ibuprofen/Advil/Motrin up until the day before your surgery.  You may take extra strength Tylenol/Acetaminophen.    HOLD ALL OTHER MEDICATIONS AM OF SURGERY THAT ARE NOT DISCUSSED ABOVE        POST-OPERATIVE MEDICATION INSTRUCTIONS:    Your post-operative pain medication will be DELIVERED BEDSIDE to you at the surgery center by the [facility]. You DO NOT need to pick this medication up from the [facility].     TAKE post-operative pain medication as directed.   You may also take over-the-counter extra strength Tylenol/acetaminophen as directed on the bottle for breakthrough pain between dosed of prescribed pain medication.   Please note, some pain medications contain Tylenol/acetaminophen.   DO NOT exceed 3000mg of Tylenol/acetaminophen in 1 day.  You may also use an over-the-counter anti-inflammatory medication also known as an NSAID,  (Aleve/Naproxen) as directed on the bottle for breakthrough pain between doses of prescribed pain medication.  DO NOT take NSAIDs if you have been previously instructed not to by a physician.     POST-OPERATIVE INSTRUCTIONS:    DO NOT let your operative area become wet, Your dressings/bandages/splints must remain dry.   Recommend waterproof arm cast cover to be worn when showering and bathing and can be purchased on Amazon. Garbage bags, plastic shopping bags, or umbrella bags can also be used instead.    DO NOT remove any post-operative dressings/bandages/splints until you are seen by Dr. Nowak or Occupational Therapy   These dressings/bandages/splints are in  place to keep the operative site clean, dry, and in optimal healing position     ELEVATE your hand/arm above the level of your heart as much as possible to decrease post-operative swelling for first 48 hours.  Swelling after surgery is TO BE EXPECTED.      ICE the operative site following surgery for 20-30 minutes, 4-5 times per day.  Be sure to have a towel between your dressings/bandages/splint to keep from getting wet.    MOVE the parts of your hand/arm that are not immobilized in a sling or splint.   Make a gentle fist with your fingers, bend/straighten your elbow, etc.   DO NOT attempt any strengthening exercises (hand putty, exercise balls, etc) on your operative side as this can increase swelling.     *CALL the [facility] at [phone]*  If at any time following surgery your dressings/bandages/splints: get wet, come loose, feels tight, feels uncomfortable.  Or if you are concerned about possible infection, worsening pain, worsening swelling or have any other concerns regarding your surgery.   Signs of infection:  fever (over 100.3), chills, body aches, increased warmth, redness, significant increase in swelling & pain at surgical site.     OUTPATIENT FOLLOW UP:  YOU WILL BE SEEN FIRST BY [x]  DR. AUBREY ESTRADA  [] OCCUPATIONAL THERAPY 3-5 DAYS AFTER YOUR SURGERY. Your splint / soft dressing will be removed and your sutures removed if applicable.  YOU WILL BE SEEN BY DR. ESTRADA IN CLINIC 2 WEEKS AFTER SURGERY.    FMLA or Disability paperwork can be sent to [facility]  *Only needed if you are going to be out of work 2 weeks or more*  (P) [phone] (F) [phone] or email [email or web address]

## 2024-04-26 ENCOUNTER — TELEPHONE (OUTPATIENT)
Dept: ORTHOPEDICS | Facility: CLINIC | Age: 59
End: 2024-04-26

## 2024-04-26 NOTE — TELEPHONE ENCOUNTER
----- Message from Baljit Camargo sent at 4/25/2024 12:22 PM CDT -----  Contact: pt    ----- Message -----  From: Thong Long  Sent: 4/25/2024   9:07 AM CDT  To: [facility]ustin Staff    Type: Requesting to speak with nurse        Who Called: PT  Regarding: would like to confirm surgery date   Would the patient rather a call back or a response via [facility]? Call back  Best Call Back Number: [phone]   Additional Information:

## 2024-05-10 ENCOUNTER — ANESTHESIA EVENT (OUTPATIENT)
Dept: SURGERY | Facility: HOSPITAL | Age: 59
End: 2024-05-10
Payer: COMMERCIAL

## 2024-05-15 ENCOUNTER — TELEPHONE (OUTPATIENT)
Dept: ORTHOPEDICS | Facility: CLINIC | Age: 59
End: 2024-05-15
Payer: COMMERCIAL

## 2024-05-15 DIAGNOSIS — M72.0 DUPUYTREN'S CONTRACTURE OF LEFT HAND: Primary | ICD-10-CM

## 2024-05-15 RX ORDER — DOCUSATE SODIUM 100 MG/1
100 CAPSULE, LIQUID FILLED ORAL 2 TIMES DAILY
Qty: 30 CAPSULE | Refills: 1 | Status: SHIPPED | OUTPATIENT
Start: 2024-05-15

## 2024-05-15 RX ORDER — ONDANSETRON HYDROCHLORIDE 8 MG/1
8 TABLET, FILM COATED ORAL EVERY 8 HOURS PRN
Qty: 30 TABLET | Refills: 0 | Status: SHIPPED | OUTPATIENT
Start: 2024-05-15

## 2024-05-15 RX ORDER — CEFAZOLIN SODIUM 2 G/50ML
2 SOLUTION INTRAVENOUS
Status: CANCELLED | OUTPATIENT
Start: 2024-05-15

## 2024-05-15 RX ORDER — HYDROCODONE BITARTRATE AND ACETAMINOPHEN 5; 325 MG/1; MG/1
1 TABLET ORAL EVERY 6 HOURS PRN
Qty: 30 TABLET | Refills: 0 | Status: SHIPPED | OUTPATIENT
Start: 2024-05-15 | End: 2024-06-05 | Stop reason: SDUPTHER

## 2024-05-15 RX ORDER — MUPIROCIN 20 MG/G
OINTMENT TOPICAL
Status: CANCELLED | OUTPATIENT
Start: 2024-05-15

## 2024-05-15 NOTE — TELEPHONE ENCOUNTER
Spoke to patient to inform them of their surgery arrival time (730 am), [place], [address]:  Verbalized understanding of instructions for pre-wash, and reviewed NPO after midnight.   Confirmed call in regards to medication instructions from anesthesia.   Confirmed patient was NOT using a rideshare service for surgery arrival or  transportation.  Sister Ute

## 2024-05-16 ENCOUNTER — HOSPITAL ENCOUNTER (OUTPATIENT)
Facility: HOSPITAL | Age: 59
Discharge: HOME | End: 2024-05-16
Attending: ORTHOPAEDIC SURGERY | Admitting: ORTHOPAEDIC SURGERY
Payer: COMMERCIAL

## 2024-05-16 ENCOUNTER — ANESTHESIA (OUTPATIENT)
Dept: SURGERY | Facility: HOSPITAL | Age: 59
End: 2024-05-16
Payer: COMMERCIAL

## 2024-05-16 VITALS
RESPIRATION RATE: 16 BRPM | WEIGHT: 106 LBS | BODY MASS INDEX: 19.51 KG/M2 | TEMPERATURE: 98 F | SYSTOLIC BLOOD PRESSURE: 120 MMHG | OXYGEN SATURATION: 98 % | HEIGHT: 62 IN | DIASTOLIC BLOOD PRESSURE: 79 MMHG | HEART RATE: 72 BPM

## 2024-05-16 DIAGNOSIS — M72.0 DUPUYTREN'S DISEASE OF PALM OF RIGHT HAND: ICD-10-CM

## 2024-05-16 DIAGNOSIS — M72.0 DUPUYTREN'S CONTRACTURE OF LEFT HAND: ICD-10-CM

## 2024-05-16 PROCEDURE — 26125 RELEASE PALM CONTRACTURE: CPT | Mod: F4,,, | Performed by: ORTHOPAEDIC SURGERY

## 2024-05-16 PROCEDURE — D9220A PRA ANESTHESIA: Mod: CRNA,,, | Performed by: NURSE ANESTHETIST, CERTIFIED REGISTERED

## 2024-05-16 PROCEDURE — 36000706: Performed by: ORTHOPAEDIC SURGERY

## 2024-05-16 PROCEDURE — 37000009 HC ANESTHESIA EA ADD 15 MINS: Performed by: ORTHOPAEDIC SURGERY

## 2024-05-16 PROCEDURE — 64415 NJX AA&/STRD BRCH PLXS IMG: CPT | Performed by: ANESTHESIOLOGY

## 2024-05-16 PROCEDURE — 63600175 PHARM REV CODE 636 W HCPCS: Performed by: PHYSICIAN ASSISTANT

## 2024-05-16 PROCEDURE — 94761 N-INVAS EAR/PLS OXIMETRY MLT: CPT

## 2024-05-16 PROCEDURE — 25000003 PHARM REV CODE 250: Performed by: NURSE ANESTHETIST, CERTIFIED REGISTERED

## 2024-05-16 PROCEDURE — 25000003 PHARM REV CODE 250: Performed by: PHYSICIAN ASSISTANT

## 2024-05-16 PROCEDURE — D9220A PRA ANESTHESIA: Mod: ANES,,, | Performed by: ANESTHESIOLOGY

## 2024-05-16 PROCEDURE — 71000015 HC POSTOP RECOV 1ST HR: Performed by: ORTHOPAEDIC SURGERY

## 2024-05-16 PROCEDURE — 88304 TISSUE EXAM BY PATHOLOGIST: CPT | Mod: 26,,, | Performed by: STUDENT IN AN ORGANIZED HEALTH CARE EDUCATION/TRAINING PROGRAM

## 2024-05-16 PROCEDURE — 37000008 HC ANESTHESIA 1ST 15 MINUTES: Performed by: ORTHOPAEDIC SURGERY

## 2024-05-16 PROCEDURE — 63600175 PHARM REV CODE 636 W HCPCS: Performed by: NURSE ANESTHETIST, CERTIFIED REGISTERED

## 2024-05-16 PROCEDURE — 99900035 HC TECH TIME PER 15 MIN (STAT)

## 2024-05-16 PROCEDURE — 27201423 OPTIME MED/SURG SUP & DEVICES STERILE SUPPLY: Performed by: ORTHOPAEDIC SURGERY

## 2024-05-16 PROCEDURE — 71000033 HC RECOVERY, INTIAL HOUR: Performed by: ORTHOPAEDIC SURGERY

## 2024-05-16 PROCEDURE — 36000707: Performed by: ORTHOPAEDIC SURGERY

## 2024-05-16 PROCEDURE — 25000003 PHARM REV CODE 250: Performed by: ORTHOPAEDIC SURGERY

## 2024-05-16 PROCEDURE — 88304 TISSUE EXAM BY PATHOLOGIST: CPT | Performed by: STUDENT IN AN ORGANIZED HEALTH CARE EDUCATION/TRAINING PROGRAM

## 2024-05-16 PROCEDURE — 63600175 PHARM REV CODE 636 W HCPCS: Performed by: ANESTHESIOLOGY

## 2024-05-16 PROCEDURE — 26123 RELEASE PALM CONTRACTURE: CPT | Mod: FA,,, | Performed by: ORTHOPAEDIC SURGERY

## 2024-05-16 RX ORDER — ONDANSETRON HYDROCHLORIDE 2 MG/ML
INJECTION, SOLUTION INTRAVENOUS
Status: DISCONTINUED | OUTPATIENT
Start: 2024-05-16 | End: 2024-05-16

## 2024-05-16 RX ORDER — FAMOTIDINE 10 MG/ML
INJECTION INTRAVENOUS
Status: DISCONTINUED | OUTPATIENT
Start: 2024-05-16 | End: 2024-05-16

## 2024-05-16 RX ORDER — SODIUM CHLORIDE 0.9 % (FLUSH) 0.9 %
3 SYRINGE (ML) INJECTION
Status: DISCONTINUED | OUTPATIENT
Start: 2024-05-16 | End: 2024-05-16 | Stop reason: HOSPADM

## 2024-05-16 RX ORDER — CELECOXIB 200 MG/1
400 CAPSULE ORAL
Status: COMPLETED | OUTPATIENT
Start: 2024-05-16 | End: 2024-05-16

## 2024-05-16 RX ORDER — LIDOCAINE HYDROCHLORIDE 20 MG/ML
INJECTION INTRAVENOUS
Status: DISCONTINUED | OUTPATIENT
Start: 2024-05-16 | End: 2024-05-16

## 2024-05-16 RX ORDER — MUPIROCIN 20 MG/G
OINTMENT TOPICAL
Status: DISCONTINUED | OUTPATIENT
Start: 2024-05-16 | End: 2024-05-16 | Stop reason: HOSPADM

## 2024-05-16 RX ORDER — MIDAZOLAM HYDROCHLORIDE 1 MG/ML
1 INJECTION, SOLUTION INTRAMUSCULAR; INTRAVENOUS
Status: DISCONTINUED | OUTPATIENT
Start: 2024-05-16 | End: 2024-05-16 | Stop reason: HOSPADM

## 2024-05-16 RX ORDER — ROPIVACAINE HYDROCHLORIDE 5 MG/ML
INJECTION, SOLUTION EPIDURAL; INFILTRATION; PERINEURAL
Status: COMPLETED | OUTPATIENT
Start: 2024-05-16 | End: 2024-05-16

## 2024-05-16 RX ORDER — ACETAMINOPHEN 500 MG
1000 TABLET ORAL
Status: COMPLETED | OUTPATIENT
Start: 2024-05-16 | End: 2024-05-16

## 2024-05-16 RX ORDER — FENTANYL CITRATE 50 UG/ML
100 INJECTION, SOLUTION INTRAMUSCULAR; INTRAVENOUS
Status: DISCONTINUED | OUTPATIENT
Start: 2024-05-16 | End: 2024-05-16 | Stop reason: HOSPADM

## 2024-05-16 RX ORDER — ONDANSETRON HYDROCHLORIDE 2 MG/ML
4 INJECTION, SOLUTION INTRAVENOUS ONCE AS NEEDED
Status: DISCONTINUED | OUTPATIENT
Start: 2024-05-16 | End: 2024-05-16 | Stop reason: HOSPADM

## 2024-05-16 RX ORDER — CEFAZOLIN SODIUM 1 G/3ML
INJECTION, POWDER, FOR SOLUTION INTRAMUSCULAR; INTRAVENOUS
Status: DISCONTINUED | OUTPATIENT
Start: 2024-05-16 | End: 2024-05-16

## 2024-05-16 RX ORDER — PROPOFOL 10 MG/ML
VIAL (ML) INTRAVENOUS CONTINUOUS PRN
Status: DISCONTINUED | OUTPATIENT
Start: 2024-05-16 | End: 2024-05-16

## 2024-05-16 RX ORDER — MUPIROCIN 20 MG/G
OINTMENT TOPICAL 2 TIMES DAILY
Status: DISCONTINUED | OUTPATIENT
Start: 2024-05-16 | End: 2024-05-16 | Stop reason: HOSPADM

## 2024-05-16 RX ORDER — HYDROCODONE BITARTRATE AND ACETAMINOPHEN 5; 325 MG/1; MG/1
1 TABLET ORAL EVERY 4 HOURS PRN
Status: DISCONTINUED | OUTPATIENT
Start: 2024-05-16 | End: 2024-05-16 | Stop reason: HOSPADM

## 2024-05-16 RX ADMIN — CELECOXIB 400 MG: 200 CAPSULE ORAL at 08:05

## 2024-05-16 RX ADMIN — SODIUM CHLORIDE: 0.9 INJECTION, SOLUTION INTRAVENOUS at 08:05

## 2024-05-16 RX ADMIN — CEFAZOLIN 2 G: 330 INJECTION, POWDER, FOR SOLUTION INTRAMUSCULAR; INTRAVENOUS at 09:05

## 2024-05-16 RX ADMIN — ONDANSETRON 4 MG: 2 INJECTION INTRAMUSCULAR; INTRAVENOUS at 09:05

## 2024-05-16 RX ADMIN — MUPIROCIN: 20 OINTMENT TOPICAL at 08:05

## 2024-05-16 RX ADMIN — FAMOTIDINE 20 MG: 10 INJECTION, SOLUTION INTRAVENOUS at 09:05

## 2024-05-16 RX ADMIN — MIDAZOLAM HYDROCHLORIDE 2 MG: 1 INJECTION, SOLUTION INTRAMUSCULAR; INTRAVENOUS at 09:05

## 2024-05-16 RX ADMIN — CEFAZOLIN 2 G: 330 INJECTION, POWDER, FOR SOLUTION INTRAMUSCULAR; INTRAVENOUS at 02:05

## 2024-05-16 RX ADMIN — FENTANYL CITRATE 50 MCG: 50 INJECTION INTRAMUSCULAR; INTRAVENOUS at 09:05

## 2024-05-16 RX ADMIN — PROPOFOL 125 MCG/KG/MIN: 10 INJECTION, EMULSION INTRAVENOUS at 09:05

## 2024-05-16 RX ADMIN — LIDOCAINE HYDROCHLORIDE 100 MG: 20 INJECTION INTRAVENOUS at 09:05

## 2024-05-16 RX ADMIN — ROPIVACAINE HYDROCHLORIDE 20 ML: 5 INJECTION EPIDURAL; INFILTRATION; PERINEURAL at 09:05

## 2024-05-16 RX ADMIN — ACETAMINOPHEN 1000 MG: 500 TABLET ORAL at 08:05

## 2024-05-16 RX ADMIN — SODIUM CHLORIDE, SODIUM GLUCONATE, SODIUM ACETATE, POTASSIUM CHLORIDE, MAGNESIUM CHLORIDE, SODIUM PHOSPHATE, DIBASIC, AND POTASSIUM PHOSPHATE: .53; .5; .37; .037; .03; .012; .00082 INJECTION, SOLUTION INTRAVENOUS at 12:05

## 2024-05-16 NOTE — BRIEF OP NOTE
[place] - Surgery ([facility])  Surgery Department  Operative Note    SUMMARY     Date of Procedure: 5/16/2024     Procedure: Procedure(s) (LRB):  RELEASE, DUPUYTREN THUMB LONG RING & SMALL FINGER (Left)  RELEASE, CONTRACTURE, JOINT THUMB LONG, RING AND SMALL FINGER (Left)     Surgeons and Role:     * Aysha Nowak MD - Primary    Assisting Surgeon: None    Pre-Operative Diagnosis: Dupuytren's contracture of left hand [M72.0]  Dupuytren's disease of palm with contracture [M72.0]    Post-Operative Diagnosis: Post-Op Diagnosis Codes:     * Dupuytren's contracture of left hand [M72.0]     * Dupuytren's disease of palm with contracture [M72.0]    Anesthesia: Regional    Estimated Blood Loss (EBL): minimal           Implants: * No implants in log *    Specimens:   Specimen (24h ago, onward)       Start     Ordered    05/16/24 1235  Specimen to Pathology, Surgery Orthopedics  Once        Comments: Pre-op Diagnosis: Dupuytren's contracture of left hand [M72.0]Dupuytren's disease of palm with contracture [M72.0]Procedure(s):RELEASE, DUPUYTREN THUMB LONG RING & SMALL FINGERRELEASE, CONTRACTURE, JOINT THUMB LONG, RING AND SMALL FINGER Number of specimens: 1Name of specimens: 1. DUPUYTRENS FASCIA LEFT HAND     References:    Click here for ordering Quick Tip   Question Answer Comment   Procedure Type: Orthopedics    Release to patient Immediate        05/16/24 1300                            Condition: Good    Disposition: PACU - hemodynamically stable.    Attestation: Op Note Attestation: I was physically present and scrubbed for the entire procedure.

## 2024-05-16 NOTE — ANESTHESIA POSTPROCEDURE EVALUATION
Anesthesia Post Evaluation    Patient: Johana Wright    Procedure(s) Performed: Procedure(s) (LRB):  RELEASE, DUPUYTREN THUMB LONG RING & SMALL FINGER (Left)  RELEASE, CONTRACTURE, JOINT THUMB LONG, RING AND SMALL FINGER (Left)    Final Anesthesia Type: general      Patient location during evaluation: PACU  Patient participation: Yes- Able to Participate  Level of consciousness: awake and alert and oriented  Post-procedure vital signs: reviewed and stable  Pain management: adequate  Airway patency: patent    PONV status at discharge: No PONV  Anesthetic complications: no      Cardiovascular status: blood pressure returned to baseline and hemodynamically stable  Respiratory status: unassisted, room air and spontaneous ventilation  Hydration status: euvolemic  Follow-up not needed.              Vitals Value Taken Time   /84 05/16/24 1433   Temp 36.3 °C (97.3 °F) 05/16/24 1414   Pulse 79 05/16/24 1440   Resp 31 05/16/24 1440   SpO2 94 % 05/16/24 1440   Vitals shown include unfiled device data.      No case tracking events are documented in the log.      Pain/Guera Score: Pain Rating Prior to Med Admin: 0 (5/16/2024  9:38 AM)  Pain Rating Post Med Admin: 0 (5/16/2024  9:45 AM)  Guera Score: 9 (5/16/2024  2:20 PM)

## 2024-05-16 NOTE — DISCHARGE SUMMARY
[facility] - Surgery (Hospital)  Discharge Note  Short Stay    Procedure(s) (LRB):  RELEASE, DUPUYTREN THUMB LONG RING & SMALL FINGER (Left)  RELEASE, CONTRACTURE, JOINT THUMB LONG, RING AND SMALL FINGER (Left)      OUTCOME: Patient tolerated treatment/procedure well without complication and is now ready for discharge.    DISPOSITION: Home or Self Care    FINAL DIAGNOSIS:  left small, ring, long and thumb Dupuytren's contracture    FOLLOWUP: In clinic    DISCHARGE INSTRUCTIONS:    Discharge Procedure Orders   Diet general     Keep surgical extremity elevated     Ice to affected area     Lifting restrictions     No driving, operating heavy equipment or signing legal documents while taking pain medication.     Leave dressing on - Keep it clean, dry, and intact until clinic visit     Call MD for:  temperature >100.4     Call MD for:  persistent nausea and vomiting     Call MD for:  severe uncontrolled pain     Call MD for:  difficulty breathing, headache or visual disturbances     Call MD for:  redness, tenderness, or signs of infection (pain, swelling, redness, odor or green/yellow discharge around incision site)     Call MD for:  hives     Call MD for:  persistent dizziness or light-headedness     Call MD for:  extreme fatigue

## 2024-05-16 NOTE — INTERVAL H&P NOTE
The patient has been examined and the H&P has been reviewed:    I concur with the findings and changes have been noted since the H&P was written: Patient has noted progression of the thumb webspace cord, added thumb partial fasciectomy to consent as well as possible full thickness skin autograft.    Anesthesia/Surgery risks, benefits and alternative options discussed and understood by patient/family.          There are no hospital problems to display for this patient.     No

## 2024-05-16 NOTE — ANESTHESIA PROCEDURE NOTES
Supraclavicular Single Shot Nerve Block    Patient location during procedure: pre-op   Block not for primary anesthetic.  Reason for block: at surgeon's request and post-op pain management   Post-op Pain Location: L hand pain   Start time: 5/16/2024 9:41 AM  Timeout: 5/16/2024 9:40 AM   End time: 5/16/2024 9:42 AM    Staffing  Authorizing Provider: Jose Roberto Mathis MD  Performing Provider: Jose Roberto Mathis MD    Staffing  Performed by: Jose Roberto Mathis MD  Authorized by: Jose Roberto Mathis MD    Preanesthetic Checklist  Completed: patient identified, IV checked, site marked, risks and benefits discussed, surgical consent, monitors and equipment checked, pre-op evaluation and timeout performed  Peripheral Block  Patient position: supine  Prep: ChloraPrep  Patient monitoring: heart rate, cardiac monitor, continuous pulse ox, continuous capnometry and frequent blood pressure checks  Block type: supraclavicular  Laterality: left  Injection technique: single shot  Needle  Needle type: Stimuplex   Needle gauge: 22 G  Needle length: 2 in  Needle localization: anatomical landmarks and ultrasound guidance   -ultrasound image captured on disc.  Assessment  Injection assessment: negative aspiration, negative parasthesia and local visualized surrounding nerve  Paresthesia pain: none  Heart rate change: no  Slow fractionated injection: yes  Pain Tolerance: comfortable throughout block and no complaints  Medications:    Medications: ropivacaine (NAROPIN) injection 0.5% - Perineural   20 mL - 5/16/2024 9:42:00 AM    Additional Notes  VSS.  [facility] RN monitoring vitals throughout procedure.  Patient tolerated procedure well.

## 2024-05-16 NOTE — DISCHARGE INSTRUCTIONS
HAND SURGERY - Care of the Hand after Surgery       Post-Op Care  It is important to follow your orthopaedic surgeon's instructions carefully after you return home. You should ask   someone to check on you that evening. The protocols described here are general in nature. Every person and   every surgery is different so the information given here is for guidance only. If you have questions you should   contact us.     Day of Surgery    You were place in a plaster splint today to protect the surgical area during healing. Do not remove the plaster splint until you are seen by Occupational Therapy or Dr. Nowak for your first postop visit    The hand and fingers may be numb for quite some time after surgery. This is due to the anesthetic block used at the time of surgery for pain control. If you have an arm sling you may remove the sling at your convenience once you regain control of your arm from the anesthetic block.     Begin taking liquids and food as soon as you can. You should always eat some solid food, a sandwich or light meal, a little while before taking your pain meds.     Day 3  Things are much the same on the third day after your surgery. Usually you have less pain and feel like doing more.    Showering: It is important to keep the incision absolutely dry while showering or bathing (use two plastic bags over your hand) or a shower bag.   If you feel that the pain medication you were given after surgery is stronger than you really need you can reduce the dose, take it less frequently or switch to ibuprofen or Tylenol. If you received antibiotics they should be taken until the entire prescription is completed.    Day 5-7  Occupational Therapy will see you between this time. Please let us know if you are not scheduled [phone]    Day 14  We will see you back after your surgery to review with you what was done in surgery and will talk about rehab and answer any questions you may have.       HAND  SURGERY  Driving: You can drive if you are comfortable and have regained full finger movements and if you have sufficient power to control the vehicle.   Return to work: Timing of your return to work is variable according to your occupation and specific surgery. We should discuss this at your follow up visit if not already discussed prior.  Elevation: Hand elevation is important to prevent swelling and stiffness of the fingers. One minute of leaving your hand dangling negates four hours of keeping it elevated. Please remember not to walk with your hand hanging down or to sit with your hand resting in your lap. It is fine, however, to lower your hand for light use and you should get back to normal light activities as soon as possible as guided by common sense.     Post-operative exercises  [] Bend your fingers  Relax your hand. Start with your fingers straight and close together. Bend the end and middle joints of your fingers. Keep your wrist and knuckles straight. Moving slowly and smoothly, return your hand to the starting position. Repeat with your other hand. If you can, perform multiple repetitions of this exercise on each hand.    [] Open your hand wide                       Spread your fingers apart as wide as you can and hold that position. Slowly relax your fingers and bring them together. Return to the open-wide position. Repeat with each hand and gradually add to the number of repetitions.    [] Make a fist  Start with your fingers straight and spread apart. Make a loose, gentle fist and wrap your thumb around the outside of your fingers. Be careful not to squeeze your fingers together too tightly. Moving slowly and smoothly, return to the starting position. Repeat. Perform this exercise on both hands.    [] Touch your fingertips  Straighten your fingers and thumb. Bend your thumb across your palm, touching the tip of your thumb to the pad of your hand just below your pinky finger. If you can't make your  "thumb touch, just stretch as far as you can. Return your thumb to its starting position, as shown in images 1 through 3.  For the next exercise, form the letter O by touching your thumb to each fingertip, as shown in images 4 through 6. Moving slowly and smoothly, touch your index finger to your thumb, then straighten your fingers. Touch your middle finger to your thumb and straighten. Follow with your ring and pinky fingers.    [] Walk your fingers  Rest your hand on a flat surface, such as a tabletop, with your palm facing down and your fingers spread slightly apart. Moving one finger at a time, slowly walk your fingers toward your thumb. Start by lifting and moving your index finger toward your thumb. Follow by lifting and moving your middle finger toward your thumb. Proceed with moving your ring finger and then your pinky finger toward your thumb. Don't move your wrist or thumb while doing this exercise. Repeat with your other hand.      HAND SURGERY - Common Concerns and Frequently Asked Questions    When to Call the Doctor  Pain, burning, or numbness of the fingers or the back of the hand not relieved by elevation of the arm  Pale or cold finger; bluish nail beds  Red line or streak going up the arm  Excessive swelling  Fever over 100.3ºF  Pain unrelieved by pain medication    Tips for one armed living  It helps to have...   In the shower   Plastic bags and rubber bands to cover bandages - the bags that newspapers come in are good to cover the hand and wrist. Otherwise small trash can liners will do. Use two at a time.   Bottle sponge (soft sponge on a long stick) - for the armpit of your "good" hand.   Shower brush   A hair brush in the shower will help you to wash your hair.   Cotton ricky cloth bathrobe - to dry your back.    In the bathroom   Toothpaste, shampoo, etc. in flip-top or pump (not screw top) dispensers.   Consider an electric razor.   Flossers (dental floss on a "Y" shaped handle).    In the " "kitchen   Dycem mat (rubber jar opener mat) - to help open jars, but also keep things from sliding around while you are working on them.    Double suction cup pads ("little Octopus") - to hold items while you use or wash them.   Electric can opener with a lid magnet strong enough to hold the can in the air - for one handed use.   In the bedroom   Back scratcher.   Large sleeve shirts and tops.   Put away clothing which buttons, fastens or snaps in the back or which uses drawstrings.    Sports bra or a camisole instead of a bra.   [employer] nylons can be pulled on one handed - most others can't.   A "wash and wear" haircut.     "

## 2024-05-16 NOTE — PLAN OF CARE
Nursing pre-op complete. Pt calm, will continue to monitor. Call light within reach. Sister, Ute, visiting at bedside. Ute will keep pt's belongings.

## 2024-05-16 NOTE — ANESTHESIA PREPROCEDURE EVALUATION
05/16/2024  Pre-operative evaluation for Procedure(s) (LRB):  RELEASE, DUPUYTREN LONG RING & SMALL FINGER (Left)  RELEASE, CONTRACTURE, JOINT LONG, RING AND SMALL FINGER (Left)    Johana Wright is a 59 y.o. female     Patient Active Problem List   Diagnosis    History of abnormal cervical Pap smear    Intractable migraine without status migrainosus    Immune deficiency disorder    Anxiety    Mixed hyperlipidemia       Review of patient's allergies indicates:   Allergen Reactions    Adhesive      Other reaction(s): rash, skin peels / can use paper tape       No current facility-administered medications on file prior to encounter.     Current Outpatient Medications on File Prior to Encounter   Medication Sig Dispense Refill    butalbital-acetaminophen-caffeine -40 mg (FIORICET, ESGIC) -40 mg per tablet Take 1 tablet by mouth every 48 hours as needed for Headaches. 30 tablet 0    ibuprofen (ADVIL,MOTRIN) 800 MG tablet Take 1 tablet (800 mg total) by mouth every 8 (eight) hours as needed for Pain. 30 tablet 0    immun glob G,IgG,/pro/IgA 0-50 (PRIVIGEN IV) Inject into the vein every 28 days. Per Ab deficiency      predniSONE (DELTASONE) 20 MG tablet Take 40 mg by mouth once daily.      spironolactone (ALDACTONE) 50 MG tablet Take 50 mg by mouth once daily.      tretinoin (RETIN-A) 0.05 % cream   12    venlafaxine (EFFEXOR) 25 MG Tab Take by mouth.         Past Surgical History:   Procedure Laterality Date    BREAST SURGERY  07/2023    implant removal and replacment    BREAST SURGERY Bilateral 01/2021    implant removal and replacement    CERVICAL BIOPSY  W/ LOOP ELECTRODE EXCISION      07/09/2020    COLD KNIFE CONIZATION OF CERVIX N/A 07/09/2020    Procedure: CONE BIOPSY, CERVIX, USING COLD KNIFE;  Surgeon: Talisha Joshua MD;  Location: [facility];  Service: OB/GYN;  Laterality: N/A;     HAND SURGERY      LASER ABLATION OF THE CERVIX  age 29    cryo ?    RECTAL PROLAPSE REPAIR  2019    SINUS SURGERY      TONSILLECTOMY, ADENOIDECTOMY      TUBAL LIGATION         Social History     Socioeconomic History    Marital status:    Tobacco Use    Smoking status: Never    Smokeless tobacco: Never   Substance and Sexual Activity    Alcohol use: Yes     Alcohol/week: 14.0 standard drinks of alcohol     Types: 14 Glasses of wine per week     Comment: about 2 glasses of wine per day.  Appx 14 in week.    Drug use: No    Sexual activity: Not Currently     Partners: Male     Birth control/protection: Surgical     Social Determinants of Health     Financial Resource Strain: Low Risk  (2024)    Overall Financial Resource Strain (CARDIA)     Difficulty of Paying Living Expenses: Not hard at all   Food Insecurity: No Food Insecurity (2024)    Hunger Vital Sign     Worried About Running Out of Food in the Last Year: Never true     Ran Out of Food in the Last Year: Never true   Transportation Needs: No Transportation Needs (2024)    PRAPARE - Transportation     Lack of Transportation (Medical): No     Lack of Transportation (Non-Medical): No   Physical Activity: Sufficiently Active (2024)    Exercise Vital Sign     Days of Exercise per Week: 5 days     Minutes of Exercise per Session: 30 min   Stress: No Stress Concern Present (2024)    [employer] - Occupational Stress Questionnaire     Feeling of Stress : Not at all     EKD Echo:  No results found for this or any previous visit.        Pre-op Assessment    I have reviewed the Patient Summary Reports.     I have reviewed the Nursing Notes. I have reviewed the NPO Status.   I have reviewed the Medications.     Review of Systems  Anesthesia Hx:             Denies Family Hx of Anesthesia complications.    Denies Personal Hx of Anesthesia complications.                    Cardiovascular:  Exercise  tolerance: good        Denies Dysrhythmias.   Denies Angina.     hyperlipidemia  Denies WALDEN.                            Pulmonary:    Denies COPD.  Denies Asthma.                    Hepatic/GI:      Denies GERD.             Neurological:    Denies CVA.   Headaches Denies Seizures.                                Endocrine:  Denies Diabetes.               Physical Exam  General: Well nourished, Cooperative, Alert and Oriented    Airway:  Mallampati: II / I  Mouth Opening: Normal  TM Distance: Normal  Tongue: Normal  Neck ROM: Normal ROM    Dental:  Intact    Chest/Lungs:  Clear to auscultation, Normal Respiratory Rate    Heart:  Rate: Normal  Rhythm: Regular Rhythm        Anesthesia Plan  Type of Anesthesia, risks & benefits discussed:    Anesthesia Type: Gen Natural Airway, Regional  Intra-op Monitoring Plan: Standard ASA Monitors  Post Op Pain Control Plan: multimodal analgesia and peripheral nerve block  Induction:  IV  Informed Consent: Informed consent signed with the Patient and all parties understand the risks and agree with anesthesia plan.  All questions answered. Patient consented to blood products? Yes  ASA Score: 2  Day of Surgery Review of History & Physical: H&P Update referred to the surgeon/provider.    Ready For Surgery From Anesthesia Perspective.     .

## 2024-05-16 NOTE — TRANSFER OF CARE
"Anesthesia Transfer of Care Note    Patient: Johana Wright    Procedure(s) Performed: Procedure(s) (LRB):  RELEASE, DUPUYTREN THUMB LONG RING & SMALL FINGER (Left)  RELEASE, CONTRACTURE, JOINT THUMB LONG, RING AND SMALL FINGER (Left)    Patient location: PACU    Anesthesia Type: regional and MAC    Transport from OR: Transported from OR on 6-10 L/min O2 by face mask with adequate spontaneous ventilation    Post pain: adequate analgesia    Post assessment: no apparent anesthetic complications and tolerated procedure well    Post vital signs: stable    Level of consciousness: sedated    Nausea/Vomiting: no nausea/vomiting    Complications: none    Transfer of care protocol was followed      Last vitals: Visit Vitals  BP (!) 94/56 (BP Location: Right arm, Patient Position: Lying)   Pulse 84   Temp 36.8 °C (98.3 °F) (Tympanic)   Resp 18   Ht 5' 2" (1.575 m)   Wt 48.1 kg (106 lb)   SpO2 99%   Breastfeeding No   BMI 19.39 kg/m²     "

## 2024-05-16 NOTE — PLAN OF CARE
Discharge instructions reviewed and pt verbalizes understanding. Pain control appropriate. Dressing is clean, dry and intact. VSS and afebrile. Meds delivered to bedside. Arm sling in place. AVS complete.

## 2024-05-17 NOTE — OP NOTE
[facility])  Surgery Department  Operative Note    SUMMARY     Date of Procedure: 5/16/2024     Procedure:   1.  Left small finger partial Dupuytren's fasciectomy with PIP joint release, cpt 55716  2.  Left ring finger partial Dupuytren's fasciectomy with PIP joint release, cpt 77141  3.  Left long finger partial Dupuytren's fasciectomy with PIP joint release, cpt 40388  4.  Left thumb partial Dupuytren's fasciectomy with PIP joint release, cpt 93532    Surgeons and Role:     * Aysha Nowak MD - Primary    Assisting Surgeon: None    Pre-Operative Diagnosis: Dupuytren's contracture of left hand [M72.0]  Dupuytren's disease of palm with contracture [M72.0]    Post-Operative Diagnosis: Post-Op Diagnosis Codes:     * Dupuytren's contracture of left hand [M72.0]     * Dupuytren's disease of palm with contracture [M72.0]    Anesthesia: Regional    Indication for Procedure:  59-year-old female with longstanding left hand Dupuytren's contractures. She had failed conservative treatment. Risks and benefits of the procedure were discussed with the patient and informed consent was obtained.    Description of the Findings of the Procedure: The patient was seen in the preoperative holding area and the left hand was marked.  A regional block of the arm was performed in the preoperative holding area.  The patient was taken to the OR, placed supine on the table, and a padded hand table was used. After monitored anesthesia care was admitted without difficulty, a time-out procedure was performed identifying the patient, the operative site and the procedure to be performed.  A tourniquet was placed on the arm and the upper extremity was prepped and draped in standard sterile fashion. The upper extremity was elevated and exsanguinated with an Esmarch bandage, and the tourniquet was inflated to 250 mm Hg.     A longitudinal incision was made from the volar DIP joint to the proximal palmar crease of the left small  finger, Dimas incisions were made in the palm.  Dissection with tenotomy was used to expose the Dupuytren's cord.  This was truncated and allowed to the small finger to extend partially at the PIP joint and MP joint.  Careful dissection under loupe magnification was used to identify the neurovascular bundles to the small finger as well as the flexor tendon sheath.  These were protected throughout the case.  The Dupuytren's cord was traced distally there was a spiral band which was excised.  The diseased Dupuytren's fascia was sent for permanent pathological specimen.  A window was made in the flexor tendon sheath at the A3 pulley and the flexor tendons were retracted.  The check rein ligaments were released and the volar plate was allowed to slide distally in order to extend the PIP joint.  A PIP capsulotomy was made. The accessory collateral ligaments were released in order to allow full PIP extension.  The wound was then copiously irrigated.  Multiple Z-plasties were then created and inset in order to lengthen the volar skin.  4-0 Prolene was used to close the Z-plasty flaps in a horizontal mattress fashion    In a similar manner the left ring and long fingers were approached.  Volar Dimas incisions were made in order to expose the Dupuytren's cords which were tethering the MP joints.  Under careful dissection the Dupuytren's cords were sharply excised, protecting the neurovascular bundles.  There were natatory cords tethering the MP joints, once these were released this allowed for full extension of the long and ring fingers.  A volar Dimas incision was made over the thumb webspace.  There was a Dupuytren's cord tethering the 1st and 2nd metacarpal as well as the thumb proximal phalanx.  This cord was sharply excised in its entirety allowing the thumb webspace to fully expand.    3-0 Prolene was used to close the skin in a horizontal mattress fashion.  There was full extension of the left small, ring, long  fingers and thumb MP and PIP joints.  The tourniquet was deflated and hemostasis was achieved with bipolar electrocautery, the hand and fingers were well perfused.  All instrument, sponge and needle counts were correct.  Adaptic, 4x4, cast padding, an volar splint and coban were used to dress the hand. The patient tolerated the procedure well.  She was taken awake, alert and in good condition to the recovery room.    Complications: No    Estimated Blood Loss (EBL): minimal           Specimens: none           Condition: Good    Disposition: PACU - hemodynamically stable.    Attestation: I was present and scrubbed for the entire procedure.

## 2024-05-21 ENCOUNTER — CLINICAL SUPPORT (OUTPATIENT)
Dept: REHABILITATION | Facility: HOSPITAL | Age: 59
End: 2024-05-21
Attending: ORTHOPAEDIC SURGERY
Payer: COMMERCIAL

## 2024-05-21 DIAGNOSIS — M25.642 STIFFNESS OF LEFT HAND JOINT: ICD-10-CM

## 2024-05-21 DIAGNOSIS — M79.642 PAIN IN LEFT HAND: Primary | ICD-10-CM

## 2024-05-21 PROCEDURE — 97530 THERAPEUTIC ACTIVITIES: CPT | Performed by: OCCUPATIONAL THERAPIST

## 2024-05-21 PROCEDURE — L3913 HFO W/O JOINTS CF: HCPCS | Performed by: OCCUPATIONAL THERAPIST

## 2024-05-21 NOTE — PATIENT INSTRUCTIONS
"[facility], OCCUPATIONAL THERAPY  HOME EXERCISE PROGRAM     Use heat as needed    Complete the following massage for 3 minutes, 3x/day:                                  Using lotion and medium pressure, massage on and around scar in circles, side to side and up and down.    Complete the following exercises for 10 repetitions, 6-8x/day:       AROM: Isolated IPJ Flexion / Extension ("Hook")   Bend only your middle and end knuckles. Hold 3 seconds.   Straighten your fingers.       AROM: MCP and PIP Flexion / Extension ("Straight Fist")  Bend your bottom and middle knuckles, keeping the tips of your fingers straight.   Try to touch the pads of your fingers on your palm. Hold 3 seconds.   Straighten your fingers.       AROM: Composite Flexion / Extension ("Full Fist")  Bend every joint in your hand into a fist. Hold 3 seconds.   Straighten your fingers.     AROM: PIP (Middle Joint) Flexion / Extension  Pinch bottom knuckle  to prevent bending.   Actively bend middle knuckle until stretch is felt.   Hold 3 seconds. Relax. Straighten finger as far as possible.    AROM: DIP (Tip Joint) Flexion / Extension  Pinch middle knuckle to prevent bending.   Bend end knuckle until stretch is felt. Hold   3 seconds. Relax. Straighten finger as far as possible.      AROM: Abduction / Adduction  With hand flat on table, spread all fingers apart,   then bring them together as close as possible.  possible then straighten.     Therapist: Maggie Boasberg, OTR/CHT        "

## 2024-05-21 NOTE — PROGRESS NOTES
OCCUPATIONAL THERAPY --- ORTHOTIC EVALUATION - FITTING - TRAINING     Patient: Johana Wright  Date of Evaluation: 5/21/2024  MRN: [id number]  Diagnosis:   Encounter Diagnoses   Name Primary?    Pain in left hand Yes    Stiffness of left hand joint      Physician: Aysha Nowak MD    SUBJECTIVE:   Pt reports she underwent surgery for Dupuytren's Release on 5/16/24    OBJECTIVE:     Observations: sutures intact and incisions healing well with no signs of infection    ASSESSMENT:   Extensive time removing post op dressing. Fabricated custom extension pan orthosis , per MD orders.  Instructed in orthotic use, wear, care and precautions in detail w/ patient.  Educated pt in HEP to include TGEs and blocking exercises.    Rehab Potential: good    GOALS: to be determined on evaluation     PLAN:      Return for Evaluation for detailed assessment         Margaret Boasberg OTR/L, CHT  Occupational therapist, Certified Hand Therapist

## 2024-05-22 ENCOUNTER — CLINICAL SUPPORT (OUTPATIENT)
Dept: REHABILITATION | Facility: HOSPITAL | Age: 59
End: 2024-05-22
Payer: COMMERCIAL

## 2024-05-22 DIAGNOSIS — M79.642 HAND PAIN, LEFT: Primary | ICD-10-CM

## 2024-05-22 DIAGNOSIS — M25.642 STIFFNESS OF LEFT HAND JOINT: ICD-10-CM

## 2024-05-22 DIAGNOSIS — M25.60 RANGE OF MOTION DEFICIT: ICD-10-CM

## 2024-05-22 DIAGNOSIS — R29.898 DECREASED GRIP STRENGTH OF LEFT HAND: ICD-10-CM

## 2024-05-22 LAB
FINAL PATHOLOGIC DIAGNOSIS: NORMAL
GROSS: NORMAL
Lab: NORMAL
MICROSCOPIC EXAM: NORMAL

## 2024-05-22 PROCEDURE — 97165 OT EVAL LOW COMPLEX 30 MIN: CPT

## 2024-05-22 PROCEDURE — 97140 MANUAL THERAPY 1/> REGIONS: CPT

## 2024-05-22 PROCEDURE — 97530 THERAPEUTIC ACTIVITIES: CPT

## 2024-05-22 NOTE — PATIENT INSTRUCTIONS
"[facility] - OCCUPATIONAL THERAPY  HOME EXERCISE PROGRAM     Complete the following exercises with 10 repetitions each, 3-4x/day.     AROM: Thumb IP Flexion / Extension  Brace thumb below tip joint. Bend joint as far as possible then straighten.    AROM: Thumb Composite Flexion   Bend both joints of thumb as far as possible. Try to touch base of little finger.    AROM: Radial Adduction / Abduction  Place your palm flat on the table. Move thumb out to side. Move back alongside index finger.                                                AROM: Composite Flesion ("Pinky Slides")  Touch thumb to tip of small finger. Slide thumb down small finger into palm.       AROM: DIP Flexion / Extension  Pinch middle knuckle to prevent bending. Bend end knuckle until stretch is felt.   Hold 3 seconds. Relax. Straighten finger as far as possible.    AROM: PIP Flexion / Extension  Pinch bottom knuckle  to prevent bending. Actively bend middle knuckle until stretch is felt.     AROM: Isolated MCP Flexion / Extension ("Wave")   Bend only your large, bottom knuckles. Hold 3 seconds. Keep the tips of your fingers straight. Straighten fingers.    AROM: Isolated IPJ Flexion / Extension ("Hook")  Bend only your middle and end knuckles. Hold 3 seconds.   Straighten your fingers.     AROM: MCP and PIP Flexion / Extension ("Straight Fist")  Bend your bottom and middle knuckles, keeping the tips of your fingers straight. Try to touch the pads of your fingers on your palm. Hold 3 seconds. Straighten your fingers.     AROM: Composite Flexion / Extension ("Full Fist")  Bend every joint in your hand into a fist. Hold 3 seconds. Straighten your fingers.     AROM: Abduction / Adduction  With hand flat on table, spread all fingers apart, then bring them together as close as possible.    COLD COMPRESS OVER DRESSING FOR 5-10 MIN 1-2 X DAILY FOR PAIN, SWELLING, INFLAMATION   Therapist: SHORTY Marques, DORI        Copyright © [facility]. All " rights reserved.

## 2024-05-24 ENCOUNTER — CLINICAL SUPPORT (OUTPATIENT)
Dept: REHABILITATION | Facility: HOSPITAL | Age: 59
End: 2024-05-24
Payer: COMMERCIAL

## 2024-05-24 DIAGNOSIS — M25.642 STIFFNESS OF LEFT HAND JOINT: ICD-10-CM

## 2024-05-24 DIAGNOSIS — M79.642 HAND PAIN, LEFT: Primary | ICD-10-CM

## 2024-05-24 DIAGNOSIS — M25.60 RANGE OF MOTION DEFICIT: ICD-10-CM

## 2024-05-24 DIAGNOSIS — R29.898 DECREASED GRIP STRENGTH OF LEFT HAND: ICD-10-CM

## 2024-05-24 PROCEDURE — 97530 THERAPEUTIC ACTIVITIES: CPT

## 2024-05-24 PROCEDURE — 97140 MANUAL THERAPY 1/> REGIONS: CPT

## 2024-05-24 NOTE — PROGRESS NOTES
[facility]  Occupational Therapy Treatment Note    Date: 5/24/2024  Name: Johana Wright  Clinic Number: [id number]    Therapy Diagnosis:        Encounter Diagnoses   Name Primary?    Hand pain, left Yes    Stiffness of left hand joint      Range of motion deficit      Decreased  strength of left hand           Medical Diagnosis: L Dupuytren's fasciotomy MF, RF, SM and thumb, 5/16/24  ICD-10:  Dupuytren's contracture of left hand [M72.0]  Dupuytren's disease of palm with contracture [M72.0]     Physician: Aysha Nowak MD     Physician Orders: Specific Treatment Requested  Dupuytren's  Fasciectomy  DOS 5.16.24 Pt to be seen 3 - 5 days post-op for the following:  Fabricate a custom splint in the following positions:  A hand-based, full extension pan splint.  ()  If there is a PIP flexion contracture of 30 deg or more, fabricate a second splint to alternate in: An intrinsic plus splint with MPS in flexion to encourage full IP extension. (Fran Rosa Splint) ()  Pt to attend therapy 3 times per week for ROM, wound care, and scar management.  Suture removal by therapy at 7-10 days post op or 10-14 days for DMII. Teach Scar Massage.  Pt is to see physician at 2- 4 weeks post-op. Await further orders to continue therapy     Surgical Procedure and Date: 5/16/24,   1.  Left small finger partial Dupuytren's fasciectomy with PIP joint release, cpt 15775  2.  Left ring finger partial Dupuytren's fasciectomy with PIP joint release, cpt 90093  3.  Left long finger partial Dupuytren's fasciectomy with PIP joint release, cpt 55311  4.  Left thumb partial Dupuytren's fasciectomy with PIP joint release, cpt 48273     Evaluation Date: 5/22/2024     Plan of Care Certification Period: 8/22/24     Date of Return to MD: 6/2/24     Visit # / Visits authorized: 1 / 20  Insurance Authorization Period Expiration: 12/31/24     FOTO #1: FSM___33__ / GOAL __56___  FOTO #2:   FOTO #3:       Precautions:  Standard     Time In:1000  Time Out: 1045  Total Appointment Time (timed & untimed codes): 45 minutes      SUBJECTIVE     Pt reports: The stitches are itchy   She was compliant with home exercise program given last session.   Response to previous treatment:stiffness of small finger   Functional change: none     Pain: 4/10  Location: left hand       OBJECTIVE     Objective Measures updated at progress report unless specified.  Edema. Measured in centimeters.    5/22/2024         Proximal Wrist Crease 14.9   Proximal Palmar Crease  17   MCPs 17.2         Hand ROM. Measured in degrees.    5/22/2024               Index: MP  0/80              PIP     0/46              DIP 0/40              MARTIN              Long:  MP 0/70              PIP 0/70              DIP 0/60              MARTIN           Ring:   MP 0/68              PIP 0/56              DIP 0/55              MARTIN           Small:  MP 0/36               PIP 13/42               DIP 0/10              MARTIN           Thumb: MP 0/56                IP 0/53          Opposition To RF         Sensation intact     Special Tests:          Strength (Dyanmometer) and Pinch Strength (Pinch Gauge)  Measured in pounds and psi. Average of three trials.    5/22/2024 5/22/2024     Right  Left    Rung II TBA TBA   Key Pinch TBA TBA   3pt Pinch TBA TBA   2pt Pinch TBA TBA         Manual Muscle Testing:  SF - FDP, FDS EDM, ADM,   ODM 3-/5        CMS Impairment/Limitation/Restriction for FOTO Survey     Therapist reviewed FOTO scores for Johana Wright on 5/22/2024.   FOTO documents entered into [employer] - see Media section.     Limitation Score: ___33___%      Goal: ___56___%  Category: Self Care     Treatment     Johana received the treatments listed below:       Stitches intact - no modality this date     Manual therapy// wound care  - 25 min   - PROM of digits   - instruction in scar massage upon stitch removal   - Wound care - cleaning around wound, redressing wound  with adaptic, gauze padding and coban dressing      Therapeutic Activities to improve functional performance for 20  minutes, including:  - blocking FDP, FDS, hook, wave, flat/full fist, ab/ad   - thumb flexion, blocking FPL, thumb opposition, ab/ad   - passive small finger flexion DIP, PIP, MP  - 10 reps each, 4-5 x daily   - cold pack over dressing for pain, swelling, bruising at night   - reviewed modality use after stitches removed.     Patient Education and Home Exercises      Education provided:   - Cont HEP   -- Progress towards goals     Written Home Exercises Provided: Patient instructed to cont prior HEP.  Exercises were reviewed and Johana was able to demonstrate them prior to the end of the session.  Johana demonstrated good  understanding of the HEP provided. See EMR under Patient Instructions for exercises provided during therapy sessions.       Assessment     Johana is progressing well towards her goals and there are no updates to goals at this time. Pt prognosis is Good. Generalized stiffness remains secondary to sutures still intact.  Will remove stitches on 5/29/24 in clinic.  Will progress as tolerated.      Pt will continue to benefit from skilled outpatient occupational therapy to address the deficits listed in the problem list on initial evaluation provide pt/family education and to maximize pt's level of independence in the home and community environment.     Pt's spiritual, cultural and educational needs considered and pt agreeable to plan of care and goals.    Anticipated barriers to occupational therapy: none     The following goals were discussed with the patient and patient is in agreement with them as to be addressed in the treatment plan.      Short Term Goals: (6 weeks):  1)  Patient to be IND with HEP and modalities for pain management  2)  Increase ROM 3-10 degrees to increase functional hand use for ADLs/work/leisure activities  3)   Decrease edema .1-.5mm to increase joint mobility  /flexibility for functional hand use.   4)  Assess   and pinch and set goals accordingly      Long Term Goals :To be met by discharge (12 weeks)   1)  Increase  strength 2-8 lbs. For driving, lifting, carrying   2) Increase pinch strength 1-4 psi's for typing   3)   Increase ROM 11-20 degrees to increase functional hand use for ADLs/work/leisure activities  4) Patient to score at __50___% OR MORE (FSM)  on FOTO to demonstrate improved perception of functional left  hand  Use.     Plan   Certification Period/Plan of care expiration: 5/22/2024 to 8/22/24.       Mary Ann Wang, OT  , CHT

## 2024-05-28 NOTE — PLAN OF CARE
[facility]  Occupational Therapy Initial Evaluation     Name: Johana Wright  Clinic Number: [id number]    Therapy Diagnosis:   Encounter Diagnoses   Name Primary?    Hand pain, left Yes    Stiffness of left hand joint     Range of motion deficit     Decreased  strength of left hand        Medical Diagnosis: L Dupuytren's fasciotomy MF, RF, SM and thumb, 5/16/24  ICD-10:  Dupuytren's contracture of left hand [M72.0]  Dupuytren's disease of palm with contracture [M72.0]    Physician: Aysha Nowak MD    Physician Orders: Specific Treatment Requested  Dupuytren's  Fasciectomy  DOS 5.16.24 Pt to be seen 3 - 5 days post-op for the following:  Fabricate a custom splint in the following positions:  A hand-based, full extension pan splint.  ()  If there is a PIP flexion contracture of 30 deg or more, fabricate a second splint to alternate in: An intrinsic plus splint with MPS in flexion to encourage full IP extension. (Fran Rosa Splint) ()  Pt to attend therapy 3 times per week for ROM, wound care, and scar management.  Suture removal by therapy at 7-10 days post op or 10-14 days for DMII. Teach Scar Massage.  Pt is to see physician at 2- 4 weeks post-op. Await further orders to continue therapy    Surgical Procedure and Date: 5/16/24,   1.  Left small finger partial Dupuytren's fasciectomy with PIP joint release, cpt 30731  2.  Left ring finger partial Dupuytren's fasciectomy with PIP joint release, cpt 78513  3.  Left long finger partial Dupuytren's fasciectomy with PIP joint release, cpt 16401  4.  Left thumb partial Dupuytren's fasciectomy with PIP joint release, cpt 36774    Evaluation Date: 5/22/2024    Plan of Care Certification Period: 8/22/24    Date of Return to MD: 6/2/24    Visit # / Visits authorized: 1 / 20  Insurance Authorization Period Expiration: 12/31/24    FOTO #1: FSM___33__ / GOAL __56___  FOTO #2:   FOTO #3:     Precautions:  Standard    Time  In:1045  Time Out: 1130  Total Appointment Time (timed & untimed codes): 45 minutes    Subjective     Date of Onset: 5/16/24    History of Current Condition/Mechanism of Injury: Physician reports: 59-year-old female with longstanding left hand Dupuytren's contractures. She had failed conservative treatment. Risks and benefits of the procedure were discussed with the patient and informed consent was obtained.     Falls: none     Involved Side: left   Dominant Side: Right  Imaging: See Chart   Prior Therapy: none     Pain:  Functional Pain Scale Rating 0-10:   8/10 on average  6/10 at best  10/10 at worst  Location: left hand  Description: constant, throbbing, sore   Aggravating Factors: movement  Easing Factors: nothing    Occupation:     Working presently: employed  Duties: typing     Functional Limitations/Social History:    Previous functional status includes: Independent with all ADLs.     Current Functional Status   Home/Living environment: lives with their family           Limitation of Functional Status as follows:   ADLs/IADLs:     - Feeding: limited left hand use     - Bathing: IND     - Dressing/Grooming: IND ; limited for hair grooming     - Home Management: limited l hand use for home chores     - Driving: limited left hand use for driving     - Gripping/pinching: limited  and pinch , limited for opening      Leisure: swimming, biking, walking     Patient's Goals for Therapy: pain relief and get as much usage as possible.     Past Medical History/Physical Systems Review:   Johana Wright  has a past medical history of Abnormal Pap smear of cervix and Breast disorder.    Johana Wright  has a past surgical history that includes Tubal ligation; Breast surgery (07/2023); Sinus surgery; Laser ablation of the cervix (age 29); Hand surgery; TONSILLECTOMY, ADENOIDECTOMY; Rectal prolapse repair (04/2019); Cold knife conization of cervix (N/A, 07/09/2020); Cervical biopsy w/ loop  electrode excision; Breast surgery (Bilateral, 01/2021); Dupuytren contracture release (Left, 5/16/2024); and Release of contracture of joint (Left, 5/16/2024).    Johana has a current medication list which includes the following prescription(s): butalbital-acetaminophen-caffeine -40 mg, docusate sodium, hydrocodone-acetaminophen, ibuprofen, immun glob g(igg)/pro/iga 0-50, ondansetron, prednisone, spironolactone, tretinoin, and venlafaxine.    Review of patient's allergies indicates:   Allergen Reactions    Adhesive      Other reaction(s): rash, skin peels / can use paper tape        Objective     Observation/Appearance:  Skin intact, Skin dry, Joint stiffness, and Edema present.  Stitches intact     Wound: z- plasty to small finger and palm of RF, SF and thumb, stitches intact, no drainage.     Edema. Measured in centimeters.   5/22/2024       Proximal Wrist Crease 14.9   Proximal Palmar Crease  17   MCPs 17.2       Hand ROM. Measured in degrees.   5/22/2024           Index: MP  0/80              PIP     0/46              DIP 0/40              MARTIN          Long:  MP 0/70              PIP 0/70              DIP 0/60              MARTIN        Ring:   MP 0/68              PIP 0/56              DIP 0/55              MARTIN        Small:  MP 0/36               PIP 13/42               DIP 0/10              MARTIN        Thumb: MP 0/56                IP 0/53          Opposition To RF       Sensation intact    Special Tests:        Strength (Dyanmometer) and Pinch Strength (Pinch Gauge)  Measured in pounds and psi. Average of three trials.   5/22/2024 5/22/2024    Right  Left    Rung II TBA TBA   Key Pinch TBA TBA   3pt Pinch TBA TBA   2pt Pinch TBA TBA       Manual Muscle Testing:  SF - FDP, FDS EDM, ADM, ODM 3-/5      CMS Impairment/Limitation/Restriction for FOTO Survey    Therapist reviewed FOTO scores for Johana Wright on 5/22/2024.   FOTO documents entered into [employer] - see Media section.    Limitation Score:  ___33___%      Goal: ___56___%  Category: Self Care       Treatment     Total Treatment time separate from Evaluation time:25    Johana received the treatments listed below:      Manual therapy - 15 min   - PROM of digits   - instruction in scar massage upon stitch removal   - Wound care - cleaning around wound, redressing wound with adaptic, gauze padding and coban dressing         Therapeutic Activities to improve functional performance for 10  minutes, including:  - blocking FDP, FDS, hook, wave, flat/full fist, ab/ad   - thumb flexion, blocking FPL, thumb opposition, ab/ad   - passive small finger flexion DIP, PIP, MP  - 10 reps each, 4-5 x daily   - cold pack over dressing for pain, swelling, bruising   - reviewed modality use after stitches removed.       Patient Education and Home Exercises      Education provided:   -role of OT, goals for OT, scheduling/cancellations, insurance limitations with patient.  -Additional Education provided: YES    Written Home Exercises Provided: yes.  Exercises were reviewed and Johana was able to demonstrate them prior to the end of the session.    Johana demonstrated good  understanding of the education provided.     Pt was advised to perform these exercises free of pain, and to stop performing them if pain occurs.    See EMR under Patient Instructions for exercises provided 5/22/2024.    Assessment     Johana Wright is a 59 y.o. female referred to outpatient occupational therapy and presents with a medical diagnosis of Left dupuytren's fasciotomy of MF, RF, SF and thumb.      Assessment of Occupational Performance   Performance Deficits    Physical:  Joint Mobility  Muscle Power/Strength  Muscle Endurance  Skin Integrity/Scar Formation  Edema   Strength  Pinch Strength  Gross Motor Coordination  Fine Motor Coordination  Pain    Cognitive:  NONE     Psychosocial:    HABITS   ROUTINES      Following medical record review it is determined that pt will benefit from  occupational therapy services in order to maximize pain free and/or functional use of left hand. The following goals were discussed with the patient and patient is in agreement with them as to be addressed in the treatment plan. The patient's rehab potential is Good.     Anticipated barriers to occupational therapy: none    Plan of care discussed with patient: Yes   Patient's spiritual, cultural and educational needs considered and patient is agreeable to the plan of care and goals as stated below:     Medical Necessity is demonstrated by the following  Occupational Profile/History  Co-morbidities and personal factors that may impact the plan of care [x] LOW: Brief chart review  [] MODERATE: Expanded chart review   [] HIGH: Extensive chart review    Moderate / High Support Documentation: NONE      Examination  Performance deficits relating to physical, cognitive or psychosocial skills that result in activity limitations and/or participation restrictions  [x] LOW: addressing 1-3 Performance deficits  [] MODERATE: 3-5 Performance deficits  [] HIGH: 5+ Performance deficits (please support below)    Moderate / High Support Documentation: NONE      Treatment Options [x] LOW: Limited options  [] MODERATE: Several options  [] HIGH: Multiple options      Decision Making/ Complexity Score: Low Complexity        The following goals were discussed with the patient and patient is in agreement with them as to be addressed in the treatment plan.     Short Term Goals: (6 weeks):  1)  Patient to be IND with HEP and modalities for pain management  2)  Increase ROM 3-10 degrees to increase functional hand use for ADLs/work/leisure activities  3)   Decrease edema .1-.5mm to increase joint mobility /flexibility for functional hand use.   4)  Assess   and pinch and set goals accordingly      Long Term Goals :To be met by discharge (12 weeks)   1)  Increase  strength 2-8 lbs. For driving, lifting, carrying   2) Increase pinch  strength 1-4 psi's for typing   3)   Increase ROM 11-20 degrees to increase functional hand use for ADLs/work/leisure activities  4) Patient to score at __50___% OR MORE (FSM)  on FOTO to demonstrate improved perception of functional left  hand  Use.    Plan   Certification Period/Plan of care expiration: 5/22/2024 to 8/22/24.    Outpatient Occupational Therapy 1-2 times weekly for 6-12 weeks to include the following interventions: Paraffin, Fluidotherapy, Manual therapy/joint mobilizations, Modalities for pain management, US 3 mhz, Therapeutic exercises/activities., Strengthening, Orthotic Fabrication/Fit/Training, Edema Control, Scar Management, Wound Care, and pain management.    I certify the need for these services furnished under the plan of treatment and while under my care.     ____________________________________________________________________  Physician/Referring Practitioner   Date of Signature       Mary Ann Wang, OT, CHT

## 2024-05-29 ENCOUNTER — CLINICAL SUPPORT (OUTPATIENT)
Dept: REHABILITATION | Facility: HOSPITAL | Age: 59
End: 2024-05-29
Payer: COMMERCIAL

## 2024-05-29 DIAGNOSIS — M79.642 HAND PAIN, LEFT: Primary | ICD-10-CM

## 2024-05-29 DIAGNOSIS — M25.60 RANGE OF MOTION DEFICIT: ICD-10-CM

## 2024-05-29 DIAGNOSIS — M25.642 STIFFNESS OF LEFT HAND JOINT: ICD-10-CM

## 2024-05-29 DIAGNOSIS — R29.898 DECREASED GRIP STRENGTH OF LEFT HAND: ICD-10-CM

## 2024-05-29 PROCEDURE — 97530 THERAPEUTIC ACTIVITIES: CPT

## 2024-05-29 PROCEDURE — 97140 MANUAL THERAPY 1/> REGIONS: CPT

## 2024-05-29 NOTE — PROGRESS NOTES
[facility]  Occupational Therapy Treatment Note    Date: 5/29/2024  Name: Johana Wright  Clinic Number: [id number]    Therapy Diagnosis:        Encounter Diagnoses   Name Primary?    Hand pain, left Yes    Stiffness of left hand joint      Range of motion deficit      Decreased  strength of left hand           Medical Diagnosis: L Dupuytren's fasciotomy MF, RF, SM and thumb, 5/16/24  ICD-10:  Dupuytren's contracture of left hand [M72.0]  Dupuytren's disease of palm with contracture [M72.0]     Physician: Aysha Nowak MD     Physician Orders: Specific Treatment Requested  Dupuytren's  Fasciectomy  DOS 5.16.24 Pt to be seen 3 - 5 days post-op for the following:  Fabricate a custom splint in the following positions:  A hand-based, full extension pan splint.  ()  If there is a PIP flexion contracture of 30 deg or more, fabricate a second splint to alternate in: An intrinsic plus splint with MPS in flexion to encourage full IP extension. (Fran Rosa Splint) ()  Pt to attend therapy 3 times per week for ROM, wound care, and scar management.  Suture removal by therapy at 7-10 days post op or 10-14 days for DMII. Teach Scar Massage.  Pt is to see physician at 2- 4 weeks post-op. Await further orders to continue therapy     Surgical Procedure and Date: 5/16/24,   1.  Left small finger partial Dupuytren's fasciectomy with PIP joint release, cpt 18631  2.  Left ring finger partial Dupuytren's fasciectomy with PIP joint release, cpt 37244  3.  Left long finger partial Dupuytren's fasciectomy with PIP joint release, cpt 43072  4.  Left thumb partial Dupuytren's fasciectomy with PIP joint release, cpt 69286     Evaluation Date: 5/22/2024     Plan of Care Certification Period: 8/22/24     Date of Return to MD: 6/2/24     Visit # / Visits authorized: 1 / 20  Insurance Authorization Period Expiration: 12/31/24     FOTO #1: FSM___33__ / GOAL __56___  FOTO #2:   FOTO #3:       Precautions:  Standard     Time In:1000  Time Out: 1045  Total Appointment Time (timed & untimed codes): 45 minutes      SUBJECTIVE     Pt reports: The stitches are itchy   She was compliant with home exercise program given last session.   Response to previous treatment:stiffness of small finger   Functional change: none     Pain: 4/10  Location: left hand       OBJECTIVE     Objective Measures updated at progress report unless specified.  Edema. Measured in centimeters.    5/22/2024         Proximal Wrist Crease 14.9   Proximal Palmar Crease  17   MCPs 17.2         Hand ROM. Measured in degrees.    5/22/2024               Index: MP  0/80              PIP     0/46              DIP 0/40              MARTIN              Long:  MP 0/70              PIP 0/70              DIP 0/60              MARTIN           Ring:   MP 0/68              PIP 0/56              DIP 0/55              MARTIN           Small:  MP 0/36               PIP 13/42               DIP 0/10              MARTIN           Thumb: MP 0/56                IP 0/53          Opposition To RF         Sensation intact     Special Tests:          Strength (Dyanmometer) and Pinch Strength (Pinch Gauge)  Measured in pounds and psi. Average of three trials.    5/22/2024 5/22/2024     Right  Left    Rung II TBA TBA   Key Pinch TBA TBA   3pt Pinch TBA TBA   2pt Pinch TBA TBA         Manual Muscle Testing:  SF - FDP, FDS EDM, ADM,   ODM 3-/5        CMS Impairment/Limitation/Restriction for FOTO Survey     Therapist reviewed FOTO scores for Johana Wright on 5/22/2024.   FOTO documents entered into [employer] - see Media section.     Limitation Score: ___33___%      Goal: ___56___%  Category: Self Care     Treatment     Johana received the treatments listed below:       Stitches intact - no modality this date     Manual therapy// wound care  - 30 min   - increased time spent removing stitches this date in clinic.  - PROM of digits   -- Wound care - cleaning  wound, redressing  wound with  gauze padding and coban dressing      Therapeutic Activities to improve functional performance for 15  minutes, including:  - blocking FDP, FDS, hook, wave, flat/full fist, ab/ad   - thumb flexion, blocking FPL, thumb opposition, ab/ad   - passive small finger flexion DIP, PIP, MP  - 10 reps each, 4-5 x daily   -  Patient Education and Home Exercises      Education provided:   - Cont HEP   -- Progress towards goals     Written Home Exercises Provided: Patient instructed to cont prior HEP.  Exercises were reviewed and Johana was able to demonstrate them prior to the end of the session.  Johana demonstrated good  understanding of the HEP provided. See EMR under Patient Instructions for exercises provided during therapy sessions.       Assessment     Johana is progressing well towards her goals and there are no updates to goals at this time. Pt prognosis is Good. Generalized stiffness remains.  Stitches removed this date. Moderate adhesions noted.    Will progress as tolerated.      Pt will continue to benefit from skilled outpatient occupational therapy to address the deficits listed in the problem list on initial evaluation provide pt/family education and to maximize pt's level of independence in the home and community environment.     Pt's spiritual, cultural and educational needs considered and pt agreeable to plan of care and goals.    Anticipated barriers to occupational therapy: none     The following goals were discussed with the patient and patient is in agreement with them as to be addressed in the treatment plan.      Short Term Goals: (6 weeks):  1)  Patient to be IND with HEP and modalities for pain management  2)  Increase ROM 3-10 degrees to increase functional hand use for ADLs/work/leisure activities  3)   Decrease edema .1-.5mm to increase joint mobility /flexibility for functional hand use.   4)  Assess   and pinch and set goals accordingly      Long Term Goals :To be met by discharge  (12 weeks)   1)  Increase  strength 2-8 lbs. For driving, lifting, carrying   2) Increase pinch strength 1-4 psi's for typing   3)   Increase ROM 11-20 degrees to increase functional hand use for ADLs/work/leisure activities  4) Patient to score at __50___% OR MORE (FSM)  on FOTO to demonstrate improved perception of functional left  hand  Use.     Plan   Certification Period/Plan of care expiration: 5/22/2024 to 8/22/24.       Mary Ann Wang, OT  , CHT

## 2024-05-30 ENCOUNTER — TELEPHONE (OUTPATIENT)
Dept: ORTHOPEDICS | Facility: CLINIC | Age: 59
End: 2024-05-30
Payer: COMMERCIAL

## 2024-05-30 ENCOUNTER — CLINICAL SUPPORT (OUTPATIENT)
Dept: REHABILITATION | Facility: HOSPITAL | Age: 59
End: 2024-05-30
Payer: COMMERCIAL

## 2024-05-30 DIAGNOSIS — R29.898 DECREASED GRIP STRENGTH OF LEFT HAND: ICD-10-CM

## 2024-05-30 DIAGNOSIS — M25.642 STIFFNESS OF LEFT HAND JOINT: ICD-10-CM

## 2024-05-30 DIAGNOSIS — M25.60 RANGE OF MOTION DEFICIT: ICD-10-CM

## 2024-05-30 DIAGNOSIS — M79.642 HAND PAIN, LEFT: Primary | ICD-10-CM

## 2024-05-30 PROCEDURE — 97140 MANUAL THERAPY 1/> REGIONS: CPT

## 2024-05-30 PROCEDURE — 97018 PARAFFIN BATH THERAPY: CPT

## 2024-05-30 PROCEDURE — 97530 THERAPEUTIC ACTIVITIES: CPT

## 2024-05-30 NOTE — PROGRESS NOTES
[facility]  Occupational Therapy Treatment Note    Date: 5/30/2024  Name: Johana Wright  Clinic Number: [id number]    Therapy Diagnosis:        Encounter Diagnoses   Name Primary?    Hand pain, left Yes    Stiffness of left hand joint      Range of motion deficit      Decreased  strength of left hand           Medical Diagnosis: L Dupuytren's fasciotomy MF, RF, SM and thumb, 5/16/24  ICD-10:  Dupuytren's contracture of left hand [M72.0]  Dupuytren's disease of palm with contracture [M72.0]     Physician: Aysha Nowak MD     Physician Orders: Specific Treatment Requested  Dupuytren's  Fasciectomy  DOS 5.16.24 Pt to be seen 3 - 5 days post-op for the following:  Fabricate a custom splint in the following positions:  A hand-based, full extension pan splint.  ()  If there is a PIP flexion contracture of 30 deg or more, fabricate a second splint to alternate in: An intrinsic plus splint with MPS in flexion to encourage full IP extension. (Fran Rosa Splint) ()  Pt to attend therapy 3 times per week for ROM, wound care, and scar management.  Suture removal by therapy at 7-10 days post op or 10-14 days for DMII. Teach Scar Massage.  Pt is to see physician at 2- 4 weeks post-op. Await further orders to continue therapy     Surgical Procedure and Date: 5/16/24,   1.  Left small finger partial Dupuytren's fasciectomy with PIP joint release, cpt 78937  2.  Left ring finger partial Dupuytren's fasciectomy with PIP joint release, cpt 39206  3.  Left long finger partial Dupuytren's fasciectomy with PIP joint release, cpt 27454  4.  Left thumb partial Dupuytren's fasciectomy with PIP joint release, cpt 34772     Evaluation Date: 5/22/2024     Plan of Care Certification Period: 8/22/24     Date of Return to MD: 6/2/24     Visit # / Visits authorized: 1 / 20  Insurance Authorization Period Expiration: 12/31/24     FOTO #1: FSM___33__ / GOAL __56___  FOTO #2:   FOTO #3:       Precautions:  Standard     Time In:1000  Time Out: 1045  Total Appointment Time (timed & untimed codes): 45 minutes      SUBJECTIVE     Pt reports: The stitches are itchy   She was compliant with home exercise program given last session.   Response to previous treatment:stiffness of small finger   Functional change: none     Pain: 4/10  Location: left hand       OBJECTIVE     Objective Measures updated at progress report unless specified.  Edema. Measured in centimeters.    5/22/2024         Proximal Wrist Crease 14.9   Proximal Palmar Crease  17   MCPs 17.2         Hand ROM. Measured in degrees.    5/22/2024               Index: MP  0/80              PIP     0/46              DIP 0/40              MARTIN              Long:  MP 0/70              PIP 0/70              DIP 0/60              MARTIN           Ring:   MP 0/68              PIP 0/56              DIP 0/55              MARTIN           Small:  MP 0/36               PIP 13/42               DIP 0/10              MARTIN           Thumb: MP 0/56                IP 0/53          Opposition To RF         Sensation intact     Special Tests:          Strength (Dyanmometer) and Pinch Strength (Pinch Gauge)  Measured in pounds and psi. Average of three trials.    5/22/2024 5/22/2024     Right  Left    Rung II TBA TBA   Key Pinch TBA TBA   3pt Pinch TBA TBA   2pt Pinch TBA TBA         Manual Muscle Testing:  SF - FDP, FDS EDM, ADM,   ODM 3-/5        CMS Impairment/Limitation/Restriction for FOTO Survey     Therapist reviewed FOTO scores for Johana Wright on 5/22/2024.   FOTO documents entered into [employer] - see Media section.     Limitation Score: ___33___%      Goal: ___56___%  Category: Self Care     Treatment     Johana received the treatments listed below:       Paraffin bath x 10 min to hand to increase blood flow, circulation and tissue elasticity prior to therex.     Manual therapy// wound care  - 20 min   - debrided eschar with sharps and tweezers   - initiated  scar massage with massage stick to decrease adhesions and improve tensile glide.   - PROM of digits   -- Wound care - cleaning  wound, redressing wound with  gauze padding and coban dressing for comfort   - will progress with [facility]      Therapeutic Activities to improve functional performance for 15  minutes, including:  - blocking FDP, FDS, hook, wave, flat/full fist, ab/ad   - thumb flexion, blocking FPL, thumb opposition, ab/ad   - passive small finger flexion DIP, PIP, MP x 10 reps each   - added claudette taping this date MF-RF, then RF to SF with tendon glides for AAROM, Provided claudette strap for home use.   -  Patient Education and Home Exercises      Education provided:   - Cont HEP   -- Progress towards goals     Written Home Exercises Provided: Patient instructed to cont prior HEP.  Exercises were reviewed and Johana was able to demonstrate them prior to the end of the session.  Johana demonstrated good  understanding of the HEP provided. See EMR under Patient Instructions for exercises provided during therapy sessions.       Assessment     Johana is progressing well towards her goals and there are no updates to goals at this time. Pt prognosis is Good. Generalized stiffness remains.  Stitches removed this date. Moderate adhesions noted.    Will progress as tolerated.      Pt will continue to benefit from skilled outpatient occupational therapy to address the deficits listed in the problem list on initial evaluation provide pt/family education and to maximize pt's level of independence in the home and community environment.     Pt's spiritual, cultural and educational needs considered and pt agreeable to plan of care and goals.    Anticipated barriers to occupational therapy: none     The following goals were discussed with the patient and patient is in agreement with them as to be addressed in the treatment plan.      Short Term Goals: (6 weeks):  1)  Patient to be IND with HEP and modalities for pain  management  2)  Increase ROM 3-10 degrees to increase functional hand use for ADLs/work/leisure activities  3)   Decrease edema .1-.5mm to increase joint mobility /flexibility for functional hand use.   4)  Assess   and pinch and set goals accordingly      Long Term Goals :To be met by discharge (12 weeks)   1)  Increase  strength 2-8 lbs. For driving, lifting, carrying   2) Increase pinch strength 1-4 psi's for typing   3)   Increase ROM 11-20 degrees to increase functional hand use for ADLs/work/leisure activities  4) Patient to score at __50___% OR MORE (FSM)  on FOTO to demonstrate improved perception of functional left  hand  Use.     Plan   Certification Period/Plan of care expiration: 5/22/2024 to 8/22/24.       Mary Ann Wang, OT  , CHT

## 2024-06-04 ENCOUNTER — CLINICAL SUPPORT (OUTPATIENT)
Dept: REHABILITATION | Facility: HOSPITAL | Age: 59
End: 2024-06-04
Payer: COMMERCIAL

## 2024-06-04 DIAGNOSIS — R29.898 DECREASED GRIP STRENGTH OF LEFT HAND: ICD-10-CM

## 2024-06-04 DIAGNOSIS — M25.642 STIFFNESS OF LEFT HAND JOINT: ICD-10-CM

## 2024-06-04 DIAGNOSIS — M25.60 RANGE OF MOTION DEFICIT: ICD-10-CM

## 2024-06-04 DIAGNOSIS — M79.642 HAND PAIN, LEFT: Primary | ICD-10-CM

## 2024-06-04 PROCEDURE — 97530 THERAPEUTIC ACTIVITIES: CPT

## 2024-06-04 PROCEDURE — 97140 MANUAL THERAPY 1/> REGIONS: CPT

## 2024-06-04 PROCEDURE — 97018 PARAFFIN BATH THERAPY: CPT

## 2024-06-04 NOTE — PROGRESS NOTES
[facility]  Occupational Therapy Treatment Note    Date: 6/4/2024  Name: Johana Wright  Clinic Number: [id number]    Therapy Diagnosis:        Encounter Diagnoses   Name Primary?    Hand pain, left Yes    Stiffness of left hand joint      Range of motion deficit      Decreased  strength of left hand           Medical Diagnosis: L Dupuytren's fasciotomy MF, RF, SM and thumb, 5/16/24  ICD-10:  Dupuytren's contracture of left hand [M72.0]  Dupuytren's disease of palm with contracture [M72.0]     Physician: Aysha Nowak MD     Physician Orders: Specific Treatment Requested  Dupuytren's  Fasciectomy  DOS 5.16.24 Pt to be seen 3 - 5 days post-op for the following:  Fabricate a custom splint in the following positions:  A hand-based, full extension pan splint.  ()  If there is a PIP flexion contracture of 30 deg or more, fabricate a second splint to alternate in: An intrinsic plus splint with MPS in flexion to encourage full IP extension. (Fran Rosa Splint) ()  Pt to attend therapy 3 times per week for ROM, wound care, and scar management.  Suture removal by therapy at 7-10 days post op or 10-14 days for DMII. Teach Scar Massage.  Pt is to see physician at 2- 4 weeks post-op. Await further orders to continue therapy     Surgical Procedure and Date: 5/16/24,   1.  Left small finger partial Dupuytren's fasciectomy with PIP joint release, cpt 76034  2.  Left ring finger partial Dupuytren's fasciectomy with PIP joint release, cpt 84462  3.  Left long finger partial Dupuytren's fasciectomy with PIP joint release, cpt 75688  4.  Left thumb partial Dupuytren's fasciectomy with PIP joint release, cpt 66058     Evaluation Date: 5/22/2024     Plan of Care Certification Period: 8/22/24     Date of Return to MD: 6/2/24     Visit # / Visits authorized: 1 / 20  Insurance Authorization Period Expiration: 12/31/24     FOTO #1: FSM___33__ / GOAL __56___  FOTO #2:   FOTO #3:       Precautions:  Standard     Time In:815  Time Out: 9  Total Appointment Time (timed & untimed codes): 45 minutes      SUBJECTIVE     Pt reports: My hand is doing better, just sensitive, the small finger is really stiff  She was compliant with home exercise program given last session.   Response to previous treatment:stiffness of small finger   Functional change: none     Pain: 4/10  Location: left hand       OBJECTIVE     Objective Measures updated at progress report unless specified.    Edema. Measured in centimeters.    5/22/2024         Proximal Wrist Crease 14.9   Proximal Palmar Crease  17   MCPs 17.2         Hand ROM. Measured in degrees.    5/22/2024               Index: MP  0/80              PIP     0/46              DIP 0/40              MARTIN              Long:  MP 0/70              PIP 0/70              DIP 0/60              MARTIN           Ring:   MP 0/68              PIP 0/56              DIP 0/55              MARTIN           Small:  MP 0/36               PIP 13/42               DIP 0/10              MARTIN           Thumb: MP 0/56                IP 0/53          Opposition To RF         Sensation intact     Special Tests:          Strength (Dyanmometer) and Pinch Strength (Pinch Gauge)  Measured in pounds and psi. Average of three trials.    5/22/2024 5/22/2024     Right  Left    Rung II TBA TBA   Key Pinch TBA TBA   3pt Pinch TBA TBA   2pt Pinch TBA TBA         Manual Muscle Testing:  SF - FDP, FDS EDM, ADM,   ODM 3-/5        CMS Impairment/Limitation/Restriction for FOTO Survey     Therapist reviewed FOTO scores for Johana Wright on 5/22/2024.   FOTO documents entered into [employer] - see Media section.     Limitation Score: ___33___%      Goal: ___56___%  Category: Self Care     Treatment     Johana received the treatments listed below:       Paraffin bath x 10 min to hand to increase blood flow, circulation and tissue elasticity prior to therex.     Manual therapy// wound care  - 20 min   - scar  massage with massage stick to decrease adhesions and improve tensile glide.   - PROM of digits   -- added mepitac for scar management      Therapeutic Activities to improve functional performance for 15  minutes, including:  - blocking FDP, FDS, hook, wave, flat/full fist, ab/ad   - thumb flexion, blocking FPL, thumb opposition, ab/ad   - passive small finger flexion DIP, PIP, MP x 10 reps each   - added claudette taping this date MF-RF, then RF to SF with tendon glides for AAROM, Provided claudette strap for home use.   -  Patient Education and Home Exercises      Education provided:   - Cont HEP   -- Progress towards goals     Written Home Exercises Provided: Patient instructed to cont prior HEP.  Exercises were reviewed and Johana was able to demonstrate them prior to the end of the session.  Johana demonstrated good  understanding of the HEP provided. See EMR under Patient Instructions for exercises provided during therapy sessions.       Assessment     Johana is progressing well towards her goals and there are no updates to goals at this time. Pt prognosis is Good. Generalized stiffness remains.   Moderate adhesions noted.    Will progress as tolerated.      Pt will continue to benefit from skilled outpatient occupational therapy to address the deficits listed in the problem list on initial evaluation provide pt/family education and to maximize pt's level of independence in the home and community environment.     Pt's spiritual, cultural and educational needs considered and pt agreeable to plan of care and goals.    Anticipated barriers to occupational therapy: none     The following goals were discussed with the patient and patient is in agreement with them as to be addressed in the treatment plan.      Short Term Goals: (6 weeks):  1)  Patient to be IND with HEP and modalities for pain management  2)  Increase ROM 3-10 degrees to increase functional hand use for ADLs/work/leisure activities  3)   Decrease edema .1-.5mm  to increase joint mobility /flexibility for functional hand use.   4)  Assess   and pinch and set goals accordingly      Long Term Goals :To be met by discharge (12 weeks)   1)  Increase  strength 2-8 lbs. For driving, lifting, carrying   2) Increase pinch strength 1-4 psi's for typing   3)   Increase ROM 11-20 degrees to increase functional hand use for ADLs/work/leisure activities  4) Patient to score at __50___% OR MORE (FSM)  on FOTO to demonstrate improved perception of functional left  hand  Use.     Plan   Certification Period/Plan of care expiration: 5/22/2024 to 8/22/24.       Mary Ann Wang, OT  , CHT

## 2024-06-05 ENCOUNTER — CLINICAL SUPPORT (OUTPATIENT)
Dept: REHABILITATION | Facility: HOSPITAL | Age: 59
End: 2024-06-05
Payer: COMMERCIAL

## 2024-06-05 ENCOUNTER — OFFICE VISIT (OUTPATIENT)
Dept: ORTHOPEDICS | Facility: CLINIC | Age: 59
End: 2024-06-05
Payer: COMMERCIAL

## 2024-06-05 ENCOUNTER — PATIENT MESSAGE (OUTPATIENT)
Dept: REHABILITATION | Facility: HOSPITAL | Age: 59
End: 2024-06-05

## 2024-06-05 DIAGNOSIS — Z98.890 POST-OPERATIVE STATE: Primary | ICD-10-CM

## 2024-06-05 DIAGNOSIS — M72.0 DUPUYTREN'S CONTRACTURE OF LEFT HAND: ICD-10-CM

## 2024-06-05 DIAGNOSIS — M25.642 STIFFNESS OF LEFT HAND JOINT: ICD-10-CM

## 2024-06-05 DIAGNOSIS — M79.642 HAND PAIN, LEFT: Primary | ICD-10-CM

## 2024-06-05 DIAGNOSIS — M25.60 RANGE OF MOTION DEFICIT: ICD-10-CM

## 2024-06-05 DIAGNOSIS — R29.898 DECREASED GRIP STRENGTH OF LEFT HAND: ICD-10-CM

## 2024-06-05 PROCEDURE — 99024 POSTOP FOLLOW-UP VISIT: CPT | Mod: S$GLB,,, | Performed by: ORTHOPAEDIC SURGERY

## 2024-06-05 PROCEDURE — 3044F HG A1C LEVEL LT 7.0%: CPT | Mod: CPTII,S$GLB,, | Performed by: ORTHOPAEDIC SURGERY

## 2024-06-05 PROCEDURE — 97530 THERAPEUTIC ACTIVITIES: CPT

## 2024-06-05 PROCEDURE — 99999 PR PBB SHADOW E&M-EST. PATIENT-LVL III: CPT | Mod: PBBFAC,,, | Performed by: ORTHOPAEDIC SURGERY

## 2024-06-05 PROCEDURE — 97140 MANUAL THERAPY 1/> REGIONS: CPT

## 2024-06-05 PROCEDURE — 97018 PARAFFIN BATH THERAPY: CPT

## 2024-06-05 PROCEDURE — 1160F RVW MEDS BY RX/DR IN RCRD: CPT | Mod: CPTII,S$GLB,, | Performed by: ORTHOPAEDIC SURGERY

## 2024-06-05 PROCEDURE — 1159F MED LIST DOCD IN RCRD: CPT | Mod: CPTII,S$GLB,, | Performed by: ORTHOPAEDIC SURGERY

## 2024-06-05 RX ORDER — HYDROCODONE BITARTRATE AND ACETAMINOPHEN 5; 325 MG/1; MG/1
1 TABLET ORAL EVERY 6 HOURS PRN
Qty: 30 TABLET | Refills: 0 | Status: SHIPPED | OUTPATIENT
Start: 2024-06-05

## 2024-06-05 NOTE — PROGRESS NOTES
Johana Wright presents for post-operative evaluation of   Encounter Diagnoses   Name Primary?    Post-operative state Yes    Dupuytren's contracture of left hand    .   History of Present Illness     The patient is now 2 weeks 6 days s/p 5.16.24 left small finger + ring finger + long finger + thumb partial Dupuytren's fasciectomy with PIP joint release.  Overall the patient reports doing well.  The patient reports appropriate postoperative soreness with well controlled overall pain.    Vitals:    06/05/24 0854   PainSc:   6   PainLoc: Hand       PE:    AA&O x 4.  NAD  HEENT:  NCAT, sclera nonicteric  Lungs:  Respirations are equal and unlabored.  CV:  2+ bilateral upper and lower extremity pulses.  MSK: The incisions are well healed for the most part, a few areas along small finger appear raw, no drainage. Stiffness left small finger in flexion, full extension.  Neurovascularly intact and has 5/5 thenar and intrinsic musculature strength.    Physical Exam         Diagnostic studies and other clinical records review:  Results       No new imaging today    Assessment & Plan: Status post above, doing well     Assessment & Plan       Continue with range of motion, flexion splint to encourage passive small finger PIP motion, adjust night splint, splint wear for 4 hours in the evening and all night for the next 2 weeks; in 2 weeks can transition to to our splint wear in the evening and all night; strengthening at 6 weeks  F/U 4 weeks   Call with any questions/concerns in the interim        Aysha Nowak MD    Please be aware that this note has been generated with the assistance of [employer] voice-to-text.  Please excuse any spelling or grammatical errors.    This note was generated with the assistance of ambient listening technology. Verbal consent was obtained by the patient and accompanying visitor(s) for the recording of patient appointment to facilitate this note. I attest to having reviewed and edited the  generated note for accuracy, though some syntax or spelling errors may persist. Please contact the author of this note for any clarification.

## 2024-06-05 NOTE — PROGRESS NOTES
[facility]  Occupational Therapy Treatment Note    Date: 6/5/2024  Name: Johana Wright  Clinic Number: [id number]    Therapy Diagnosis:        Encounter Diagnoses   Name Primary?    Hand pain, left Yes    Stiffness of left hand joint      Range of motion deficit      Decreased  strength of left hand           Medical Diagnosis: L Dupuytren's fasciotomy MF, RF, SM and thumb, 5/16/24  ICD-10:  Dupuytren's contracture of left hand [M72.0]  Dupuytren's disease of palm with contracture [M72.0]     Physician: Aysha Nowak MD     Physician Orders: Specific Treatment Requested  Dupuytren's  Fasciectomy  DOS 5.16.24 Pt to be seen 3 - 5 days post-op for the following:  Fabricate a custom splint in the following positions:  A hand-based, full extension pan splint.  ()  If there is a PIP flexion contracture of 30 deg or more, fabricate a second splint to alternate in: An intrinsic plus splint with MPS in flexion to encourage full IP extension. (Fran Rosa Splint) ()  Pt to attend therapy 3 times per week for ROM, wound care, and scar management.  Suture removal by therapy at 7-10 days post op or 10-14 days for DMII. Teach Scar Massage.  Pt is to see physician at 2- 4 weeks post-op. Await further orders to continue therapy     Surgical Procedure and Date: 5/16/24,   1.  Left small finger partial Dupuytren's fasciectomy with PIP joint release, cpt 52170  2.  Left ring finger partial Dupuytren's fasciectomy with PIP joint release, cpt 90902  3.  Left long finger partial Dupuytren's fasciectomy with PIP joint release, cpt 15561  4.  Left thumb partial Dupuytren's fasciectomy with PIP joint release, cpt 98725     Evaluation Date: 5/22/2024     Plan of Care Certification Period: 8/22/24     Date of Return to MD: 6/2/24     Visit # / Visits authorized: 5 / 20  Insurance Authorization Period Expiration: 12/31/24     FOTO #1: FSM___33__ / GOAL __56___  FOTO #2:   FOTO #3:       Precautions:  Standard     Time In:745  Time Out: 830  Total Appointment Time (timed & untimed codes): 45 minutes      SUBJECTIVE     Pt reports: My hand is doing better, just sensitive, the small finger is really stiff  She was compliant with home exercise program given last session.   Response to previous treatment:stiffness of small finger   Functional change: none     Pain: 4/10  Location: left hand       OBJECTIVE     Objective Measures updated at progress report unless specified.    Edema. Measured in centimeters.    5/22/2024         Proximal Wrist Crease 14.9   Proximal Palmar Crease  17   MCPs 17.2         Hand ROM. Measured in degrees.    5/22/2024               Index: MP  0/80              PIP     0/46              DIP 0/40              MARTIN              Long:  MP 0/70              PIP 0/70              DIP 0/60              MARTIN           Ring:   MP 0/68              PIP 0/56              DIP 0/55              MARTIN           Small:  MP 0/36               PIP 13/42               DIP 0/10              MARTIN           Thumb: MP 0/56                IP 0/53          Opposition To RF         Sensation intact     Special Tests:          Strength (Dyanmometer) and Pinch Strength (Pinch Gauge)  Measured in pounds and psi. Average of three trials.    5/22/2024 5/22/2024     Right  Left    Rung II TBA TBA   Key Pinch TBA TBA   3pt Pinch TBA TBA   2pt Pinch TBA TBA         Manual Muscle Testing:  SF - FDP, FDS EDM, ADM,   ODM 3-/5        CMS Impairment/Limitation/Restriction for FOTO Survey     Therapist reviewed FOTO scores for Johana Wright on 5/22/2024.   FOTO documents entered into [employer] - see Media section.     Limitation Score: ___33___%      Goal: ___56___%  Category: Self Care     Treatment     Johana received the treatments listed below:     Paraffin bath x 10 min to hand to increase blood flow, circulation and tissue elasticity prior to therex.     Manual therapy// wound care  - 20 min   - scar  massage with massage stick to decrease adhesions and improve tensile glide.   - PROM of digits   -- added mepitac for scar management      Therapeutic Activities to improve functional performance for 15  minutes, including:  - blocking FDP, FDS, hook, wave, flat/full fist, ab/ad   - thumb flexion, blocking FPL, thumb opposition, ab/ad   - passive small finger flexion DIP, PIP, MP x 10 reps each   - added claudette taping this date MF-RF, then RF to SF with tendon glides for AAROM, Provided claudette strap for home use.   -Re-adjust web space on splint and increase MP extension to middle finger and ring finger and flexion glove for small finger.     Patient Education and Home Exercises      Education provided:   - Cont HEP   -- Progress towards goals     Written Home Exercises Provided: Patient instructed to cont prior HEP.  Exercises were reviewed and Johana was able to demonstrate them prior to the end of the session.  Johana demonstrated good  understanding of the HEP provided. See EMR under Patient Instructions for exercises provided during therapy sessions.       Assessment     Johana is progressing well towards her goals and there are no updates to goals at this time. Pt prognosis is Good. Generalized stiffness remains.   Moderate adhesions noted.    Will progress as tolerated.      Pt will continue to benefit from skilled outpatient occupational therapy to address the deficits listed in the problem list on initial evaluation provide pt/family education and to maximize pt's level of independence in the home and community environment.     Pt's spiritual, cultural and educational needs considered and pt agreeable to plan of care and goals.    Anticipated barriers to occupational therapy: none     The following goals were discussed with the patient and patient is in agreement with them as to be addressed in the treatment plan.      Short Term Goals: (6 weeks):  1)  Patient to be IND with HEP and modalities for pain  management  2)  Increase ROM 3-10 degrees to increase functional hand use for ADLs/work/leisure activities  3)   Decrease edema .1-.5mm to increase joint mobility /flexibility for functional hand use.   4)  Assess   and pinch and set goals accordingly      Long Term Goals :To be met by discharge (12 weeks)   1)  Increase  strength 2-8 lbs. For driving, lifting, carrying   2) Increase pinch strength 1-4 psi's for typing   3)   Increase ROM 11-20 degrees to increase functional hand use for ADLs/work/leisure activities  4) Patient to score at __50___% OR MORE (FSM)  on FOTO to demonstrate improved perception of functional left  hand  Use.     Plan   Certification Period/Plan of care expiration: 5/22/2024 to 8/22/24.       Mary Ann Wang, OT  , CHT

## 2024-06-07 ENCOUNTER — CLINICAL SUPPORT (OUTPATIENT)
Dept: REHABILITATION | Facility: HOSPITAL | Age: 59
End: 2024-06-07
Payer: COMMERCIAL

## 2024-06-07 DIAGNOSIS — R29.898 DECREASED GRIP STRENGTH OF LEFT HAND: ICD-10-CM

## 2024-06-07 DIAGNOSIS — M79.642 HAND PAIN, LEFT: Primary | ICD-10-CM

## 2024-06-07 DIAGNOSIS — M25.642 STIFFNESS OF LEFT HAND JOINT: ICD-10-CM

## 2024-06-07 DIAGNOSIS — M25.60 RANGE OF MOTION DEFICIT: ICD-10-CM

## 2024-06-07 PROCEDURE — 97018 PARAFFIN BATH THERAPY: CPT

## 2024-06-07 PROCEDURE — 97530 THERAPEUTIC ACTIVITIES: CPT

## 2024-06-07 PROCEDURE — 97140 MANUAL THERAPY 1/> REGIONS: CPT

## 2024-06-07 NOTE — PROGRESS NOTES
[facility]  Occupational Therapy Treatment Note    Date: 6/7/2024  Name: Johana Wright  Clinic Number: [id number]    Therapy Diagnosis:        Encounter Diagnoses   Name Primary?    Hand pain, left Yes    Stiffness of left hand joint      Range of motion deficit      Decreased  strength of left hand           Medical Diagnosis: L Dupuytren's fasciotomy MF, RF, SM and thumb, 5/16/24  ICD-10:  Dupuytren's contracture of left hand [M72.0]  Dupuytren's disease of palm with contracture [M72.0]     Physician: Aysha Nowak MD     Physician Orders: Specific Treatment Requested  Dupuytren's  Fasciectomy  DOS 5.16.24 Pt to be seen 3 - 5 days post-op for the following:  Fabricate a custom splint in the following positions:  A hand-based, full extension pan splint.  ()  If there is a PIP flexion contracture of 30 deg or more, fabricate a second splint to alternate in: An intrinsic plus splint with MPS in flexion to encourage full IP extension. (Fran Rosa Splint) ()  Pt to attend therapy 3 times per week for ROM, wound care, and scar management.  Suture removal by therapy at 7-10 days post op or 10-14 days for DMII. Teach Scar Massage.  Pt is to see physician at 2- 4 weeks post-op. Await further orders to continue therapy     Surgical Procedure and Date: 5/16/24,   1.  Left small finger partial Dupuytren's fasciectomy with PIP joint release, cpt 92942  2.  Left ring finger partial Dupuytren's fasciectomy with PIP joint release, cpt 61197  3.  Left long finger partial Dupuytren's fasciectomy with PIP joint release, cpt 74415  4.  Left thumb partial Dupuytren's fasciectomy with PIP joint release, cpt 56609     Evaluation Date: 5/22/2024     Plan of Care Certification Period: 8/22/24     Date of Return to MD: 6/2/24     Visit # / Visits authorized: 6 / 20  Insurance Authorization Period Expiration: 12/31/24     FOTO #1: FSM___33__ / GOAL __56___  FOTO #2:   FOTO #3:       Precautions:  Standard     Time In:1045  Time Out: 1130  Total Appointment Time (timed & untimed codes): 45 minutes      SUBJECTIVE     Pt reports: I like the claudette strap.  I start back at work on Monday   She was compliant with home exercise program given last session.   Response to previous treatment:continued stiffness small finger   Functional change: none     Pain: 4/10  Location: left hand       OBJECTIVE     Objective Measures updated at progress report unless specified.  Edema. Measured in centimeters.    5/22/2024         Proximal Wrist Crease 14.9   Proximal Palmar Crease  17   MCPs 17.2         Hand ROM. Measured in degrees.    5/22/2024               Index: MP  0/80              PIP     0/46              DIP 0/40              MARTIN              Long:  MP 0/70              PIP 0/70              DIP 0/60              MARTIN           Ring:   MP 0/68              PIP 0/56              DIP 0/55              MARTIN           Small:  MP 0/36               PIP 13/42               DIP 0/10              MARTIN           Thumb: MP 0/56                IP 0/53          Opposition To RF         Sensation intact     Special Tests:          Strength (Dyanmometer) and Pinch Strength (Pinch Gauge)  Measured in pounds and psi. Average of three trials.    5/22/2024 5/22/2024     Right  Left    Rung II TBA TBA   Key Pinch TBA TBA   3pt Pinch TBA TBA   2pt Pinch TBA TBA         Manual Muscle Testing:  SF - FDP, FDS EDM, ADM, ODM 3-/5        CMS Impairment/Limitation/Restriction for FOTO Survey     Therapist reviewed FOTO scores for Johana Wright on 5/22/2024.   FOTO documents entered into [employer] - see Media section.     Limitation Score: ___33___%      Goal: ___56___%  Category: Self Care     Treatment     Johana received the treatments listed below:     Supervised modalities  for 10 min after being cleared for contradictions: Paraffin bath - 10 min to left hand to increase blood flow, circulation and tissue elasticity        Manual therapy - 15 min   - scar massage with massage stick   - PROM of digits   - meptiac for scar management      Therapeutic Activities to improve functional performance for 10  minutes, including:  - blocking FDP, FDS, hook, wave, flat/full fist, ab/ad   - thumb flexion, blocking FPL, thumb opposition, ab/ad   - passive small finger flexion DIP, PIP, MP x 10 reps each   - claudette taping MF-RF, then RF - SF for AAROM  - encouraging prom small finger to improve mobility   - added flexion glove for RF/SF and adjusted rubberbands to improve tension  - cold pack over dressing for pain, swelling, bruising   - reviewed modality use after stitches removed.     Patient Education and Home Exercises      Education provided:   - Cont HEP   -flexion glove   - Progress towards goals     Written Home Exercises Provided: Patient instructed to cont prior HEP.  Exercises were reviewed and Johana was able to demonstrate them prior to the end of the session.  Johana demonstrated good  understanding of the HEP provided. See EMR under Patient Instructions for exercises provided during therapy sessions.       Assessment     Johana is progressing well towards her goals and there are no updates to goals at this time. Pt prognosis is Good. Generalized stiffness, moderate adhesions noted. ROM of small finger remains limited for flexion; maintaining good extension.     Pt will continue to benefit from skilled outpatient occupational therapy to address the deficits listed in the problem list on initial evaluation provide pt/family education and to maximize pt's level of independence in the home and community environment.     Pt's spiritual, cultural and educational needs considered and pt agreeable to plan of care and goals.    Anticipated barriers to occupational therapy: none     The following goals were discussed with the patient and patient is in agreement with them as to be addressed in the treatment plan.      Short Term Goals: (6  weeks):  1)  Patient to be IND with HEP and modalities for pain management  2)  Increase ROM 3-10 degrees to increase functional hand use for ADLs/work/leisure activities  3)   Decrease edema .1-.5mm to increase joint mobility /flexibility for functional hand use.   4)  Assess   and pinch and set goals accordingly      Long Term Goals :To be met by discharge (12 weeks)   1)  Increase  strength 2-8 lbs. For driving, lifting, carrying   2) Increase pinch strength 1-4 psi's for typing   3)   Increase ROM 11-20 degrees to increase functional hand use for ADLs/work/leisure activities  4) Patient to score at __50___% OR MORE (FSM)  on FOTO to demonstrate improved perception of functional left  hand  Use.     Plan   Certification Period/Plan of care expiration: 5/22/2024 to 8/22/24.       Mary Ann Wang, OT  , CHT

## 2024-06-10 ENCOUNTER — CLINICAL SUPPORT (OUTPATIENT)
Dept: REHABILITATION | Facility: HOSPITAL | Age: 59
End: 2024-06-10
Payer: COMMERCIAL

## 2024-06-10 DIAGNOSIS — M25.60 RANGE OF MOTION DEFICIT: ICD-10-CM

## 2024-06-10 DIAGNOSIS — R29.898 DECREASED GRIP STRENGTH OF LEFT HAND: ICD-10-CM

## 2024-06-10 DIAGNOSIS — M79.642 HAND PAIN, LEFT: Primary | ICD-10-CM

## 2024-06-10 DIAGNOSIS — M25.642 STIFFNESS OF LEFT HAND JOINT: ICD-10-CM

## 2024-06-10 PROCEDURE — 97763 ORTHC/PROSTC MGMT SBSQ ENC: CPT

## 2024-06-10 PROCEDURE — 97140 MANUAL THERAPY 1/> REGIONS: CPT

## 2024-06-10 PROCEDURE — 97530 THERAPEUTIC ACTIVITIES: CPT

## 2024-06-10 NOTE — PROGRESS NOTES
[facility]  Occupational Therapy Treatment Note    Date: 6/10/2024  Name: Johana Wright  Clinic Number: [id number]    Therapy Diagnosis:   1. Hand pain, left        2. Stiffness of left hand joint        3. Range of motion deficit        4. Decreased  strength of left hand            Medical Diagnosis: L Dupuytren's fasciotomy MF, RF, SM and thumb, 5/16/24  ICD-10:  Dupuytren's contracture of left hand [M72.0]  Dupuytren's disease of palm with contracture [M72.0]     Physician: Aysha Nowak MD     Physician Orders: Specific Treatment Requested  Dupuytren's  Fasciectomy  DOS 5.16.24 Pt to be seen 3 - 5 days post-op for the following:  Fabricate a custom splint in the following positions:  A hand-based, full extension pan splint.  ()  If there is a PIP flexion contracture of 30 deg or more, fabricate a second splint to alternate in: An intrinsic plus splint with MPS in flexion to encourage full IP extension. (Fran Rosa Splint) ()  Pt to attend therapy 3 times per week for ROM, wound care, and scar management.  Suture removal by therapy at 7-10 days post op or 10-14 days for DMII. Teach Scar Massage.  Pt is to see physician at 2- 4 weeks post-op. Await further orders to continue therapy     Surgical Procedure and Date: 5/16/24,   1.  Left small finger partial Dupuytren's fasciectomy with PIP joint release, cpt 34598  2.  Left ring finger partial Dupuytren's fasciectomy with PIP joint release, cpt 47540  3.  Left long finger partial Dupuytren's fasciectomy with PIP joint release, cpt 12847  4.  Left thumb partial Dupuytren's fasciectomy with PIP joint release, cpt 04459     Evaluation Date: 5/22/2024     Plan of Care Certification Period: 8/22/24     Date of Return to MD: 6/2/24     Visit # / Visits authorized: 1 / 20  Insurance Authorization Period Expiration: 12/31/24     FOTO #1: 33%  FOTO #2:   FOTO #3:      Precautions:  Standard     Time In:915  Time Out:  10  Total Appointment Time (timed & untimed codes): 45 minutes      SUBJECTIVE     Pt reports: I was able to wear the flexion glove 5 times over the weekend.   She was compliant with home exercise program given last session.   Response to previous treatment:Increased ability to participate in HEP  Functional change: More supple scar tissue noted    Pain: 4/10  Location: left hand       OBJECTIVE     Objective Measures updated at progress report unless specified.    Treatment     Johana received the treatments listed below:     Supervised modalities  for 8 min after being cleared for contradictions: Paraffin bath -    Manual therapy - 15 min   - PROM of digits with joint mobilizations grade I      Therapeutic Activities to improve functional performance for 10  minutes, including:  - blocking FDP, FDS, hook, wave, flat/full fist, ab/ad   - thumb flexion, blocking FPL, thumb opposition, ab/ad   - passive small finger flexion DIP, PIP, MP  - 10 reps each, 4-5 x daily     Orthotic Fit and Train x 15 min   - adjusted orthotic to allow increased web space and improved MP extension    Patient Education and Home Exercises      Education provided:   - Cont HEP   -Tissue healing timeline  - Progress towards goals     Written Home Exercises Provided: Patient instructed to cont prior HEP.  Exercises were reviewed and Johana was able to demonstrate them prior to the end of the session.  Johana demonstrated good  understanding of the HEP provided. See EMR under Patient Instructions for exercises provided during therapy sessions.       Assessment     Johana is progressing well towards her goals and there are no updates to goals at this time. Pt prognosis is Good.     Pt will continue to benefit from skilled outpatient occupational therapy to address the deficits listed in the problem list on initial evaluation provide pt/family education and to maximize pt's level of independence in the home and community environment.     Pt's  spiritual, cultural and educational needs considered and pt agreeable to plan of care and goals.    Anticipated barriers to occupational therapy: none     The following goals were discussed with the patient and patient is in agreement with them as to be addressed in the treatment plan.      Short Term Goals: (6 weeks):  1)  Patient to be IND with HEP and modalities for pain management  2)  Increase ROM 3-10 degrees to increase functional hand use for ADLs/work/leisure activities  3)   Decrease edema .1-.5mm to increase joint mobility /flexibility for functional hand use.   4)  Assess   and pinch and set goals accordingly      Long Term Goals :To be met by discharge (12 weeks)   1)  Increase  strength 2-8 lbs. For driving, lifting, carrying   2) Increase pinch strength 1-4 psi's for typing   3)   Increase ROM 11-20 degrees to increase functional hand use for ADLs/work/leisure activities  4) Patient to score at __50___% OR MORE (FSM)  on FOTO to demonstrate improved perception of functional left  hand  Use.     Plan   Certification Period/Plan of care expiration: 5/22/2024 to 8/22/24.       Petra Hinojosa, OT

## 2024-06-12 ENCOUNTER — CLINICAL SUPPORT (OUTPATIENT)
Dept: REHABILITATION | Facility: HOSPITAL | Age: 59
End: 2024-06-12
Payer: COMMERCIAL

## 2024-06-12 DIAGNOSIS — M25.642 STIFFNESS OF LEFT HAND JOINT: ICD-10-CM

## 2024-06-12 DIAGNOSIS — M79.642 HAND PAIN, LEFT: Primary | ICD-10-CM

## 2024-06-12 DIAGNOSIS — M25.60 RANGE OF MOTION DEFICIT: ICD-10-CM

## 2024-06-12 DIAGNOSIS — R29.898 DECREASED GRIP STRENGTH OF LEFT HAND: ICD-10-CM

## 2024-06-12 PROCEDURE — 97018 PARAFFIN BATH THERAPY: CPT

## 2024-06-12 PROCEDURE — 97530 THERAPEUTIC ACTIVITIES: CPT

## 2024-06-12 PROCEDURE — 97140 MANUAL THERAPY 1/> REGIONS: CPT

## 2024-06-12 NOTE — PROGRESS NOTES
[facility]  Occupational Therapy Treatment Note    Date: 6/12/2024  Name: Johana Wright  Clinic Number: [id number]    Therapy Diagnosis:   1. Hand pain, left        2. Stiffness of left hand joint        3. Range of motion deficit        4. Decreased  strength of left hand              Medical Diagnosis: L Dupuytren's fasciotomy MF, RF, SM and thumb, 5/16/24  ICD-10:  Dupuytren's contracture of left hand [M72.0]  Dupuytren's disease of palm with contracture [M72.0]     Physician: Aysha Nowak MD     Physician Orders: Specific Treatment Requested  Dupuytren's  Fasciectomy  DOS 5.16.24 Pt to be seen 3 - 5 days post-op for the following:  Fabricate a custom splint in the following positions:  A hand-based, full extension pan splint.  ()  If there is a PIP flexion contracture of 30 deg or more, fabricate a second splint to alternate in: An intrinsic plus splint with MPS in flexion to encourage full IP extension. (Fran Rosa Splint) ()  Pt to attend therapy 3 times per week for ROM, wound care, and scar management.  Suture removal by therapy at 7-10 days post op or 10-14 days for DMII. Teach Scar Massage.  Pt is to see physician at 2- 4 weeks post-op. Await further orders to continue therapy     Surgical Procedure and Date: 5/16/24,   1.  Left small finger partial Dupuytren's fasciectomy with PIP joint release, cpt 99692  2.  Left ring finger partial Dupuytren's fasciectomy with PIP joint release, cpt 95557  3.  Left long finger partial Dupuytren's fasciectomy with PIP joint release, cpt 72969  4.  Left thumb partial Dupuytren's fasciectomy with PIP joint release, cpt 01662     Evaluation Date: 5/22/2024     Plan of Care Certification Period: 8/22/24     Date of Return to MD: 6/2/24     Visit # / Visits authorized: 8/ 20  Insurance Authorization Period Expiration: 12/31/24     FOTO #1: 33% Eval   FOTO #2:   FOTO #3:      Precautions:  Standard     Time In: 345  Time  Out: 430  Total Appointment Time (timed & untimed codes): 45 minutes      SUBJECTIVE     Pt reports: I was able to wear the flexion glove 5 times over the weekend.   She was compliant with home exercise program given last session.  I forgot to bring my spint in today (MD requesting MP hyperextension adjustment to night orthosis to stretch palmar fascia)   Response to previous treatment:Increased ability to participate in HEP  Functional change: More supple scar tissue noted    Pain: 4/10  Location: left hand       OBJECTIVE     Objective Measures updated at progress report unless specified.  Edema. Measured in centimeters.    5/22/2024         Proximal Wrist Crease 14.9   Proximal Palmar Crease  17   MCPs 17.2         Hand ROM. Measured in degrees.    5/22/2024               Index: MP  0/80              PIP     0/46              DIP 0/40              MARTIN              Long:  MP 0/70              PIP 0/70              DIP 0/60              MARTIN           Ring:   MP 0/68              PIP 0/56              DIP 0/55              MARTIN           Small:  MP 0/36               PIP 13/42               DIP 0/10              MARTIN           Thumb: MP 0/56                IP 0/53          Opposition To RF           Strength (Dyanmometer) and Pinch Strength (Pinch Gauge)  Measured in pounds and psi. Average of three trials.    5/22/2024 5/22/2024     Right  Left    Rung II TBA TBA   Key Pinch TBA TBA   3pt Pinch TBA TBA   2pt Pinch TBA TBA         Treatment     Johana received the treatments listed below:     Supervised modalities  for 10 min after being cleared for contradictions: Paraffin bath - to increase blood flow, circulation, tissue elasticity and for pain/scar management.    Manual therapy - 15 min   - PROM of digits with joint mobilizations grade I to digits and PROM to improve joint mobility and flexibility   - scar massage and cupping to decrease adhesions and improve tensile glide      Therapeutic Activities to improve  functional performance for 20  minutes, including:  - blocking FDP, FDS, hook, wave, flat/full fist, ab/ad   - thumb flexion, blocking FPL, thumb opposition, ab/ad   - passive small finger flexion DIP, PIP, MP  - added isolated FDS glide   - claudette taping MF - RF and RF to SF with tendon glides to promote AAROM   - flexion glove for home use, adjusted rubberband tension on ring/small finger, 3-5 x daily x 15-30 min following MH application as tolerated.   Patient Education and Home Exercises      Education provided:   - Cont HEP   -Tissue healing timeline  - Progress towards goals     Written Home Exercises Provided: Patient instructed to cont prior HEP.  Exercises were reviewed and Johana was able to demonstrate them prior to the end of the session.  Johana demonstrated good  understanding of the HEP provided. See EMR under Patient Instructions for exercises provided during therapy sessions.       Assessment     Johana is progressing well towards her goals and there are no updates to goals at this time. Pt prognosis is Good. Small finger stiffness remains most limited for flexion; maintaining good extension; will adjust orthosis to improve MP hyperextension per MD request.     Pt will continue to benefit from skilled outpatient occupational therapy to address the deficits listed in the problem list on initial evaluation provide pt/family education and to maximize pt's level of independence in the home and community environment.     Pt's spiritual, cultural and educational needs considered and pt agreeable to plan of care and goals.    Anticipated barriers to occupational therapy: none     The following goals were discussed with the patient and patient is in agreement with them as to be addressed in the treatment plan.      Short Term Goals: (6 weeks):  1)  Patient to be IND with HEP and modalities for pain management  2)  Increase ROM 3-10 degrees to increase functional hand use for ADLs/work/leisure activities  3)    Decrease edema .1-.5mm to increase joint mobility /flexibility for functional hand use.   4)  Assess   and pinch and set goals accordingly      Long Term Goals :To be met by discharge (12 weeks)   1)  Increase  strength 2-8 lbs. For driving, lifting, carrying   2) Increase pinch strength 1-4 psi's for typing   3)   Increase ROM 11-20 degrees to increase functional hand use for ADLs/work/leisure activities  4) Patient to score at __50___% OR MORE (FSM)  on FOTO to demonstrate improved perception of functional left  hand  Use.     Plan   Certification Period/Plan of care expiration: 5/22/2024 to 8/22/24.       Mary Ann Wang, KIM  , CHT

## 2024-06-13 ENCOUNTER — PATIENT MESSAGE (OUTPATIENT)
Dept: REHABILITATION | Facility: HOSPITAL | Age: 59
End: 2024-06-13
Payer: COMMERCIAL

## 2024-06-17 ENCOUNTER — TELEPHONE (OUTPATIENT)
Dept: ORTHOPEDICS | Facility: CLINIC | Age: 59
End: 2024-06-17
Payer: COMMERCIAL

## 2024-06-17 NOTE — TELEPHONE ENCOUNTER
Moved pt to 6/26/ for 1:45pm----- Message from Virginia Obrien sent at 6/17/2024 10:30 AM CDT -----  Name of Who is Calling:BRANDIN STEELE [[id number]]                What is the request in detail: Pt calling because she have post op schedule for 6/24 but she would like to reschedule her post op to 6/26 at [facility] location and would like to reschedule.Please call back to further assist.                 Can the clinic reply by [facility]: No                What Number to Call Back [facility] not [facility]: [phone]

## 2024-06-19 ENCOUNTER — CLINICAL SUPPORT (OUTPATIENT)
Dept: REHABILITATION | Facility: HOSPITAL | Age: 59
End: 2024-06-19
Payer: COMMERCIAL

## 2024-06-19 ENCOUNTER — PATIENT MESSAGE (OUTPATIENT)
Dept: ORTHOPEDICS | Facility: CLINIC | Age: 59
End: 2024-06-19
Payer: COMMERCIAL

## 2024-06-19 DIAGNOSIS — M25.642 STIFFNESS OF LEFT HAND JOINT: ICD-10-CM

## 2024-06-19 DIAGNOSIS — M79.642 HAND PAIN, LEFT: Primary | ICD-10-CM

## 2024-06-19 DIAGNOSIS — M25.60 RANGE OF MOTION DEFICIT: ICD-10-CM

## 2024-06-19 DIAGNOSIS — R29.898 DECREASED GRIP STRENGTH OF LEFT HAND: ICD-10-CM

## 2024-06-19 PROCEDURE — 97140 MANUAL THERAPY 1/> REGIONS: CPT

## 2024-06-19 PROCEDURE — 97530 THERAPEUTIC ACTIVITIES: CPT

## 2024-06-19 NOTE — PROGRESS NOTES
[facility]  Occupational Therapy Treatment Note    Date: 6/19/2024  Name: Johana Wright  Clinic Number: [id number]    Therapy Diagnosis:   1. Hand pain, left        2. Stiffness of left hand joint        3. Range of motion deficit        4. Decreased  strength of left hand              Medical Diagnosis: L Dupuytren's fasciotomy MF, RF, SM and thumb, 5/16/24  ICD-10:  Dupuytren's contracture of left hand [M72.0]  Dupuytren's disease of palm with contracture [M72.0]     Physician: Aysha Nowak MD     Physician Orders: Specific Treatment Requested  Dupuytren's  Fasciectomy  DOS 5.16.24 Pt to be seen 3 - 5 days post-op for the following:  Fabricate a custom splint in the following positions:  A hand-based, full extension pan splint.  ()  If there is a PIP flexion contracture of 30 deg or more, fabricate a second splint to alternate in: An intrinsic plus splint with MPS in flexion to encourage full IP extension. (Fran Rosa Splint) ()  Pt to attend therapy 3 times per week for ROM, wound care, and scar management.  Suture removal by therapy at 7-10 days post op or 10-14 days for DMII. Teach Scar Massage.  Pt is to see physician at 2- 4 weeks post-op. Await further orders to continue therapy     Surgical Procedure and Date: 5/16/24,   1.  Left small finger partial Dupuytren's fasciectomy with PIP joint release, cpt 03126  2.  Left ring finger partial Dupuytren's fasciectomy with PIP joint release, cpt 47757  3.  Left long finger partial Dupuytren's fasciectomy with PIP joint release, cpt 63727  4.  Left thumb partial Dupuytren's fasciectomy with PIP joint release, cpt 58061     Evaluation Date: 5/22/2024     Plan of Care Certification Period: 8/22/24     Date of Return to MD: 6/2/24     Visit # / Visits authorized: 6 / 20  Insurance Authorization Period Expiration: 12/31/24     FOTO #1: 33%  FOTO #2:   FOTO #3:      Precautions:  Standard     Time In:915  Time Out:  10  Total Appointment Time (timed & untimed codes): 45 minutes      SUBJECTIVE     Pt reports: I was swollen after last time.   She was compliant with home exercise program given last session.   Response to previous treatment:Increased ability to participate in HEP  Functional change: More supple scar tissue noted    Pain: 4/10  Location: left hand       OBJECTIVE     Objective Measures updated at progress report unless specified.    Treatment     Johana received the treatments listed below:     Supervised modalities  for 8 min after being cleared for contradictions: Paraffin bath -    Manual therapy - 25 min   - PROM of digits with joint mobilizations grade I coupled with PROM and place and hold      Therapeutic Activities to improve functional performance for 10  minutes, including:  - blocking FDP, FDS, hook, wave, flat/full fist, ab/ad   - thumb flexion, blocking FPL, thumb opposition, ab/ad   - passive small finger flexion DIP, PIP, MP  - 10 reps each, 4-5 x daily         Patient Education and Home Exercises      Education provided:   - Cont HEP   -Tissue healing timeline  - Progress towards goals     Written Home Exercises Provided: Patient instructed to cont prior HEP.  Exercises were reviewed and Johana was able to demonstrate them prior to the end of the session.  Johana demonstrated good  understanding of the HEP provided. See EMR under Patient Instructions for exercises provided during therapy sessions.       Assessment     Johana is progressing well towards her goals and there are no updates to goals at this time. Pt prognosis is Good.    Pt will continue to benefit from skilled outpatient occupational therapy to address the deficits listed in the problem list on initial evaluation provide pt/family education and to maximize pt's level of independence in the home and community environment.     Pt's spiritual, cultural and educational needs considered and pt agreeable to plan of care and goals.    Anticipated  barriers to occupational therapy: none     The following goals were discussed with the patient and patient is in agreement with them as to be addressed in the treatment plan.      Short Term Goals: (6 weeks):  1)  Patient to be IND with HEP and modalities for pain management  2)  Increase ROM 3-10 degrees to increase functional hand use for ADLs/work/leisure activities  3)   Decrease edema .1-.5mm to increase joint mobility /flexibility for functional hand use.   4)  Assess   and pinch and set goals accordingly      Long Term Goals :To be met by discharge (12 weeks)   1)  Increase  strength 2-8 lbs. For driving, lifting, carrying   2) Increase pinch strength 1-4 psi's for typing   3)   Increase ROM 11-20 degrees to increase functional hand use for ADLs/work/leisure activities  4) Patient to score at __50___% OR MORE (FSM)  on FOTO to demonstrate improved perception of functional left  hand  Use.     Plan   Certification Period/Plan of care expiration: 5/22/2024 to 8/22/24.       Petra Hinojosa, OT

## 2024-06-21 ENCOUNTER — CLINICAL SUPPORT (OUTPATIENT)
Dept: REHABILITATION | Facility: HOSPITAL | Age: 59
End: 2024-06-21
Payer: COMMERCIAL

## 2024-06-21 DIAGNOSIS — M25.642 STIFFNESS OF LEFT HAND JOINT: ICD-10-CM

## 2024-06-21 DIAGNOSIS — M79.642 HAND PAIN, LEFT: Primary | ICD-10-CM

## 2024-06-21 DIAGNOSIS — M25.60 RANGE OF MOTION DEFICIT: ICD-10-CM

## 2024-06-21 DIAGNOSIS — R29.898 DECREASED GRIP STRENGTH OF LEFT HAND: ICD-10-CM

## 2024-06-21 PROCEDURE — 97140 MANUAL THERAPY 1/> REGIONS: CPT

## 2024-06-21 PROCEDURE — 97530 THERAPEUTIC ACTIVITIES: CPT

## 2024-06-21 PROCEDURE — 97018 PARAFFIN BATH THERAPY: CPT

## 2024-06-26 ENCOUNTER — OFFICE VISIT (OUTPATIENT)
Dept: ORTHOPEDICS | Facility: CLINIC | Age: 59
End: 2024-06-26
Payer: COMMERCIAL

## 2024-06-26 ENCOUNTER — TELEPHONE (OUTPATIENT)
Dept: ORTHOPEDICS | Facility: CLINIC | Age: 59
End: 2024-06-26

## 2024-06-26 DIAGNOSIS — M72.0 DUPUYTREN'S CONTRACTURE OF LEFT HAND: Primary | ICD-10-CM

## 2024-06-26 PROCEDURE — 99024 POSTOP FOLLOW-UP VISIT: CPT | Mod: S$GLB,,, | Performed by: ORTHOPAEDIC SURGERY

## 2024-06-26 PROCEDURE — 99999 PR PBB SHADOW E&M-EST. PATIENT-LVL III: CPT | Mod: PBBFAC,,, | Performed by: ORTHOPAEDIC SURGERY

## 2024-06-26 PROCEDURE — 3044F HG A1C LEVEL LT 7.0%: CPT | Mod: CPTII,S$GLB,, | Performed by: ORTHOPAEDIC SURGERY

## 2024-06-26 PROCEDURE — 1159F MED LIST DOCD IN RCRD: CPT | Mod: CPTII,S$GLB,, | Performed by: ORTHOPAEDIC SURGERY

## 2024-06-26 PROCEDURE — 1160F RVW MEDS BY RX/DR IN RCRD: CPT | Mod: CPTII,S$GLB,, | Performed by: ORTHOPAEDIC SURGERY

## 2024-06-26 NOTE — TELEPHONE ENCOUNTER
----- Message from Armin Duenas sent at 6/26/2024  3:54 PM CDT -----  Regarding: Self [phone]  Type: RX Refill Request    Who Called:  Self     Have you contacted your pharmacy: no     Refill    RX Name and Strength: HYDROcodone-acetaminophen (NORCO) 5-325 mg per tablet    Preferred Pharmacy with phone number:   [id number] - [place] - [address] AT [facility] [place] [place] [address]  [address] [address]  Phone: [phone] Fax: [phone]       Local or Mail Order: local     Would the patient rather a call back or a response via [facility]? Call back     Best Call Back Number: [phone]     Additional Information:     Thank you.

## 2024-06-26 NOTE — PROGRESS NOTES
Johana Wright presents for post-operative evaluation of   Encounter Diagnosis   Name Primary?    Dupuytren's contracture of left hand Yes   The patient is now almost 6 weeks s/p left small finger, ring finger, long finger and thumb partial Dupuytren's fasciectomy with PIP and MP joint releases.  Overall the patient reports doing well.  She has been working diligently in therapy and wearing her flexion splint.    She would like to think about right hand xiaflex injection in the near future.  The right long finger flexion is starting to impede her hand function.  She had previous Xiaflex injection there as well as surgical intervention in the past.  Vitals:    06/26/24 1348   PainSc:   3       PE:    AA&O x 4.  NAD  HEENT:  NCAT, sclera nonicteric  Lungs:  Respirations are equal and unlabored.  CV:  2+ bilateral upper and lower extremity pulses.  MSK: The incisions are well healed.  Near full extension of the left small ring and long fingers, stiffness with flexion of the fingers.  Neurovascularly intact and has 5/5 thenar and intrinsic musculature strength.      Right long finger Dupuytren's cord 45 degree MP joint contracture 50 degree PIP contracture.    Diagnostic studies and other clinical records review:      Assessment & Plan: Status post above, doing well    Continue with range of motion and strengthening; she will need 2 vials of Xiaflex for the right hand.  We will plan for this after her next follow up at 6 weeks.  F/U 6 weeks  Call with any questions/concerns in the interim        Aysha Nowak MD    Please be aware that this note has been generated with the assistance of [employer] voice-to-text.  Please excuse any spelling or grammatical errors.    This note was generated with the assistance of ambient listening technology. Verbal consent was obtained by the patient and accompanying visitor(s) for the recording of patient appointment to facilitate this note. I attest to having reviewed and edited  the generated note for accuracy, though some syntax or spelling errors may persist. Please contact the author of this note for any clarification.

## 2024-06-28 ENCOUNTER — CLINICAL SUPPORT (OUTPATIENT)
Dept: REHABILITATION | Facility: HOSPITAL | Age: 59
End: 2024-06-28
Payer: COMMERCIAL

## 2024-06-28 DIAGNOSIS — M25.642 STIFFNESS OF LEFT HAND JOINT: ICD-10-CM

## 2024-06-28 DIAGNOSIS — M79.642 HAND PAIN, LEFT: Primary | ICD-10-CM

## 2024-06-28 DIAGNOSIS — M25.60 RANGE OF MOTION DEFICIT: ICD-10-CM

## 2024-06-28 DIAGNOSIS — R29.898 DECREASED GRIP STRENGTH OF LEFT HAND: ICD-10-CM

## 2024-06-28 PROCEDURE — 97140 MANUAL THERAPY 1/> REGIONS: CPT

## 2024-06-28 PROCEDURE — 97018 PARAFFIN BATH THERAPY: CPT

## 2024-06-28 PROCEDURE — 97530 THERAPEUTIC ACTIVITIES: CPT

## 2024-06-28 NOTE — PROGRESS NOTES
[facility]  Occupational Therapy Treatment Note    Date: 6/28/2024  Name: Johana Wright  Clinic Number: [id number]    Therapy Diagnosis:   1. Hand pain, left        2. Stiffness of left hand joint        3. Range of motion deficit        4. Decreased  strength of left hand              Medical Diagnosis: L Dupuytren's fasciotomy MF, RF, SM and thumb, 5/16/24  ICD-10:  Dupuytren's contracture of left hand [M72.0]  Dupuytren's disease of palm with contracture [M72.0]     Physician: Aysha Nowak MD     Physician Orders: Specific Treatment Requested  Dupuytren's  Fasciectomy  DOS 5.16.24 Pt to be seen 3 - 5 days post-op for the following:  Fabricate a custom splint in the following positions:  A hand-based, full extension pan splint.  ()  If there is a PIP flexion contracture of 30 deg or more, fabricate a second splint to alternate in: An intrinsic plus splint with MPS in flexion to encourage full IP extension. (Fran Rosa Splint) ()  Pt to attend therapy 3 times per week for ROM, wound care, and scar management.  Suture removal by therapy at 7-10 days post op or 10-14 days for DMII. Teach Scar Massage.  Pt is to see physician at 2- 4 weeks post-op. Await further orders to continue therapy     Surgical Procedure and Date: 5/16/24,   1.  Left small finger partial Dupuytren's fasciectomy with PIP joint release, cpt 69897  2.  Left ring finger partial Dupuytren's fasciectomy with PIP joint release, cpt 83257  3.  Left long finger partial Dupuytren's fasciectomy with PIP joint release, cpt 96651  4.  Left thumb partial Dupuytren's fasciectomy with PIP joint release, cpt 39200     Evaluation Date: 5/22/2024     Plan of Care Certification Period: 8/22/24     Date of Return to MD: 6/2/24     Visit # / Visits authorized: 8 / 20  Insurance Authorization Period Expiration: 12/31/24     FOTO #1: 33%  FOTO #2:   FOTO #3:      Precautions:  Standard     Time In:915  Time Out:  10  Total Appointment Time (timed & untimed codes): 45 minutes      SUBJECTIVE     Pt reports: I was swollen after last time but I am using my coban and Dr Nowak said it would be difficult and it is!    She was compliant with home exercise program given last session.   Response to previous treatment:Increased ability to participate in HEP  Functional change: More supple scar tissue noted    Pain: 4/10  Location: left hand       OBJECTIVE     Objective Measures updated at progress report unless specified.    Treatment     Johana received the treatments listed below:     Supervised modalities  for 8 min after being cleared for contradictions: Paraffin bath -    Manual therapy - 25 min   - PROM of digits with joint mobilizations grade I coupled with PROM and place and hold      Therapeutic Activities to improve functional performance for 10  minutes, including:  - blocking FDP, FDS, hook, wave, flat/full fist, ab/ad   - thumb flexion, blocking FPL, thumb opposition, ab/ad   - passive small finger flexion DIP, PIP, MP  - 10 reps each, 4-5 x daily         Patient Education and Home Exercises      Education provided:   - Cont HEP   -Tissue healing timeline  - Progress towards goals     Written Home Exercises Provided: Patient instructed to cont prior HEP.  Exercises were reviewed and Johana was able to demonstrate them prior to the end of the session.  Johana demonstrated good  understanding of the HEP provided. See EMR under Patient Instructions for exercises provided during therapy sessions.       Assessment     Johana is progressing well towards her goals and there are no updates to goals at this time. Pt prognosis is Good, she remains overally stiff both intrinsically and extrinsically     Pt will continue to benefit from skilled outpatient occupational therapy to address the deficits listed in the problem list on initial evaluation provide pt/family education and to maximize pt's level of independence in the home and  community environment.     Pt's spiritual, cultural and educational needs considered and pt agreeable to plan of care and goals.    Anticipated barriers to occupational therapy: none     The following goals were discussed with the patient and patient is in agreement with them as to be addressed in the treatment plan.      Short Term Goals: (6 weeks):  1)  Patient to be IND with HEP and modalities for pain management  2)  Increase ROM 3-10 degrees to increase functional hand use for ADLs/work/leisure activities  3)   Decrease edema .1-.5mm to increase joint mobility /flexibility for functional hand use.   4)  Assess   and pinch and set goals accordingly      Long Term Goals :To be met by discharge (12 weeks)   1)  Increase  strength 2-8 lbs. For driving, lifting, carrying   2) Increase pinch strength 1-4 psi's for typing   3)   Increase ROM 11-20 degrees to increase functional hand use for ADLs/work/leisure activities  4) Patient to score at __50___% OR MORE (FSM)  on FOTO to demonstrate improved perception of functional left  hand  Use.     Plan   Certification Period/Plan of care expiration: 5/22/2024 to 8/22/24.       Petra Hinojosa, OT

## 2024-07-01 ENCOUNTER — APPOINTMENT (OUTPATIENT)
Dept: REHABILITATION | Facility: HOSPITAL | Age: 59
End: 2024-07-01
Payer: COMMERCIAL

## 2024-07-01 DIAGNOSIS — Z98.890 POST-OPERATIVE STATE: ICD-10-CM

## 2024-07-01 DIAGNOSIS — M72.0 DUPUYTREN'S CONTRACTURE OF LEFT HAND: ICD-10-CM

## 2024-07-01 NOTE — TELEPHONE ENCOUNTER
6wk postop   5.16.24 left small finger + ring finger + long finger + thumb partial Dupuytren's fasciectomy with PIP joint release    Increased pain 8/10 with OT, dose prior to OT to assist with pain    HYDROcodone-acetaminophen (NORCO) 5-325 mg per tablet  refilled 6.5.24 20 tablets

## 2024-07-02 RX ORDER — HYDROCODONE BITARTRATE AND ACETAMINOPHEN 5; 325 MG/1; MG/1
1 TABLET ORAL EVERY 8 HOURS PRN
Qty: 20 TABLET | Refills: 0 | Status: SHIPPED | OUTPATIENT
Start: 2024-07-02

## 2024-07-02 NOTE — PROGRESS NOTES
[facility]  Occupational Therapy Treatment Note    Date: 6/21/2024  Name: Johana Wright  Clinic Number: [id number]    Therapy Diagnosis:   1. Hand pain, left        2. Stiffness of left hand joint        3. Range of motion deficit        4. Decreased  strength of left hand              Medical Diagnosis: L Dupuytren's fasciotomy MF, RF, SM and thumb, 5/16/24  ICD-10:  Dupuytren's contracture of left hand [M72.0]  Dupuytren's disease of palm with contracture [M72.0]     Physician: Aysha Nowak MD     Physician Orders: Specific Treatment Requested  Dupuytren's  Fasciectomy  DOS 5.16.24 Pt to be seen 3 - 5 days post-op for the following:  Fabricate a custom splint in the following positions:  A hand-based, full extension pan splint.  ()  If there is a PIP flexion contracture of 30 deg or more, fabricate a second splint to alternate in: An intrinsic plus splint with MPS in flexion to encourage full IP extension. (Fran Rosa Splint) ()  Pt to attend therapy 3 times per week for ROM, wound care, and scar management.  Suture removal by therapy at 7-10 days post op or 10-14 days for DMII. Teach Scar Massage.  Pt is to see physician at 2- 4 weeks post-op. Await further orders to continue therapy     Surgical Procedure and Date: 5/16/24,   1.  Left small finger partial Dupuytren's fasciectomy with PIP joint release, cpt 29589  2.  Left ring finger partial Dupuytren's fasciectomy with PIP joint release, cpt 86730  3.  Left long finger partial Dupuytren's fasciectomy with PIP joint release, cpt 22321  4.  Left thumb partial Dupuytren's fasciectomy with PIP joint release, cpt 90666     Evaluation Date: 5/22/2024     Plan of Care Certification Period: 8/22/24     Date of Return to MD: 6/2/24     Visit # / Visits authorized: 7 / 20  Insurance Authorization Period Expiration: 12/31/24     FOTO #1: 33%  FOTO #2:   FOTO #3:      Precautions:  Standard     Time In:915  Time Out:  10  Total Appointment Time (timed & untimed codes): 45 minutes      SUBJECTIVE     Pt reports: I was swollen after last time.   She was compliant with home exercise program given last session.   Response to previous treatment:Increased ability to participate in HEP  Functional change: More supple scar tissue noted    Pain: 4/10  Location: left hand       OBJECTIVE     Objective Measures updated at progress report unless specified.    Treatment     Johana received the treatments listed below:     Supervised modalities  for 8 min after being cleared for contradictions: Paraffin bath -    Manual therapy - 25 min   - PROM of digits with joint mobilizations grade I coupled with PROM and place and hold      Therapeutic Activities to improve functional performance for 10  minutes, including:  - blocking FDP, FDS, hook, wave, flat/full fist, ab/ad   - thumb flexion, blocking FPL, thumb opposition, ab/ad   - passive small finger flexion DIP, PIP, MP  - 10 reps each, 4-5 x daily         Patient Education and Home Exercises      Education provided:   - Cont HEP   -Tissue healing timeline  - Progress towards goals     Written Home Exercises Provided: Patient instructed to cont prior HEP.  Exercises were reviewed and Johana was able to demonstrate them prior to the end of the session.  Johana demonstrated good  understanding of the HEP provided. See EMR under Patient Instructions for exercises provided during therapy sessions.       Assessment     Johana is progressing well towards her goals and there are no updates to goals at this time. Pt prognosis is Good.    Pt will continue to benefit from skilled outpatient occupational therapy to address the deficits listed in the problem list on initial evaluation provide pt/family education and to maximize pt's level of independence in the home and community environment.     Pt's spiritual, cultural and educational needs considered and pt agreeable to plan of care and goals.    Anticipated  barriers to occupational therapy: none     The following goals were discussed with the patient and patient is in agreement with them as to be addressed in the treatment plan.      Short Term Goals: (6 weeks):  1)  Patient to be IND with HEP and modalities for pain management  2)  Increase ROM 3-10 degrees to increase functional hand use for ADLs/work/leisure activities  3)   Decrease edema .1-.5mm to increase joint mobility /flexibility for functional hand use.   4)  Assess   and pinch and set goals accordingly      Long Term Goals :To be met by discharge (12 weeks)   1)  Increase  strength 2-8 lbs. For driving, lifting, carrying   2) Increase pinch strength 1-4 psi's for typing   3)   Increase ROM 11-20 degrees to increase functional hand use for ADLs/work/leisure activities  4) Patient to score at __50___% OR MORE (FSM)  on FOTO to demonstrate improved perception of functional left  hand  Use.     Plan   Certification Period/Plan of care expiration: 5/22/2024 to 8/22/24.       Petra Hinojosa, OT

## 2024-07-03 ENCOUNTER — CLINICAL SUPPORT (OUTPATIENT)
Dept: REHABILITATION | Facility: HOSPITAL | Age: 59
End: 2024-07-03
Payer: COMMERCIAL

## 2024-07-03 DIAGNOSIS — M25.642 STIFFNESS OF LEFT HAND JOINT: ICD-10-CM

## 2024-07-03 DIAGNOSIS — M25.60 RANGE OF MOTION DEFICIT: ICD-10-CM

## 2024-07-03 DIAGNOSIS — R29.898 DECREASED GRIP STRENGTH OF LEFT HAND: ICD-10-CM

## 2024-07-03 DIAGNOSIS — M79.642 HAND PAIN, LEFT: Primary | ICD-10-CM

## 2024-07-03 PROCEDURE — 97018 PARAFFIN BATH THERAPY: CPT

## 2024-07-03 PROCEDURE — 97530 THERAPEUTIC ACTIVITIES: CPT

## 2024-07-03 PROCEDURE — 97140 MANUAL THERAPY 1/> REGIONS: CPT

## 2024-07-03 NOTE — PROGRESS NOTES
[facility]  Occupational Therapy Treatment Note    Date: 7/3/2024  Name: Johana Wright  Clinic Number: [id number]    Therapy Diagnosis:   1. Hand pain, left        2. Stiffness of left hand joint        3. Range of motion deficit        4. Decreased  strength of left hand              Medical Diagnosis: L Dupuytren's fasciotomy MF, RF, SM and thumb, 5/16/24  ICD-10:  Dupuytren's contracture of left hand [M72.0]  Dupuytren's disease of palm with contracture [M72.0]     Physician: Aysha Nowak MD     Physician Orders: Specific Treatment Requested  Dupuytren's  Fasciectomy  DOS 5.16.24 Pt to be seen 3 - 5 days post-op for the following:  Fabricate a custom splint in the following positions:  A hand-based, full extension pan splint.  ()  If there is a PIP flexion contracture of 30 deg or more, fabricate a second splint to alternate in: An intrinsic plus splint with MPS in flexion to encourage full IP extension. (Fran Rosa Splint) ()  Pt to attend therapy 3 times per week for ROM, wound care, and scar management.  Suture removal by therapy at 7-10 days post op or 10-14 days for DMII. Teach Scar Massage.  Pt is to see physician at 2- 4 weeks post-op. Await further orders to continue therapy     Surgical Procedure and Date: 5/16/24,   1.  Left small finger partial Dupuytren's fasciectomy with PIP joint release, cpt 53843  2.  Left ring finger partial Dupuytren's fasciectomy with PIP joint release, cpt 48696  3.  Left long finger partial Dupuytren's fasciectomy with PIP joint release, cpt 64749  4.  Left thumb partial Dupuytren's fasciectomy with PIP joint release, cpt 21312     Evaluation Date: 5/22/2024     Plan of Care Certification Period: 8/22/24     Date of Return to MD: 6/2/24     Visit # / Visits authorized: 14/ 20  Insurance Authorization Period Expiration: 12/31/24     FOTO #1: 33%  FOTO #2:   FOTO #3:      Precautions:  Standard     Time In:745 am  Time Out:  830 am  Total Appointment Time (timed & untimed codes): 45 minutes      SUBJECTIVE     Pt reports: She would like some water prior to starting today's session.     She was compliant with home exercise program given last session.   Response to previous treatment:Increased ability to participate in HEP  Functional change: More supple scar tissue noted    Pain: 4/10  Location: left hand       OBJECTIVE     Objective Measures updated at progress report unless specified.    Treatment     Johana received the treatments listed below:     Supervised modalities: Patient received paraffin bath to left hand(s) for 10 minutes to increase blood flow, circulation, pain management and for tissue elasticity prior to therex.      Manual therapy - 25 min   - PROM of digits with joint mobilizations grade I coupled with PROM and place and hold      Therapeutic Activities to improve functional performance for 10  minutes, including:  - blocking FDP, FDS, hook, wave, flat/full fist, ab/ad   - thumb flexion, blocking FPL, thumb opposition, ab/ad   - passive small finger flexion DIP, PIP, MP  - 10 reps each, 4-5 x daily     Patient Education and Home Exercises      Education provided:   - Cont HEP   -Tissue healing timeline  - Progress towards goals     Written Home Exercises Provided: Patient instructed to cont prior HEP.  Exercises were reviewed and Johana was able to demonstrate them prior to the end of the session.  Johana demonstrated good  understanding of the HEP provided. See EMR under Patient Instructions for exercises provided during therapy sessions.       Assessment     Johana is progressing well towards her goals and there are no updates to goals at this time. Pt prognosis is Good, she remains overally stiff both intrinsically and extrinsically     Pt will continue to benefit from skilled outpatient occupational therapy to address the deficits listed in the problem list on initial evaluation provide pt/family education and to  maximize pt's level of independence in the home and community environment.     Pt's spiritual, cultural and educational needs considered and pt agreeable to plan of care and goals.    Anticipated barriers to occupational therapy: none     The following goals were discussed with the patient and patient is in agreement with them as to be addressed in the treatment plan.      Short Term Goals: (6 weeks):  1)  Patient to be IND with HEP and modalities for pain management  2)  Increase ROM 3-10 degrees to increase functional hand use for ADLs/work/leisure activities  3)   Decrease edema .1-.5mm to increase joint mobility /flexibility for functional hand use.   4)  Assess   and pinch and set goals accordingly      Long Term Goals :To be met by discharge (12 weeks)   1)  Increase  strength 2-8 lbs. For driving, lifting, carrying   2) Increase pinch strength 1-4 psi's for typing   3)   Increase ROM 11-20 degrees to increase functional hand use for ADLs/work/leisure activities  4) Patient to score at __50___% OR MORE (FSM)  on FOTO to demonstrate improved perception of functional left  hand  Use.     Plan   Certification Period/Plan of care expiration: 5/22/2024 to 8/22/24.       Rip Cutler, OT

## 2024-07-09 ENCOUNTER — CLINICAL SUPPORT (OUTPATIENT)
Dept: REHABILITATION | Facility: HOSPITAL | Age: 59
End: 2024-07-09
Payer: COMMERCIAL

## 2024-07-09 DIAGNOSIS — M79.642 HAND PAIN, LEFT: Primary | ICD-10-CM

## 2024-07-09 DIAGNOSIS — M25.642 STIFFNESS OF LEFT HAND JOINT: ICD-10-CM

## 2024-07-09 DIAGNOSIS — R29.898 DECREASED GRIP STRENGTH OF LEFT HAND: ICD-10-CM

## 2024-07-09 DIAGNOSIS — M25.60 RANGE OF MOTION DEFICIT: ICD-10-CM

## 2024-07-09 PROCEDURE — 97140 MANUAL THERAPY 1/> REGIONS: CPT

## 2024-07-09 PROCEDURE — 97018 PARAFFIN BATH THERAPY: CPT

## 2024-07-09 PROCEDURE — 97530 THERAPEUTIC ACTIVITIES: CPT

## 2024-07-09 NOTE — PROGRESS NOTES
[facility]  Occupational Therapy Treatment Note    Date: 7/9/2024  Name: Johana Wright  Clinic Number: [id number]    Therapy Diagnosis:   1. Hand pain, left        2. Stiffness of left hand joint        3. Range of motion deficit        4. Decreased  strength of left hand                Medical Diagnosis: L Dupuytren's fasciotomy MF, RF, SM and thumb, 5/16/24  ICD-10:  Dupuytren's contracture of left hand [M72.0]  Dupuytren's disease of palm with contracture [M72.0]     Physician: Aysha oNwak MD     Physician Orders: Specific Treatment Requested  Dupuytren's  Fasciectomy  DOS 5.16.24 Pt to be seen 3 - 5 days post-op for the following:  Fabricate a custom splint in the following positions:  A hand-based, full extension pan splint.  ()  If there is a PIP flexion contracture of 30 deg or more, fabricate a second splint to alternate in: An intrinsic plus splint with MPS in flexion to encourage full IP extension. (Fran Rosa Splint) ()  Pt to attend therapy 3 times per week for ROM, wound care, and scar management.  Suture removal by therapy at 7-10 days post op or 10-14 days for DMII. Teach Scar Massage.  Pt is to see physician at 2- 4 weeks post-op. Await further orders to continue therapy     Surgical Procedure and Date: 5/16/24,   1.  Left small finger partial Dupuytren's fasciectomy with PIP joint release, cpt 81851  2.  Left ring finger partial Dupuytren's fasciectomy with PIP joint release, cpt 60062  3.  Left long finger partial Dupuytren's fasciectomy with PIP joint release, cpt 09906  4.  Left thumb partial Dupuytren's fasciectomy with PIP joint release, cpt 93935     Evaluation Date: 5/22/2024     Plan of Care Certification Period: 8/22/24     Date of Return to MD: 6/2/24     Visit # / Visits authorized: 24/ 20  Insurance Authorization Period Expiration: 12/31/24     FOTO #1: 33%  FOTO #2:   FOTO #3:      Precautions:  Standard     Time In:11:25 am  Time  Out: 12:05 pm  Total Appointment Time (timed & untimed codes): 40 minutes      SUBJECTIVE     Pt reports: She has been working hard at home using her splint, stretching and more.     She was compliant with home exercise program given last session.   Response to previous treatment:Increased ability to participate in HEP  Functional change: More supple scar tissue noted    Pain: 4/10  Location: left hand       OBJECTIVE     Edema. Measured in centimeters.    5/22/2024 7/9/2024          Proximal Wrist Crease 14.9 14.2   Proximal Palmar Crease  17 16.8   MCPs 17.2 `17.4         Hand ROM. Measured in degrees.    5/22/2024 7/9/2024                 Index: MP  0/80 0/65              PIP     0/46 -20/80              DIP 0/40 0/45              MARTIN  166 170          Long:  MP 0/70 0/65              PIP 0/70 0/67              DIP 0/60 0/45              MARTIN 200  177          Ring:   MP 0/68 0/65              PIP 0/56 0/40              DIP 0/55 0/17              MARTIN  179 122          Small:  MP 0/36 0/47               PIP 13/42 -42/50               DIP 0/10 0/10              MARTIN  75 65          Thumb: MP 0/56 66                IP 0/53 50          Opposition To RF P1 of SF         Strength (Dyanmometer) and Pinch Strength (Pinch Gauge)  Measured in pounds and psi. Average of three trials.    5/22/2024 5/22/2024     Right  Left    Rung II TBA TBA   Key Pinch TBA TBA   3pt Pinch TBA TBA   2pt Pinch TBA TBA         Manual Muscle Testing:  SF - FDP, FDS EDM, ADM, ODM 3-/5        CMS Impairment/Limitation/Restriction for FOTO Survey     Therapist reviewed FOTO scores for Johana Wright on 5/22/2024.   FOTO documents entered into [employer] - see Media section.     Limitation Score: ___33___%      Goal: ___56___%  Category: Self Care           Treatment     Johana received the treatments listed below:     Supervised modalities: Patient received paraffin bath to left hand(s) for 10 minutes to increase blood flow, circulation, pain  management and for tissue elasticity prior to therex.      Manual therapy - 25 min   - PROM of digits with joint mobilizations grade I coupled with PROM and place and hold   - STM to dorsal forearm to work on extensor extrinsics  - STM to incision areas and intrinsics     Therapeutic Activities to improve functional performance for 10  minutes, including:  - blocking FDP, FDS, hook, wave, flat/full fist, ab/ad   - thumb flexion, blocking FPL, thumb opposition, ab/ad   - passive small finger flexion DIP, PIP, MP  - 10 reps each, 4-5 x daily   - objective measurements re-assessed this date.     Patient Education and Home Exercises      Education provided:   - Cont HEP   - Progress towards goals  - Updated HEP    Written Home Exercises Provided: Patient instructed to cont prior HEP.  Exercises were reviewed and Johana was able to demonstrate them prior to the end of the session.  Johana demonstrated good  understanding of the HEP provided. See EMR under Patient Instructions for exercises provided during therapy sessions.       Assessment     Johana is progressing well towards her goals and there are no updates to goals at this time. Pt prognosis is Good, she remains overally stiff both intrinsically and extrinsically. Some increase in range of motion for digits 1 and 2 but decreased MARTIN for the remaining digits. Noted decrease in MP flexion this date for digits 2-5 and noted extension lag for the IF and SF. The therapist recommended the patient stretch the extrinsic musculature for the wrist as part of updated HEP.     Pt will continue to benefit from skilled outpatient occupational therapy to address the deficits listed in the problem list on initial evaluation provide pt/family education and to maximize pt's level of independence in the home and community environment.     Pt's spiritual, cultural and educational needs considered and pt agreeable to plan of care and goals.    Anticipated barriers to occupational therapy:  none     The following goals were discussed with the patient and patient is in agreement with them as to be addressed in the treatment plan.      Short Term Goals: (6 weeks):  1)  Patient to be IND with HEP and modalities for pain management  2)  Increase ROM 3-10 degrees to increase functional hand use for ADLs/work/leisure activities  3)   Decrease edema .1-.5mm to increase joint mobility /flexibility for functional hand use.   4)  Assess   and pinch and set goals accordingly      Long Term Goals :To be met by discharge (12 weeks)   1)  Increase  strength 2-8 lbs. For driving, lifting, carrying   2) Increase pinch strength 1-4 psi's for typing   3)   Increase ROM 11-20 degrees to increase functional hand use for ADLs/work/leisure activities  4) Patient to score at __50___% OR MORE (FSM)  on FOTO to demonstrate improved perception of functional left  hand  Use.     Plan   Certification Period/Plan of care expiration: 5/22/2024 to 8/22/24.       RAHUL Jain/L

## 2024-07-09 NOTE — PATIENT INSTRUCTIONS
Heat your elbow 10-15 minutes prior to exercising if possible    Wrist Extensor Stretch        Keeping elbow straight, grasp left hand and slowly bend wrist forward until stretch is felt. Hold 10 seconds. Relax.  Repeat 3 times for 30 seconds times. Do 3 sessions per day.    Copyright © [employer]. All rights reserved.      Wrist Flexor Stretch        Keeping elbow straight, grasp left hand and slowly bend wrist back until stretch is felt. Hold 10 seconds. Relax.  Repeat 3 times for 30 seconds. Do 3 sessions per day.

## 2024-07-15 ENCOUNTER — CLINICAL SUPPORT (OUTPATIENT)
Dept: REHABILITATION | Facility: HOSPITAL | Age: 59
End: 2024-07-15
Payer: COMMERCIAL

## 2024-07-15 DIAGNOSIS — M79.642 HAND PAIN, LEFT: Primary | ICD-10-CM

## 2024-07-15 DIAGNOSIS — R29.898 DECREASED GRIP STRENGTH OF LEFT HAND: ICD-10-CM

## 2024-07-15 DIAGNOSIS — M25.60 RANGE OF MOTION DEFICIT: ICD-10-CM

## 2024-07-15 DIAGNOSIS — M25.642 STIFFNESS OF LEFT HAND JOINT: ICD-10-CM

## 2024-07-15 PROCEDURE — 97530 THERAPEUTIC ACTIVITIES: CPT

## 2024-07-15 PROCEDURE — 97140 MANUAL THERAPY 1/> REGIONS: CPT

## 2024-07-15 PROCEDURE — 97018 PARAFFIN BATH THERAPY: CPT

## 2024-07-17 ENCOUNTER — CLINICAL SUPPORT (OUTPATIENT)
Dept: REHABILITATION | Facility: HOSPITAL | Age: 59
End: 2024-07-17
Payer: COMMERCIAL

## 2024-07-17 DIAGNOSIS — R29.898 DECREASED GRIP STRENGTH OF LEFT HAND: ICD-10-CM

## 2024-07-17 DIAGNOSIS — M25.60 RANGE OF MOTION DEFICIT: ICD-10-CM

## 2024-07-17 DIAGNOSIS — M79.642 HAND PAIN, LEFT: Primary | ICD-10-CM

## 2024-07-17 DIAGNOSIS — M25.642 STIFFNESS OF LEFT HAND JOINT: ICD-10-CM

## 2024-07-17 PROCEDURE — 97018 PARAFFIN BATH THERAPY: CPT

## 2024-07-17 PROCEDURE — 97530 THERAPEUTIC ACTIVITIES: CPT

## 2024-07-17 PROCEDURE — 97140 MANUAL THERAPY 1/> REGIONS: CPT

## 2024-07-22 NOTE — PROGRESS NOTES
[facility]  Occupational Therapy Treatment Note    Date: 7/15/2024  Name: Johana Wright  Clinic Number: [id number]    Therapy Diagnosis:   1. Hand pain, left        2. Stiffness of left hand joint        3. Range of motion deficit        4. Decreased  strength of left hand             Medical Diagnosis: L Dupuytren's fasciotomy MF, RF, SM and thumb, 5/16/24  ICD-10:  Dupuytren's contracture of left hand [M72.0]  Dupuytren's disease of palm with contracture [M72.0]     Physician: Aysha Nowak MD     Physician Orders: Specific Treatment Requested  Dupuytren's  Fasciectomy  DOS 5.16.24 Pt to be seen 3 - 5 days post-op for the following:  Fabricate a custom splint in the following positions:  A hand-based, full extension pan splint.  ()  If there is a PIP flexion contracture of 30 deg or more, fabricate a second splint to alternate in: An intrinsic plus splint with MPS in flexion to encourage full IP extension. (Fran Rosa Splint) ()  Pt to attend therapy 3 times per week for ROM, wound care, and scar management.  Suture removal by therapy at 7-10 days post op or 10-14 days for DMII. Teach Scar Massage.  Pt is to see physician at 2- 4 weeks post-op. Await further orders to continue therapy     Surgical Procedure and Date: 5/16/24,   1.  Left small finger partial Dupuytren's fasciectomy with PIP joint release, cpt 47270  2.  Left ring finger partial Dupuytren's fasciectomy with PIP joint release, cpt 78718  3.  Left long finger partial Dupuytren's fasciectomy with PIP joint release, cpt 90873  4.  Left thumb partial Dupuytren's fasciectomy with PIP joint release, cpt 21255     Evaluation Date: 5/22/2024     Plan of Care Certification Period: 8/22/24     Date of Return to MD: 6/2/24     Visit # / Visits authorized: 8/ 20  Insurance Authorization Period Expiration: 12/31/24     FOTO #1: 33%  FOTO #2:   FOTO #3:      Precautions:  Standard     Time In:11:25 am  Time Out:  12:05 pm  Total Appointment Time (timed & untimed codes): 40 minutes      SUBJECTIVE     Pt reports: She has been working hard at home using her splint, stretching and more. But she feels still very stiff.      She was compliant with home exercise program given last session.   Response to previous treatment:Increased ability to participate in HEP  Functional change: More supple scar tissue noted    Pain: 4/10  Location: left hand       OBJECTIVE     Edema. Measured in centimeters.    5/22/2024 7/9/2024          Proximal Wrist Crease 14.9 14.2   Proximal Palmar Crease  17 16.8   MCPs 17.2 `17.4         Hand ROM. Measured in degrees.    5/22/2024 7/9/2024                 Index: MP  0/80 0/65              PIP     0/46 -20/80              DIP 0/40 0/45              MARTIN  166 170          Long:  MP 0/70 0/65              PIP 0/70 0/67              DIP 0/60 0/45              MARTIN 200  177          Ring:   MP 0/68 0/65              PIP 0/56 0/40              DIP 0/55 0/17              MARTIN  179 122          Small:  MP 0/36 0/47               PIP 13/42 -42/50               DIP 0/10 0/10              MARTIN  75 65          Thumb: MP 0/56 66                IP 0/53 50          Opposition To RF P1 of SF         Strength (Dyanmometer) and Pinch Strength (Pinch Gauge)  Measured in pounds and psi. Average of three trials.    5/22/2024 5/22/2024     Right  Left    Rung II TBA TBA   Key Pinch TBA TBA   3pt Pinch TBA TBA   2pt Pinch TBA TBA         Manual Muscle Testing:  SF - FDP, FDS EDM, ADM, ODM 3-/5        CMS Impairment/Limitation/Restriction for FOTO Survey     Therapist reviewed FOTO scores for Johana Wright on 5/22/2024.   FOTO documents entered into [employer] - see Media section.     Limitation Score: ___33___%      Goal: ___56___%  Category: Self Care           Treatment     Johana received the treatments listed below:     Supervised modalities: Patient received paraffin bath to left hand(s) for 10 minutes to increase blood  flow, circulation, pain management and for tissue elasticity prior to therex.      Manual therapy - 25 min   - PROM of digits with joint mobilizations grade I coupled with PROM and place and hold   - STM to dorsal forearm to work on extensor extrinsics  - STM to incision areas and intrinsics     Therapeutic Activities to improve functional performance for 10  minutes, including:  - blocking FDP, FDS, hook, wave, flat/full fist, ab/ad   - thumb flexion, blocking FPL, thumb opposition, ab/ad   - passive small finger flexion DIP, PIP, MP  - 10 reps each, 4-5 x daily   - objective measurements re-assessed this date.     Patient Education and Home Exercises      Education provided:   - Cont HEP   - Progress towards goals  - Updated HEP    Written Home Exercises Provided: Patient instructed to cont prior HEP.  Exercises were reviewed and Johana was able to demonstrate them prior to the end of the session.  Johana demonstrated good  understanding of the HEP provided. See EMR under Patient Instructions for exercises provided during therapy sessions.       Assessment     Johana is progressing well towards her goals and there are no updates to goals at this time. Pt prognosis is Good, she remains overally stiff both intrinsically and extrinsically. Scar tissue improving, added additional estomere to orthotic today for scar remodeling.     Pt will continue to benefit from skilled outpatient occupational therapy to address the deficits listed in the problem list on initial evaluation provide pt/family education and to maximize pt's level of independence in the home and community environment.     Pt's spiritual, cultural and educational needs considered and pt agreeable to plan of care and goals.    Anticipated barriers to occupational therapy: none     The following goals were discussed with the patient and patient is in agreement with them as to be addressed in the treatment plan.      Short Term Goals: (6 weeks):  1)  Patient to  be IND with HEP and modalities for pain management  2)  Increase ROM 3-10 degrees to increase functional hand use for ADLs/work/leisure activities  3)   Decrease edema .1-.5mm to increase joint mobility /flexibility for functional hand use.   4)  Assess   and pinch and set goals accordingly      Long Term Goals :To be met by discharge (12 weeks)   1)  Increase  strength 2-8 lbs. For driving, lifting, carrying   2) Increase pinch strength 1-4 psi's for typing   3)   Increase ROM 11-20 degrees to increase functional hand use for ADLs/work/leisure activities  4) Patient to score at __50___% OR MORE (FSM)  on FOTO to demonstrate improved perception of functional left  hand  Use.     Plan   Certification Period/Plan of care expiration: 5/22/2024 to 8/22/24.       Petra Hinojosa, OTR/L

## 2024-07-22 NOTE — PROGRESS NOTES
[facility]  Occupational Therapy Treatment Note    Date: 7/17/2024  Name: Johana Wright  Clinic Number: [id number]    Therapy Diagnosis:   1. Hand pain, left        2. Stiffness of left hand joint        3. Range of motion deficit        4. Decreased  strength of left hand             Medical Diagnosis: L Dupuytren's fasciotomy MF, RF, SM and thumb, 5/16/24  ICD-10:  Dupuytren's contracture of left hand [M72.0]  Dupuytren's disease of palm with contracture [M72.0]     Physician: Aysha Nowak MD     Physician Orders: Specific Treatment Requested  Dupuytren's  Fasciectomy  DOS 5.16.24 Pt to be seen 3 - 5 days post-op for the following:  Fabricate a custom splint in the following positions:  A hand-based, full extension pan splint.  ()  If there is a PIP flexion contracture of 30 deg or more, fabricate a second splint to alternate in: An intrinsic plus splint with MPS in flexion to encourage full IP extension. (Fran Rosa Splint) ()  Pt to attend therapy 3 times per week for ROM, wound care, and scar management.  Suture removal by therapy at 7-10 days post op or 10-14 days for DMII. Teach Scar Massage.  Pt is to see physician at 2- 4 weeks post-op. Await further orders to continue therapy     Surgical Procedure and Date: 5/16/24,   1.  Left small finger partial Dupuytren's fasciectomy with PIP joint release, cpt 19579  2.  Left ring finger partial Dupuytren's fasciectomy with PIP joint release, cpt 26115  3.  Left long finger partial Dupuytren's fasciectomy with PIP joint release, cpt 44117  4.  Left thumb partial Dupuytren's fasciectomy with PIP joint release, cpt 69002     Evaluation Date: 5/22/2024     Plan of Care Certification Period: 8/22/24     Date of Return to MD: 6/2/24     Visit # / Visits authorized: 9/ 20  Insurance Authorization Period Expiration: 12/31/24     FOTO #1: 33%  FOTO #2:   FOTO #3:      Precautions:  Standard     Time In:11:25 am  Time Out:  12:05 pm  Total Appointment Time (timed & untimed codes): 40 minutes      SUBJECTIVE     Pt reports: She has been working hard at home using her splint, stretching and more. But she feels still very stiff.      She was compliant with home exercise program given last session.   Response to previous treatment:Increased ability to participate in HEP  Functional change: More supple scar tissue noted    Pain: 4/10  Location: left hand       OBJECTIVE     Edema. Measured in centimeters.    5/22/2024 7/9/2024          Proximal Wrist Crease 14.9 14.2   Proximal Palmar Crease  17 16.8   MCPs 17.2 `17.4         Hand ROM. Measured in degrees.    5/22/2024 7/9/2024                 Index: MP  0/80 0/65              PIP     0/46 -20/80              DIP 0/40 0/45              MARTIN  166 170          Long:  MP 0/70 0/65              PIP 0/70 0/67              DIP 0/60 0/45              MARTIN 200  177          Ring:   MP 0/68 0/65              PIP 0/56 0/40              DIP 0/55 0/17              MARTIN  179 122          Small:  MP 0/36 0/47               PIP 13/42 -42/50               DIP 0/10 0/10              MARTIN  75 65          Thumb: MP 0/56 66                IP 0/53 50          Opposition To RF P1 of SF         Strength (Dyanmometer) and Pinch Strength (Pinch Gauge)  Measured in pounds and psi. Average of three trials.    5/22/2024 5/22/2024     Right  Left    Rung II TBA TBA   Key Pinch TBA TBA   3pt Pinch TBA TBA   2pt Pinch TBA TBA         Manual Muscle Testing:  SF - FDP, FDS EDM, ADM, ODM 3-/5        CMS Impairment/Limitation/Restriction for FOTO Survey     Therapist reviewed FOTO scores for Johana Wright on 5/22/2024.   FOTO documents entered into [employer] - see Media section.     Limitation Score: ___33___%      Goal: ___56___%  Category: Self Care           Treatment     Johana received the treatments listed below:     Supervised modalities: Patient received paraffin bath to left hand(s) for 10 minutes to increase blood  flow, circulation, pain management and for tissue elasticity prior to therex.      Manual therapy - 25 min   - PROM of digits with joint mobilizations grade I coupled with PROM and place and hold   - STM to dorsal forearm to work on extensor extrinsics  - STM to incision areas and intrinsics     Therapeutic Activities to improve functional performance for 10  minutes, including:  - blocking FDP, FDS, hook, wave, flat/full fist, ab/ad   - thumb flexion, blocking FPL, thumb opposition, ab/ad   - passive small finger flexion DIP, PIP, MP  - 10 reps each, 4-5 x daily   - objective measurements re-assessed this date.     *attempted NMES activation x 5 min     Patient Education and Home Exercises      Education provided:   - Cont HEP   - Progress towards goals  - Updated HEP    Written Home Exercises Provided: Patient instructed to cont prior HEP.  Exercises were reviewed and Johana was able to demonstrate them prior to the end of the session.  Johana demonstrated good  understanding of the HEP provided. See EMR under Patient Instructions for exercises provided during therapy sessions.       Assessment     Johana is progressing fairly well towards her goals and there are no updates to goals at this time. Pt prognosis is Good, she remains overally stiff , scar remains soft. Recommend starting patient on dynasplint for digit composite flexion to assist with continued tissue elasticity and promoting composite fist.     Pt will continue to benefit from skilled outpatient occupational therapy to address the deficits listed in the problem list on initial evaluation provide pt/family education and to maximize pt's level of independence in the home and community environment.     Pt's spiritual, cultural and educational needs considered and pt agreeable to plan of care and goals.    Anticipated barriers to occupational therapy: none     The following goals were discussed with the patient and patient is in agreement with them as to be  addressed in the treatment plan.      Short Term Goals: (6 weeks):  1)  Patient to be IND with HEP and modalities for pain management  2)  Increase ROM 3-10 degrees to increase functional hand use for ADLs/work/leisure activities  3)   Decrease edema .1-.5mm to increase joint mobility /flexibility for functional hand use.   4)  Assess   and pinch and set goals accordingly      Long Term Goals :To be met by discharge (12 weeks)   1)  Increase  strength 2-8 lbs. For driving, lifting, carrying   2) Increase pinch strength 1-4 psi's for typing   3)   Increase ROM 11-20 degrees to increase functional hand use for ADLs/work/leisure activities  4) Patient to score at __50___% OR MORE (FSM)  on FOTO to demonstrate improved perception of functional left  hand  Use.     Plan   Certification Period/Plan of care expiration: 5/22/2024 to 8/22/24.       Petra Hinojosa, OTR/L

## 2024-07-26 ENCOUNTER — CLINICAL SUPPORT (OUTPATIENT)
Dept: REHABILITATION | Facility: HOSPITAL | Age: 59
End: 2024-07-26
Payer: COMMERCIAL

## 2024-07-26 DIAGNOSIS — M79.642 HAND PAIN, LEFT: Primary | ICD-10-CM

## 2024-07-26 DIAGNOSIS — M25.60 RANGE OF MOTION DEFICIT: ICD-10-CM

## 2024-07-26 DIAGNOSIS — M25.642 STIFFNESS OF LEFT HAND JOINT: ICD-10-CM

## 2024-07-26 DIAGNOSIS — R29.898 DECREASED GRIP STRENGTH OF LEFT HAND: ICD-10-CM

## 2024-07-26 PROCEDURE — 97018 PARAFFIN BATH THERAPY: CPT

## 2024-07-26 PROCEDURE — 97530 THERAPEUTIC ACTIVITIES: CPT

## 2024-07-26 PROCEDURE — 97014 ELECTRIC STIMULATION THERAPY: CPT

## 2024-07-26 PROCEDURE — 97140 MANUAL THERAPY 1/> REGIONS: CPT

## 2024-07-29 ENCOUNTER — CLINICAL SUPPORT (OUTPATIENT)
Dept: REHABILITATION | Facility: HOSPITAL | Age: 59
End: 2024-07-29
Payer: COMMERCIAL

## 2024-07-29 DIAGNOSIS — M25.642 STIFFNESS OF LEFT HAND JOINT: ICD-10-CM

## 2024-07-29 DIAGNOSIS — M79.642 HAND PAIN, LEFT: Primary | ICD-10-CM

## 2024-07-29 DIAGNOSIS — M25.60 RANGE OF MOTION DEFICIT: ICD-10-CM

## 2024-07-29 DIAGNOSIS — R29.898 DECREASED GRIP STRENGTH OF LEFT HAND: ICD-10-CM

## 2024-07-29 PROCEDURE — 97014 ELECTRIC STIMULATION THERAPY: CPT

## 2024-07-29 PROCEDURE — 97018 PARAFFIN BATH THERAPY: CPT

## 2024-07-29 PROCEDURE — 97150 GROUP THERAPEUTIC PROCEDURES: CPT

## 2024-07-29 PROCEDURE — 97530 THERAPEUTIC ACTIVITIES: CPT

## 2024-07-29 PROCEDURE — 97140 MANUAL THERAPY 1/> REGIONS: CPT

## 2024-07-29 NOTE — PROGRESS NOTES
[facility]  Occupational Therapy Treatment Note    Date: 7/29/2024  Name: Johana Wright  Clinic Number: [id number]    Therapy Diagnosis:   1. Hand pain, left        2. Stiffness of left hand joint        3. Range of motion deficit        4. Decreased  strength of left hand             Medical Diagnosis: L Dupuytren's fasciotomy MF, RF, SM and thumb, 5/16/24  ICD-10:  Dupuytren's contracture of left hand [M72.0]  Dupuytren's disease of palm with contracture [M72.0]     Physician: Aysha Nowak MD     Physician Orders: Specific Treatment Requested  Dupuytren's  Fasciectomy  DOS 5.16.24 Pt to be seen 3 - 5 days post-op for the following:  Fabricate a custom splint in the following positions:  A hand-based, full extension pan splint.  ()  If there is a PIP flexion contracture of 30 deg or more, fabricate a second splint to alternate in: An intrinsic plus splint with MPS in flexion to encourage full IP extension. (Fran Rosa Splint) ()  Pt to attend therapy 3 times per week for ROM, wound care, and scar management.  Suture removal by therapy at 7-10 days post op or 10-14 days for DMII. Teach Scar Massage.  Pt is to see physician at 2- 4 weeks post-op. Await further orders to continue therapy     Surgical Procedure and Date: 5/16/24,   1.  Left small finger partial Dupuytren's fasciectomy with PIP joint release, cpt 20851  2.  Left ring finger partial Dupuytren's fasciectomy with PIP joint release, cpt 30337  3.  Left long finger partial Dupuytren's fasciectomy with PIP joint release, cpt 09388  4.  Left thumb partial Dupuytren's fasciectomy with PIP joint release, cpt 60617     Evaluation Date: 5/22/2024     Plan of Care Certification Period: 8/22/24     Date of Return to MD: 6/2/24     Visit # / Visits authorized: 9/ 20  Insurance Authorization Period Expiration: 12/31/24     FOTO #1: 33%  FOTO #2:   FOTO #3:      Precautions:  Standard     Time In:11:25 am  Time Out:  12:05 pm  Total Appointment Time (timed & untimed codes): 40 minutes      SUBJECTIVE     Pt reports: She has been working hard at home using her splint, stretching and more. But she feels still very stiff.      She was compliant with home exercise program given last session.   Response to previous treatment:Increased ability to participate in HEP  Functional change: More supple scar tissue noted    Pain: 4/10  Location: left hand       OBJECTIVE     Edema. Measured in centimeters.    5/22/2024 7/9/2024          Proximal Wrist Crease 14.9 14.2   Proximal Palmar Crease  17 16.8   MCPs 17.2 `17.4         Hand ROM. Measured in degrees.    5/22/2024 7/9/2024 7/24/2024                   Index: MP  0/80 0/65 0/75              PIP     0/46 -20/80 -22/80              DIP 0/40 0/45 0/44              MARTIN  166 170 177 (+7)           Long:  MP 0/70 0/65 -45/60              PIP 0/70 0/67 0/74              DIP 0/60 0/45 0/54              MARTIN 200  177 143           Ring:   MP 0/68 0/65 -41/65              PIP 0/56 0/40 36/55              DIP 0/55 0/17 0/30              MARTIN  179 122 105           Small:  MP 0/36 0/47 0/55               PIP 13/42 -42/50 -48/70               DIP 0/10 0/10 0/15              MARTIN  75 65 95 (+30)           Thumb: MP 0/56 66 65                IP 0/53 50 46          Opposition To RF P1 of SF P1 of SF         Strength (Dyanmometer) and Pinch Strength (Pinch Gauge)  Measured in pounds and psi. Average of three trials.    5/22/2024 5/22/2024     Right  Left    Rung II TBA TBA   Key Pinch TBA TBA   3pt Pinch TBA TBA   2pt Pinch TBA TBA         Manual Muscle Testing:  SF - FDP, FDS EDM, ADM, ODM 3-/5        CMS Impairment/Limitation/Restriction for FOTO Survey     Therapist reviewed FOTO scores for Johana Wright on 5/22/2024.   FOTO documents entered into [employer] - see Media section.     Limitation Score: ___33___%      Goal: ___56___%  Category: Self Care           Treatment     Johana received the  treatments listed below:      Supervised modalities: Patient received paraffin bath to left hand(s) for 10 minutes to increase blood flow, circulation, pain management and for tissue elasticity prior to therex.       Manual therapy - 25 min   - PROM of digits with joint mobilizations grade I coupled with PROM and place and hold   - STM to dorsal forearm to work on extensor extrinsics  - STM to incision areas and intrinsics     Therapeutic Activities to improve functional performance for 10  minutes, including:  - blocking FDP, FDS, hook, wave, flat/full fist, ab/ad   - thumb flexion, blocking FPL, thumb opposition, ab/ad   - passive small finger flexion DIP, PIP, MP  - 10 reps each, 4-5 x daily   - objective measurements re-assessed this date.      Patient received NMES for muscle reeducation applied to volar forearm near FDS belly as well as in hand near ulnar intrinsics @ 24 pps, waveform = symmetric, cycle = 10 sec on time and 10 sec off time for 15 minutes total.       Patient Education and Home Exercises       Education provided:   - Cont HEP   - Progress towards goals  - Updated HEP     Written Home Exercises Provided: Patient instructed to cont prior HEP.  Exercises were reviewed and Johana was able to demonstrate them prior to the end of the session.  Johana demonstrated good  understanding of the HEP provided. See EMR under Patient Instructions for exercises provided during therapy sessions.       Assessment      Johana is progressing fairly well towards her goals and there are no updates to goals at this time. Pt prognosis is fair, she remains overally stiff , scar softening with interventions. Minimal contraction noted with NMES. Overall hard end feel on PIP joint of small finger. Recommend starting patient on dynasplint for digit composite flexion to assist with continued tissue elasticity and promoting composite fist.      Pt will continue to benefit from skilled outpatient occupational therapy to address  the deficits listed in the problem list on initial evaluation provide pt/family education and to maximize pt's level of independence in the home and community environment.      Pt's spiritual, cultural and educational needs considered and pt agreeable to plan of care and goals.     Anticipated barriers to occupational therapy: none      The following goals were discussed with the patient and patient is in agreement with them as to be addressed in the treatment plan.      Short Term Goals: (6 weeks):  1)  Patient to be IND with HEP and modalities for pain management  2)  Increase ROM 3-10 degrees to increase functional hand use for ADLs/work/leisure activities  3)   Decrease edema .1-.5mm to increase joint mobility /flexibility for functional hand use.   4)  Assess   and pinch and set goals accordingly      Long Term Goals :To be met by discharge (12 weeks)   1)  Increase  strength 2-8 lbs. For driving, lifting, carrying   2) Increase pinch strength 1-4 psi's for typing   3)   Increase ROM 11-20 degrees to increase functional hand use for ADLs/work/leisure activities  4) Patient to score at __50___% OR MORE (FSM)  on FOTO to demonstrate improved perception of functional left  hand  Use.     Plan   Certification Period/Plan of care expiration: 5/22/2024 to 8/22/24.        Petra Hinojosa OTR/L

## 2024-07-31 ENCOUNTER — CLINICAL SUPPORT (OUTPATIENT)
Dept: REHABILITATION | Facility: HOSPITAL | Age: 59
End: 2024-07-31
Payer: COMMERCIAL

## 2024-07-31 DIAGNOSIS — M25.60 RANGE OF MOTION DEFICIT: ICD-10-CM

## 2024-07-31 DIAGNOSIS — M79.642 HAND PAIN, LEFT: Primary | ICD-10-CM

## 2024-07-31 DIAGNOSIS — M25.642 STIFFNESS OF LEFT HAND JOINT: ICD-10-CM

## 2024-07-31 DIAGNOSIS — R29.898 DECREASED GRIP STRENGTH OF LEFT HAND: ICD-10-CM

## 2024-07-31 PROCEDURE — 97018 PARAFFIN BATH THERAPY: CPT

## 2024-07-31 PROCEDURE — 97140 MANUAL THERAPY 1/> REGIONS: CPT

## 2024-07-31 PROCEDURE — 97530 THERAPEUTIC ACTIVITIES: CPT

## 2024-07-31 NOTE — PROGRESS NOTES
[facility]  Occupational Therapy Treatment Note    Date: 7/31/2024  Name: Johana Wright  Clinic Number: [id number]    Therapy Diagnosis:   1. Hand pain, left        2. Stiffness of left hand joint        3. Range of motion deficit        4. Decreased  strength of left hand               Medical Diagnosis: L Dupuytren's fasciotomy MF, RF, SM and thumb, 5/16/24  ICD-10:  Dupuytren's contracture of left hand [M72.0]  Dupuytren's disease of palm with contracture [M72.0]     Physician: Aysha Nowak MD     Physician Orders: Specific Treatment Requested  Dupuytren's  Fasciectomy  DOS 5.16.24 Pt to be seen 3 - 5 days post-op for the following:  Fabricate a custom splint in the following positions:  A hand-based, full extension pan splint.  ()  If there is a PIP flexion contracture of 30 deg or more, fabricate a second splint to alternate in: An intrinsic plus splint with MPS in flexion to encourage full IP extension. (Fran Rosa Splint) ()  Pt to attend therapy 3 times per week for ROM, wound care, and scar management.  Suture removal by therapy at 7-10 days post op or 10-14 days for DMII. Teach Scar Massage.  Pt is to see physician at 2- 4 weeks post-op. Await further orders to continue therapy     Surgical Procedure and Date: 5/16/24,   1.  Left small finger partial Dupuytren's fasciectomy with PIP joint release, cpt 65379  2.  Left ring finger partial Dupuytren's fasciectomy with PIP joint release, cpt 67656  3.  Left long finger partial Dupuytren's fasciectomy with PIP joint release, cpt 01999  4.  Left thumb partial Dupuytren's fasciectomy with PIP joint release, cpt 15522     Evaluation Date: 5/22/2024     Plan of Care Certification Period: 8/22/24     Date of Return to MD: 6/2/24     Visit # / Visits authorized: 9/ 20  Insurance Authorization Period Expiration: 12/31/24     FOTO #1: 33%  FOTO #2:   FOTO #3:      Precautions:  Standard     Time In:12:00 am  Time  Out: 12:45 am  Total Appointment Time (timed & untimed codes): 45 minutes      SUBJECTIVE     Pt reports: She has been working hard at home using her splint, stretching and more. But she feels still very stiff.      She was compliant with home exercise program given last session.   Response to previous treatment:Increased ability to participate in HEP  Functional change: More supple scar tissue noted    Pain: 4/10  Location: left hand       OBJECTIVE     Edema. Measured in centimeters.    5/22/2024 7/9/2024          Proximal Wrist Crease 14.9 14.2   Proximal Palmar Crease  17 16.8   MCPs 17.2 `17.4         Hand ROM. Measured in degrees.    5/22/2024 7/9/2024 7/24/2024                   Index: MP  0/80 0/65 0/75              PIP     0/46 -20/80 -22/80              DIP 0/40 0/45 0/44              MARTIN  166 170 177 (+7)           Long:  MP 0/70 0/65 -45/60              PIP 0/70 0/67 0/74              DIP 0/60 0/45 0/54              MARTIN 200  177 143           Ring:   MP 0/68 0/65 -41/65              PIP 0/56 0/40 36/55              DIP 0/55 0/17 0/30              MARTIN  179 122 105           Small:  MP 0/36 0/47 0/55               PIP 13/42 -42/50 -48/70               DIP 0/10 0/10 0/15              MARTIN  75 65 95 (+30)           Thumb: MP 0/56 66 65                IP 0/53 50 46          Opposition To RF P1 of SF P1 of SF         Strength (Dyanmometer) and Pinch Strength (Pinch Gauge)  Measured in pounds and psi. Average of three trials.    5/22/2024 5/22/2024     Right  Left    Rung II TBA TBA   Key Pinch TBA TBA   3pt Pinch TBA TBA   2pt Pinch TBA TBA         Manual Muscle Testing:  SF - FDP, FDS EDM, ADM, ODM 3-/5        CMS Impairment/Limitation/Restriction for FOTO Survey     Therapist reviewed FOTO scores for Johana Wright on 5/22/2024.   FOTO documents entered into [employer] - see Media section.     Limitation Score: ___33___%      Goal: ___56___%  Category: Self Care           Treatment     Johana received the  "treatments listed below:     Supervised modalities: Patient received paraffin bath to left hand(s) for 10 minutes to increase blood flow, circulation, pain management and for tissue elasticity prior to therex.      Manual therapy - 25 min   - PROM of digits with joint mobilizations grade I coupled with PROM and place and hold   - STM to dorsal forearm to work on extensor extrinsics  - STM to incision areas and intrinsics  - intrinsic stretching x 10 with 5" holds for digits 4-5     Therapeutic Activities to improve functional performance for 10 minutes, including:  - blocking FDP, FDS, hook, wave, flat/full fist, ab/ad   - thumb flexion, blocking FPL, thumb opposition, ab/ad  (NT)  - passive small finger flexion DIP, PIP, MP  - 10 reps each, 4-5 x daily      *attempted NMES activation x 5 min (NT )    Patient Education and Home Exercises      Education provided:   - Cont HEP   - Progress towards goals    Written Home Exercises Provided: Patient instructed to cont prior HEP.  Exercises were reviewed and Johana was able to demonstrate them prior to the end of the session.  Johana demonstrated good  understanding of the HEP provided. See EMR under Patient Instructions for exercises provided during therapy sessions.       Assessment     Johana is progressing fairly well towards her goals and there are no updates to goals at this time. Pt prognosis is Good. She remains overally stiff , scar remains soft. Recommend starting patient on dynasplint for digit composite flexion to assist with continued tissue elasticity and promoting composite fist.     Pt will continue to benefit from skilled outpatient occupational therapy to address the deficits listed in the problem list on initial evaluation provide pt/family education and to maximize pt's level of independence in the home and community environment.     Pt's spiritual, cultural and educational needs considered and pt agreeable to plan of care and goals.    Anticipated " barriers to occupational therapy: none     The following goals were discussed with the patient and patient is in agreement with them as to be addressed in the treatment plan.      Short Term Goals: (6 weeks):  1)  Patient to be IND with HEP and modalities for pain management  2)  Increase ROM 3-10 degrees to increase functional hand use for ADLs/work/leisure activities  3)   Decrease edema .1-.5mm to increase joint mobility /flexibility for functional hand use.   4)  Assess   and pinch and set goals accordingly      Long Term Goals :To be met by discharge (12 weeks)   1)  Increase  strength 2-8 lbs. For driving, lifting, carrying   2) Increase pinch strength 1-4 psi's for typing   3)   Increase ROM 11-20 degrees to increase functional hand use for ADLs/work/leisure activities  4) Patient to score at __50___% OR MORE (FSM)  on FOTO to demonstrate improved perception of functional left  hand  Use.     Plan   Certification Period/Plan of care expiration: 5/22/2024 to 8/22/24.     RAHUL Jain/L

## 2024-08-01 NOTE — PROGRESS NOTES
[facility]  Occupational Therapy Treatment Note    Date: 7/26/2024  Name: Johana Wright  Clinic Number: [id number]    Therapy Diagnosis:   1. Hand pain, left        2. Stiffness of left hand joint        3. Range of motion deficit        4. Decreased  strength of left hand             Medical Diagnosis: L Dupuytren's fasciotomy MF, RF, SM and thumb, 5/16/24  ICD-10:  Dupuytren's contracture of left hand [M72.0]  Dupuytren's disease of palm with contracture [M72.0]     Physician: Aysha Nowak MD     Physician Orders: Specific Treatment Requested  Dupuytren's  Fasciectomy  DOS 5.16.24 Pt to be seen 3 - 5 days post-op for the following:  Fabricate a custom splint in the following positions:  A hand-based, full extension pan splint.  ()  If there is a PIP flexion contracture of 30 deg or more, fabricate a second splint to alternate in: An intrinsic plus splint with MPS in flexion to encourage full IP extension. (Fran Rosa Splint) ()  Pt to attend therapy 3 times per week for ROM, wound care, and scar management.  Suture removal by therapy at 7-10 days post op or 10-14 days for DMII. Teach Scar Massage.  Pt is to see physician at 2- 4 weeks post-op. Await further orders to continue therapy     Surgical Procedure and Date: 5/16/24,   1.  Left small finger partial Dupuytren's fasciectomy with PIP joint release, cpt 08325  2.  Left ring finger partial Dupuytren's fasciectomy with PIP joint release, cpt 88963  3.  Left long finger partial Dupuytren's fasciectomy with PIP joint release, cpt 16446  4.  Left thumb partial Dupuytren's fasciectomy with PIP joint release, cpt 98448     Evaluation Date: 5/22/2024     Plan of Care Certification Period: 8/22/24     Date of Return to MD: 6/2/24     Visit # / Visits authorized: 12/ 20  Insurance Authorization Period Expiration: 12/31/24     FOTO #1: 33%  FOTO #2:   FOTO #3:      Precautions:  Standard     Time In:11:15 am  Time  Out: 12:00 pm  Total Appointment Time (timed & untimed codes): 45 minutes      SUBJECTIVE     Pt reports: Gabbie reached out, but the splint is backordered.       She was compliant with home exercise program given last session.   Response to previous treatment:Increased ability to participate in HEP  Functional change: More supple scar tissue noted    Pain: 4/10  Location: left hand       OBJECTIVE     Edema. Measured in centimeters.    5/22/2024 7/9/2024          Proximal Wrist Crease 14.9 14.2   Proximal Palmar Crease  17 16.8   MCPs 17.2 `17.4         Hand ROM. Measured in degrees.    5/22/2024 7/9/2024 7/24/2024                   Index: MP  0/80 0/65 0/75              PIP     0/46 -20/80 -22/80              DIP 0/40 0/45 0/44              MARTIN  166 170 177 (+7)           Long:  MP 0/70 0/65 -45/60              PIP 0/70 0/67 0/74              DIP 0/60 0/45 0/54              MARTIN 200  177 143           Ring:   MP 0/68 0/65 -41/65              PIP 0/56 0/40 36/55              DIP 0/55 0/17 0/30              MARTIN  179 122 105           Small:  MP 0/36 0/47 0/55               PIP 13/42 -42/50 -48/70               DIP 0/10 0/10 0/15              MARTIN  75 65 95 (+30)           Thumb: MP 0/56 66 65                IP 0/53 50 46          Opposition To RF P1 of SF P1 of SF         Strength (Dyanmometer) and Pinch Strength (Pinch Gauge)  Measured in pounds and psi. Average of three trials.    5/22/2024 5/22/2024     Right  Left    Rung II TBA TBA   Key Pinch TBA TBA   3pt Pinch TBA TBA   2pt Pinch TBA TBA         Manual Muscle Testing:  SF - FDP, FDS EDM, ADM, ODM 3-/5        CMS Impairment/Limitation/Restriction for FOTO Survey     Therapist reviewed FOTO scores for Johana Wright on 5/22/2024.   FOTO documents entered into [employer] - see Media section.     Limitation Score: ___33___%      Goal: ___56___%  Category: Self Care           Treatment     Johana received the treatments listed below:     Supervised  modalities: Patient received paraffin bath to left hand(s) for 10 minutes to increase blood flow, circulation, pain management and for tissue elasticity prior to therex.      Manual therapy - 25 min   - PROM of digits with joint mobilizations grade I coupled with PROM and place and hold   - STM to dorsal forearm to work on extensor extrinsics  - STM to incision areas and intrinsics     Therapeutic Activities to improve functional performance for 10  minutes, including:  - blocking FDP, FDS, hook, wave, flat/full fist, ab/ad   - thumb flexion, blocking FPL, thumb opposition, ab/ad   - passive small finger flexion DIP, PIP, MP  - 10 reps each, 4-5 x daily   - objective measurements re-assessed this date.     Patient received NMES for muscle reeducation applied to volar forearm near FDS belly as well as in hand near ulnar intrinsics @ 24 pps, waveform = symmetric, cycle = 10 sec on time and 10 sec off time for 15 minutes total.      Patient Education and Home Exercises      Education provided:   - Cont HEP   - Progress towards goals  - Updated HEP    Written Home Exercises Provided: Patient instructed to cont prior HEP.  Exercises were reviewed and Johana was able to demonstrate them prior to the end of the session.  Johana demonstrated good  understanding of the HEP provided. See EMR under Patient Instructions for exercises provided during therapy sessions.       Assessment     Johana is progressing fairly well towards her goals and there are no updates to goals at this time. Pt prognosis is fair, she remains overally stiff , scar softening with interventions. Minimal contraction noted with NMES. Overall hard end feel on PIP joint of small finger. Recommend starting patient on dynasplint for digit composite flexion to assist with continued tissue elasticity and promoting composite fist.     Pt will continue to benefit from skilled outpatient occupational therapy to address the deficits listed in the problem list on  initial evaluation provide pt/family education and to maximize pt's level of independence in the home and community environment.     Pt's spiritual, cultural and educational needs considered and pt agreeable to plan of care and goals.    Anticipated barriers to occupational therapy: none     The following goals were discussed with the patient and patient is in agreement with them as to be addressed in the treatment plan.      Short Term Goals: (6 weeks):  1)  Patient to be IND with HEP and modalities for pain management  2)  Increase ROM 3-10 degrees to increase functional hand use for ADLs/work/leisure activities  3)   Decrease edema .1-.5mm to increase joint mobility /flexibility for functional hand use.   4)  Assess   and pinch and set goals accordingly      Long Term Goals :To be met by discharge (12 weeks)   1)  Increase  strength 2-8 lbs. For driving, lifting, carrying   2) Increase pinch strength 1-4 psi's for typing   3)   Increase ROM 11-20 degrees to increase functional hand use for ADLs/work/leisure activities  4) Patient to score at __50___% OR MORE (FSM)  on FOTO to demonstrate improved perception of functional left  hand  Use.     Plan   Certification Period/Plan of care expiration: 5/22/2024 to 8/22/24.       Petra Hinojosa OTR/L

## 2024-08-05 ENCOUNTER — CLINICAL SUPPORT (OUTPATIENT)
Dept: REHABILITATION | Facility: OTHER | Age: 59
End: 2024-08-05
Payer: COMMERCIAL

## 2024-08-05 DIAGNOSIS — M79.642 HAND PAIN, LEFT: Primary | ICD-10-CM

## 2024-08-05 DIAGNOSIS — M25.642 STIFFNESS OF LEFT HAND JOINT: ICD-10-CM

## 2024-08-05 DIAGNOSIS — R29.898 DECREASED GRIP STRENGTH OF LEFT HAND: ICD-10-CM

## 2024-08-05 DIAGNOSIS — M25.60 RANGE OF MOTION DEFICIT: ICD-10-CM

## 2024-08-05 PROCEDURE — 97018 PARAFFIN BATH THERAPY: CPT | Mod: PN

## 2024-08-05 PROCEDURE — 97530 THERAPEUTIC ACTIVITIES: CPT | Mod: PN

## 2024-08-05 PROCEDURE — 97140 MANUAL THERAPY 1/> REGIONS: CPT | Mod: PN

## 2024-08-07 ENCOUNTER — CLINICAL SUPPORT (OUTPATIENT)
Dept: REHABILITATION | Facility: OTHER | Age: 59
End: 2024-08-07
Payer: COMMERCIAL

## 2024-08-07 DIAGNOSIS — R29.898 DECREASED GRIP STRENGTH OF LEFT HAND: Primary | ICD-10-CM

## 2024-08-07 DIAGNOSIS — M25.60 RANGE OF MOTION DEFICIT: ICD-10-CM

## 2024-08-07 PROCEDURE — 97530 THERAPEUTIC ACTIVITIES: CPT | Mod: PN

## 2024-08-07 PROCEDURE — 97018 PARAFFIN BATH THERAPY: CPT | Mod: PN

## 2024-08-07 PROCEDURE — 97140 MANUAL THERAPY 1/> REGIONS: CPT | Mod: PN

## 2024-08-12 ENCOUNTER — CLINICAL SUPPORT (OUTPATIENT)
Dept: REHABILITATION | Facility: OTHER | Age: 59
End: 2024-08-12
Payer: COMMERCIAL

## 2024-08-12 DIAGNOSIS — R29.898 DECREASED GRIP STRENGTH OF LEFT HAND: ICD-10-CM

## 2024-08-12 DIAGNOSIS — M25.642 STIFFNESS OF LEFT HAND JOINT: ICD-10-CM

## 2024-08-12 DIAGNOSIS — M25.60 RANGE OF MOTION DEFICIT: ICD-10-CM

## 2024-08-12 DIAGNOSIS — M79.642 HAND PAIN, LEFT: Primary | ICD-10-CM

## 2024-08-12 PROCEDURE — L3921 HFO W/JOINT(S) CF: HCPCS | Mod: PN

## 2024-08-12 PROCEDURE — 97760 ORTHOTIC MGMT&TRAING 1ST ENC: CPT | Mod: PN

## 2024-08-12 NOTE — PROGRESS NOTES
[facility]  Occupational Therapy Treatment Note    Date: 8/12/2024  Name: Johana Wright  Clinic Number: [id number]    Therapy Diagnosis:   1. Hand pain, left        2. Stiffness of left hand joint        3. Range of motion deficit        4. Decreased  strength of left hand              Medical Diagnosis: L Dupuytren's fasciotomy MF, RF, SM and thumb, 5/16/24  ICD-10:  Dupuytren's contracture of left hand [M72.0]  Dupuytren's disease of palm with contracture [M72.0]     Physician: Aysha Nowak MD     Physician Orders: Specific Treatment Requested  Dupuytren's  Fasciectomy  DOS 5.16.24 Pt to be seen 3 - 5 days post-op for the following:  Fabricate a custom splint in the following positions:  A hand-based, full extension pan splint.  ()  If there is a PIP flexion contracture of 30 deg or more, fabricate a second splint to alternate in: An intrinsic plus splint with MPS in flexion to encourage full IP extension. (Fran Rosa Splint) ()  Pt to attend therapy 3 times per week for ROM, wound care, and scar management.  Suture removal by therapy at 7-10 days post op or 10-14 days for DMII. Teach Scar Massage.  Pt is to see physician at 2- 4 weeks post-op. Await further orders to continue therapy     Surgical Procedure and Date: 5/16/24,   1.  Left small finger partial Dupuytren's fasciectomy with PIP joint release, cpt 96065  2.  Left ring finger partial Dupuytren's fasciectomy with PIP joint release, cpt 55748  3.  Left long finger partial Dupuytren's fasciectomy with PIP joint release, cpt 90878  4.  Left thumb partial Dupuytren's fasciectomy with PIP joint release, cpt 84783     Evaluation Date: 5/22/2024  Plan of Care Certification Period: 8/22/24  Insurance Authorization Period Expiration: 12/31/24  Visit # / Visits authorized: 23 / 20 - pending more    FOTO #1: 33%  FOTO #2:   FOTO #3:      Precautions:  Standard     Time In: 1:15 pm  Time Out: 2:04 pm  Total  Appointment Time (timed & untimed codes): 49 minutes      SUBJECTIVE     Pt reports: She has been working hard at home using her splint, stretching and more. But she feels still very stiff.      She was compliant with home exercise program given last session.   Response to previous treatment:Increased ability to participate in HEP  Functional change: More supple scar tissue noted    Pain: 4/10  Location: left hand       OBJECTIVE     Edema. Measured in centimeters.    5/22/2024 7/9/2024          Proximal Wrist Crease 14.9 14.2   Proximal Palmar Crease  17 16.8   MCPs 17.2 17.4         Hand ROM. Measured in degrees.    5/22/2024 7/9/2024 7/24/2024                   Index: MP  0/80 0/65 0/75              PIP     0/46 -20/80 -22/80              DIP 0/40 0/45 0/44              MARTIN  166 170 177 (+7)           Long:  MP 0/70 0/65 -45/60              PIP 0/70 0/67 0/74              DIP 0/60 0/45 0/54              MARTIN 200  177 143           Ring:   MP 0/68 0/65 -41/65              PIP 0/56 0/40 36/55              DIP 0/55 0/17 0/30              MARTIN  179 122 105           Small:  MP 0/36 0/47 0/55               PIP 13/42 -42/50 -48/70               DIP 0/10 0/10 0/15              MARTIN  75 65 95 (+30)           Thumb: MP 0/56 66 65                IP 0/53 50 46          Opposition To RF P1 of SF P1 of SF     8/7/24  Middle finger PIP joint:  75 isolated  70 composite    Ring finger PIP joint:  58 isolated (63* passive by end of session)  48 composite    Small finger PIP joint:  60 isolated  60 composite      Strength (Dyanmometer) and Pinch Strength (Pinch Gauge)  Measured in pounds and psi. Average of three trials.    5/22/2024 5/22/2024     Right  Left    Rung II TBA TBA   Key Pinch TBA TBA   3pt Pinch TBA TBA   2pt Pinch TBA TBA         Manual Muscle Testing:  SF - FDP, FDS EDM, ADM, ODM 3-/5        CMS Impairment/Limitation/Restriction for FOTO Survey     Therapist reviewed FOTO scores for Johana Wright on  5/22/2024.   FOTO documents entered into [employer] - see Media section.     Limitation Score: ___33___%      Goal: ___56___%  Category: Self Care           Treatment     Johana received the treatments listed below:     Orthotic Fabrication,   - Fabricated static progressive orthosis for PIP flexion of digits 3-5 - wrist in neutral, MP in extension as able today, and sling with 90* line of pull for PIP flexion  - Instructed to wear up to 30 min, as tolerated, with multiple sessions throughout the day pending tissue response/tolerance to wear      Orthotic Fit and Training x 10 min  - Patient able to demonstrate independence with donning/doffing, cueing for placement of loop on middle phalanx  Instructed to monitor for skin redness/irritation or any pressure areas and contact for modification if irritation occurs  May remove for bathing or hygiene   Keep your orthosis away from any heat sources. Do not leave in your car.  Keep your orthosis away from pets.  You may clean your orthosis with cool water and soap or a mild cleanser.  Your orthosis may need adjustments due to changes in your medical condition (swelling, dressing size, additional surgery, etc.)  Monitor your skin color and integrity.  Do not try to adjust the orthosis on your own.       Not performed today:  Supervised modalities: Patient received paraffin bath to left hand(s) for 0 minutes to increase blood flow, circulation, pain management and for tissue elasticity prior to therex.        Johana received the following manual therapy techniques for 0 minutes:   - PROM isolated PIP and DIP flexion of each digit, hook fist   - IASTM about dorsal hand/digits  - STM to dorsal forearm to work on extensor extrinsics (NT)      Therapeutic Activities to improve functional performance for 0 minutes, including:  - Assessment ROM isolated vs composite of digits 3-5  - Applied KT EDF strip about dorsal digits 3-5, spiral band about middle and ring PIP medial  correction, KT donut hole MP dorsal boost for RF and SF  - blocking FDP, FDS, hook, wave, flat/full fist, ab/ad  (NT)  - thumb flexion, blocking FPL, thumb opposition, ab/ad  (NT)  - passive small finger flexion DIP, PIP, MP (NT)       Patient Education and Home Exercises      Education provided:   - Orthosis wear and care   - Progress towards goals    Written Home Exercises Provided: Patient instructed to cont prior HEP.  Exercises were reviewed and Johana was able to demonstrate them prior to the end of the session.  Johana demonstrated good  understanding of the HEP provided. See EMR under Patient Instructions for exercises provided during therapy sessions.       Assessment     Johana presents with decreased joint mobility most notable for PIP flexion of digits 3-5. She will greatly benefit from static progressive orthosis which was fabricated today. Pending tolerance and gains in PIP flexion, may require addition for combined hook fist/intrinsic stretch at future visits. Will progress as tolerated.    Johana is progressing fairly well towards her goals and there are no updates to goals at this time. Pt prognosis is Fair.     Pt will continue to benefit from skilled outpatient occupational therapy to address the deficits listed in the problem list on initial evaluation provide pt/family education and to maximize pt's level of independence in the home and community environment.     Pt's spiritual, cultural and educational needs considered and pt agreeable to plan of care and goals.    Anticipated barriers to occupational therapy: none     The following goals were discussed with the patient and patient is in agreement with them as to be addressed in the treatment plan.      Short Term Goals: (6 weeks):  1)  Patient to be IND with HEP and modalities for pain management  2)  Increase ROM 3-10 degrees to increase functional hand use for ADLs/work/leisure activities  3)   Decrease edema .1-.5mm to increase joint mobility  /flexibility for functional hand use.   4)  Assess   and pinch and set goals accordingly      Long Term Goals :To be met by discharge (12 weeks)   1)  Increase  strength 2-8 lbs. For driving, lifting, carrying   2) Increase pinch strength 1-4 psi's for typing   3)   Increase ROM 11-20 degrees to increase functional hand use for ADLs/work/leisure activities  4) Patient to score at __50___% OR MORE (FSM)  on FOTO to demonstrate improved perception of functional left  hand  Use.     Plan   Certification Period/Plan of care expiration: 5/22/2024 to 8/22/24.     Maribel Lucero OTR/L

## 2024-08-14 ENCOUNTER — OFFICE VISIT (OUTPATIENT)
Dept: ORTHOPEDICS | Facility: CLINIC | Age: 59
End: 2024-08-14
Payer: COMMERCIAL

## 2024-08-14 DIAGNOSIS — M72.0 DUPUYTREN'S CONTRACTURE OF LEFT HAND: Primary | ICD-10-CM

## 2024-08-14 PROCEDURE — 99999 PR PBB SHADOW E&M-EST. PATIENT-LVL III: CPT | Mod: PBBFAC,,, | Performed by: ORTHOPAEDIC SURGERY

## 2024-08-14 PROCEDURE — 1160F RVW MEDS BY RX/DR IN RCRD: CPT | Mod: CPTII,S$GLB,, | Performed by: ORTHOPAEDIC SURGERY

## 2024-08-14 PROCEDURE — 99024 POSTOP FOLLOW-UP VISIT: CPT | Mod: S$GLB,,, | Performed by: ORTHOPAEDIC SURGERY

## 2024-08-14 PROCEDURE — 3044F HG A1C LEVEL LT 7.0%: CPT | Mod: CPTII,S$GLB,, | Performed by: ORTHOPAEDIC SURGERY

## 2024-08-14 PROCEDURE — 1159F MED LIST DOCD IN RCRD: CPT | Mod: CPTII,S$GLB,, | Performed by: ORTHOPAEDIC SURGERY

## 2024-08-16 ENCOUNTER — CLINICAL SUPPORT (OUTPATIENT)
Dept: REHABILITATION | Facility: OTHER | Age: 59
End: 2024-08-16
Payer: COMMERCIAL

## 2024-08-16 DIAGNOSIS — R29.898 DECREASED GRIP STRENGTH OF LEFT HAND: ICD-10-CM

## 2024-08-16 DIAGNOSIS — M25.642 STIFFNESS OF LEFT HAND JOINT: ICD-10-CM

## 2024-08-16 DIAGNOSIS — M79.642 HAND PAIN, LEFT: Primary | ICD-10-CM

## 2024-08-16 DIAGNOSIS — M25.60 RANGE OF MOTION DEFICIT: ICD-10-CM

## 2024-08-16 PROCEDURE — L3808 WHFO, RIGID W/O JOINTS: HCPCS | Mod: PN

## 2024-08-16 NOTE — PROGRESS NOTES
[facility] OUTPATIENT THERAPY AND WELLNESS  Occupational Therapy Treatment Note    Date: 8/16/2024  Name: Johana Wright  Clinic Number: [id number]    Therapy Diagnosis:   1. Hand pain, left        2. Stiffness of left hand joint        3. Range of motion deficit        4. Decreased  strength of left hand              Medical Diagnosis: L Dupuytren's fasciotomy MF, RF, SM and thumb, 5/16/24  ICD-10:  Dupuytren's contracture of left hand [M72.0]  Dupuytren's disease of palm with contracture [M72.0]     Physician: Aysha Nowak MD     Physician Orders: Specific Treatment Requested  Dupuytren's  Fasciectomy  DOS 5.16.24 Pt to be seen 3 - 5 days post-op for the following:  Fabricate a custom splint in the following positions:  A hand-based, full extension pan splint.  ()  If there is a PIP flexion contracture of 30 deg or more, fabricate a second splint to alternate in: An intrinsic plus splint with MPS in flexion to encourage full IP extension. (Fran Rosa Splint) ()  Pt to attend therapy 3 times per week for ROM, wound care, and scar management.  Suture removal by therapy at 7-10 days post op or 10-14 days for DMII. Teach Scar Massage.  Pt is to see physician at 2- 4 weeks post-op. Await further orders to continue therapy     Surgical Procedure and Date: 5/16/24,   1.  Left small finger partial Dupuytren's fasciectomy with PIP joint release, cpt 33285  2.  Left ring finger partial Dupuytren's fasciectomy with PIP joint release, cpt 16391  3.  Left long finger partial Dupuytren's fasciectomy with PIP joint release, cpt 85133  4.  Left thumb partial Dupuytren's fasciectomy with PIP joint release, cpt 54813     Evaluation Date: 5/22/2024  Plan of Care Certification Period: 8/22/24  Insurance Authorization Period Expiration: 12/31/24  Visit # / Visits authorized: 23 / 20 - pending more    FOTO #1: 33%  FOTO #2:   FOTO #3:      Precautions:  Standard     Time In: 8:37 am  Time Out: 9:16 am  Total  Appointment Time (timed & untimed codes): 39 minutes      SUBJECTIVE     Pt reports: had follow up with MD who requests adjustment to nighttime orthosis    She was compliant with home exercise program given last session.   Response to previous treatment: tolerated well  Functional change: feels improvement in ROM with static progressive orthosis    Pain: 4/10  Location: left hand       OBJECTIVE     Edema. Measured in centimeters.    5/22/2024 7/9/2024          Proximal Wrist Crease 14.9 14.2   Proximal Palmar Crease  17 16.8   MCPs 17.2 17.4         Hand ROM. Measured in degrees.    5/22/2024 7/9/2024 7/24/2024                   Index: MP  0/80 0/65 0/75              PIP     0/46 -20/80 -22/80              DIP 0/40 0/45 0/44              MARTIN  166 170 177 (+7)           Long:  MP 0/70 0/65 -45/60              PIP 0/70 0/67 0/74              DIP 0/60 0/45 0/54              MARTIN 200  177 143           Ring:   MP 0/68 0/65 -41/65              PIP 0/56 0/40 36/55              DIP 0/55 0/17 0/30              MARTIN  179 122 105           Small:  MP 0/36 0/47 0/55               PIP 13/42 -42/50 -48/70               DIP 0/10 0/10 0/15              MARTIN  75 65 95 (+30)           Thumb: MP 0/56 66 65                IP 0/53 50 46          Opposition To RF P1 of SF P1 of SF     8/7/24  Middle finger PIP joint:  75 isolated  70 composite    Ring finger PIP joint:  58 isolated (63* passive by end of session)  48 composite    Small finger PIP joint:  60 isolated  60 composite      Strength (Dyanmometer) and Pinch Strength (Pinch Gauge)  Measured in pounds and psi. Average of three trials.    5/22/2024 5/22/2024     Right  Left    Rung II TBA TBA   Key Pinch TBA TBA   3pt Pinch TBA TBA   2pt Pinch TBA TBA         Manual Muscle Testing:  SF - FDP, FDS EDM, ADM, ODM 3-/5        CMS Impairment/Limitation/Restriction for FOTO Survey     Therapist reviewed FOTO scores for Johana Diaz [place] on 5/22/2024.   FOTO documents entered into  [employer] - see Media section.     Limitation Score: ___33___%      Goal: ___56___%  Category: Self Care           Treatment     Johana received the treatments listed below:     Orthotic Fabrication,   - Fabricated forearm based extension orthosis to include wrist in neutral, small and ring digits in full extension and index and middle fingers MP extension only (IP free)  - Patient able to demonstrate independence with donning/doffing, instructed to be worn at NIGHT    Instructed to monitor for skin redness/irritation or any pressure areas and contact for modification if irritation occurs  May remove for bathing or hygiene   Keep your orthosis away from any heat sources. Do not leave in your car.  Keep your orthosis away from pets.  You may clean your orthosis with cool water and soap or a mild cleanser.  Your orthosis may need adjustments due to changes in your medical condition (swelling, dressing size, additional surgery, etc.)  Monitor your skin color and integrity.  Do not try to adjust the orthosis on your own.       Not performed today:  Supervised modalities: Patient received paraffin bath to left hand(s) for 0 minutes to increase blood flow, circulation, pain management and for tissue elasticity prior to therex.        Johana received the following manual therapy techniques for 0 minutes:   - PROM isolated PIP and DIP flexion of each digit, hook fist   - IASTM about dorsal hand/digits  - STM to dorsal forearm to work on extensor extrinsics (NT)      Therapeutic Activities to improve functional performance for 0 minutes, including:  - Assessment ROM isolated vs composite of digits 3-5  - Applied KT EDF strip about dorsal digits 3-5, spiral band about middle and ring PIP medial correction, KT donut hole MP dorsal boost for RF and SF  - blocking FDP, FDS, hook, wave, flat/full fist, ab/ad  (NT)  - thumb flexion, blocking FPL, thumb opposition, ab/ad  (NT)  - passive small finger flexion DIP, PIP, MP (NT)        Patient Education and Home Exercises      Education provided:   - Orthosis wear and care   - Progress towards goals    Written Home Exercises Provided: Patient instructed to cont prior HEP.  Exercises were reviewed and Johana was able to demonstrate them prior to the end of the session.  Johana demonstrated good  understanding of the HEP provided. See EMR under Patient Instructions for exercises provided during therapy sessions.       Assessment     Johana presents after follow up with MD, requesting nighttime orthosis that maintains extension as night. Fabricated this session and patient demonstrates independence with donning/doffing. She understands wear and care, to be worn at night. Continue with static progressive during the day. Will progress as tolerated.    Johana is progressing fairly well towards her goals and there are no updates to goals at this time. Pt prognosis is Fair.     Pt will continue to benefit from skilled outpatient occupational therapy to address the deficits listed in the problem list on initial evaluation provide pt/family education and to maximize pt's level of independence in the home and community environment.     Pt's spiritual, cultural and educational needs considered and pt agreeable to plan of care and goals.    Anticipated barriers to occupational therapy: none     The following goals were discussed with the patient and patient is in agreement with them as to be addressed in the treatment plan.      Short Term Goals: (6 weeks):  1)  Patient to be IND with HEP and modalities for pain management  2)  Increase ROM 3-10 degrees to increase functional hand use for ADLs/work/leisure activities  3)   Decrease edema .1-.5mm to increase joint mobility /flexibility for functional hand use.   4)  Assess   and pinch and set goals accordingly      Long Term Goals :To be met by discharge (12 weeks)   1)  Increase  strength 2-8 lbs. For driving, lifting, carrying   2) Increase pinch  strength 1-4 psi's for typing   3)   Increase ROM 11-20 degrees to increase functional hand use for ADLs/work/leisure activities  4) Patient to score at __50___% OR MORE (FSM)  on FOTO to demonstrate improved perception of functional left  hand  Use.     Plan   Certification Period/Plan of care expiration: 5/22/2024 to 8/22/24.     Maribel Lucero, OTR/L

## 2024-08-19 ENCOUNTER — CLINICAL SUPPORT (OUTPATIENT)
Dept: REHABILITATION | Facility: OTHER | Age: 59
End: 2024-08-19
Payer: COMMERCIAL

## 2024-08-19 DIAGNOSIS — M79.642 HAND PAIN, LEFT: Primary | ICD-10-CM

## 2024-08-19 DIAGNOSIS — R29.898 DECREASED GRIP STRENGTH OF LEFT HAND: ICD-10-CM

## 2024-08-19 DIAGNOSIS — M25.642 STIFFNESS OF LEFT HAND JOINT: ICD-10-CM

## 2024-08-19 DIAGNOSIS — M25.60 RANGE OF MOTION DEFICIT: ICD-10-CM

## 2024-08-19 PROCEDURE — 97763 ORTHC/PROSTC MGMT SBSQ ENC: CPT | Mod: PN

## 2024-08-19 PROCEDURE — 97530 THERAPEUTIC ACTIVITIES: CPT | Mod: PN

## 2024-08-19 NOTE — PROGRESS NOTES
[facility] OUTPATIENT THERAPY AND WELLNESS  Occupational Therapy UPDATED PLAN OF CARE    Date: 8/19/2024  Name: Johana Wright  Clinic Number: [id number]    Therapy Diagnosis:   1. Hand pain, left        2. Stiffness of left hand joint        3. Range of motion deficit        4. Decreased  strength of left hand            Medical Diagnosis: L Dupuytren's fasciotomy MF, RF, SM and thumb, 5/16/24  ICD-10:  Dupuytren's contracture of left hand [M72.0]  Dupuytren's disease of palm with contracture [M72.0]     Physician: Aysha Nowak MD     Physician Orders: Specific Treatment Requested  Dupuytren's  Fasciectomy  DOS 5.16.24 Pt to be seen 3 - 5 days post-op for the following:  Fabricate a custom splint in the following positions:  A hand-based, full extension pan splint.  ()  If there is a PIP flexion contracture of 30 deg or more, fabricate a second splint to alternate in: An intrinsic plus splint with MPS in flexion to encourage full IP extension. (Fran Rosa Splint) ()  Pt to attend therapy 3 times per week for ROM, wound care, and scar management.  Suture removal by therapy at 7-10 days post op or 10-14 days for DMII. Teach Scar Massage.  Pt is to see physician at 2- 4 weeks post-op. Await further orders to continue therapy     Surgical Procedure and Date: 5/16/24,   1.  Left small finger partial Dupuytren's fasciectomy with PIP joint release, cpt 73002  2.  Left ring finger partial Dupuytren's fasciectomy with PIP joint release, cpt 29381  3.  Left long finger partial Dupuytren's fasciectomy with PIP joint release, cpt 28893  4.  Left thumb partial Dupuytren's fasciectomy with PIP joint release, cpt 42289     Evaluation Date: 5/22/2024  Re-evaluation: 8/19/24  Plan of Care Certification Period: 8/22/24  Updated POC: 11/11/24  Insurance Authorization Period Expiration: 12/31/24  Visit # / Visits authorized: 27 / 32    FOTO #1: 33%  FOTO #2:   FOTO #3:      Precautions:  Standard     Time In: 1:18  pm  Time Out: 2:00 pm  Total Appointment Time (timed & untimed codes): 43 minutes      SUBJECTIVE     Pt reports: tolerating nighttime orthosis and daytime static progressive fairly well    She was compliant with home exercise program given last session.   Response to previous treatment: tolerated well  Functional change: feels improvement in ROM with static progressive orthosis    Pain: 4/10  Location: left hand       OBJECTIVE     Edema. Measured in centimeters.    5/22/2024 7/9/2024          Proximal Wrist Crease 14.9 14.2   Proximal Palmar Crease  17 16.8   MCPs 17.2 17.4         Hand ROM. Measured in degrees.    5/22/2024 7/9/2024 7/24/2024 8/19/24         Left            Index: MP  0/80 0/65 0/75 -12/75              PIP     0/46 -20/80 -22/80 -20/80              DIP 0/40 0/45 0/44 0/55              MARTIN  166 170 177 178            Long:  MP 0/70 0/65 -45/60 -30/75              PIP 0/70 0/67 0/74 -16/70              DIP 0/60 0/45 0/54 0/60              MARTIN 200  177 143 159            Ring:   MP 0/68 0/65 -41/65 -35/75              PIP 0/56 0/40 36/55 0/44              DIP 0/55 0/17 0/30 0/23              MARTIN  179 122 105 107            Small:  MP 0/36 0/47 0/55 -15/42               PIP 13/42 -42/50 -48/70 -36/67               DIP 0/10 0/10 0/15 0/5              MARTIN  75 65 95 (+30) 63            Thumb: MP 0/56 66 65                 IP 0/53 50 46           Opposition To RF P1 of SF P1 of SF      8/7/24  Middle finger PIP joint:  75 isolated  70 composite    Ring finger PIP joint:  58 isolated (63* passive by end of session)  48 composite    Small finger PIP joint:  60 isolated  60 composite     STRENGTH: (Measured in pounds using a Dynamometer and pinch meter)   Right  8/19/24 Left  8/19/24    Setting 2 20, 30, 25 5, 5, 5    Average 25 lb 5 lb          Manual Muscle Testing:  SF - FDP, FDS EDM, ADM, ODM 3-/5        CMS Impairment/Limitation/Restriction for FOTO Survey     Therapist reviewed FOTO scores for  Johana Diaz [place] on 5/22/2024.   FOTO documents entered into [facility] - see Media section.     Limitation Score: ___33___%      Goal: ___56___%  Category: Self Care           Treatment     Johana received the treatments listed below:     Therapeutic Activities to improve functional performance for 25 minutes, including:  - Reassessment ROM,  - updated POC  - Orange theraputty , pulls, flexion scrapes - added to HEP  - Applied KT EDF strip about dorsal digits 3-5, spiral band about middle and ring PIP medial correction, KT donut hole MP dorsal boost for RF and SF (NT)  - blocking FDP, FDS, hook, wave, flat/full fist, ab/ad  (NT)      Orthotic Modification x 18 min  - Orthosis modification to daytime static MP block orthosis - modified to circumferential to maintain MP extension while direction flexion force to the PIP and DIP joints for activation of extrinsic flexors  - Instructed to wear this static orthosis with functional use, and continue with static progressive 3x/day for LLPS of PIP joint flexion      Not performed today:  Supervised modalities: Patient received paraffin bath to left hand(s) for 0 minutes to increase blood flow, circulation, pain management and for tissue elasticity prior to therex.        Johana received the following manual therapy techniques for 0 minutes:   - PROM isolated PIP and DIP flexion of each digit, hook fist   - IASTM about dorsal hand/digits  - STM to dorsal forearm to work on extensor extrinsics (NT)       Patient Education and Home Exercises      Education provided:   - Orthosis wear and care   - Progress towards goals    Written Home Exercises Provided: Patient instructed to cont prior HEP.  Exercises were reviewed and Johana was able to demonstrate them prior to the end of the session.  Johana demonstrated good  understanding of the HEP provided. See EMR under Patient Instructions for exercises provided during therapy sessions.       Assessment     Johana demonstrates  significantly limited ROM and  strength of Left compared to right hand. Recently upgraded HEP with static progressive orthosis to facilitate IP flexion and plan to monitor progress. Modified daytime orthosis today for circumferential design to block MP flexion and redirect force for activation of extrinsic digit flexors. Scar remains thickened in palm. She will greatly benefit from continued skilled OT services to progress with scar management, ROM, strength/coordination, orthosis fabrication, and functional use of left hand.     Johana is making slow but steady progress towards her goals and there are no updates to goals at this time. Pt prognosis is Fair.     Pt will continue to benefit from skilled outpatient occupational therapy to address the deficits listed in the problem list on initial evaluation provide pt/family education and to maximize pt's level of independence in the home and community environment.     Pt's spiritual, cultural and educational needs considered and pt agreeable to plan of care and goals.    Anticipated barriers to occupational therapy: PMH/mechanism of injury/condition    The following goals were discussed with the patient and patient is in agreement with them as to be addressed in the treatment plan.      Short Term Goals: (6 weeks):  1)  Patient to be IND with HEP and modalities for pain management - Met, 8/19/24  2)  Increase ROM 3-10 degrees to increase functional hand use for ADLs/work/leisure activities  3)   Decrease edema .1-.5mm to increase joint mobility /flexibility for functional hand use.   4)  Assess   and pinch and set goals accordingly  - Met, 8/19/24     Long Term Goals :To be met by discharge (12 weeks)   1)  Increase  strength 2-8 lbs. For driving, lifting, carrying   2) Increase pinch strength 1-4 psi's for typing   3)   Increase ROM 11-20 degrees to increase functional hand use for ADLs/work/leisure activities  4) Patient to score at __50___% OR MORE (FSM)   on FOTO to demonstrate improved perception of functional left  hand  Use.     Plan     Pt to be treated by Occupational Therapy 2 times per week for 12 weeks during the certification period from 8/19/2024 to 11/11/24 to achieve the established goals.     Treatment to include: Paraffin, Fluidotherapy, Manual therapy/joint mobilizations, Modalities for pain management, US 3 mhz, Therapeutic exercises/activities., Iontophoresis with 2.0 cc Dexamethasone, Strengthening, Orthotic Fabrication/Fit/Training, Edema Control, Scar Management, Electrical Modalities, Joint Protection, and Energy Conservation, as well as any other treatments deemed necessary based on the patient's needs or progress.      Maribel Lucero, OTR/L

## 2024-08-21 ENCOUNTER — CLINICAL SUPPORT (OUTPATIENT)
Dept: REHABILITATION | Facility: OTHER | Age: 59
End: 2024-08-21
Payer: COMMERCIAL

## 2024-08-21 DIAGNOSIS — M25.60 RANGE OF MOTION DEFICIT: ICD-10-CM

## 2024-08-21 DIAGNOSIS — M79.642 HAND PAIN, LEFT: Primary | ICD-10-CM

## 2024-08-21 DIAGNOSIS — R29.898 DECREASED GRIP STRENGTH OF LEFT HAND: ICD-10-CM

## 2024-08-21 DIAGNOSIS — M25.642 STIFFNESS OF LEFT HAND JOINT: ICD-10-CM

## 2024-08-21 PROCEDURE — 97763 ORTHC/PROSTC MGMT SBSQ ENC: CPT | Mod: PN

## 2024-08-21 PROCEDURE — 97140 MANUAL THERAPY 1/> REGIONS: CPT | Mod: PN

## 2024-08-21 PROCEDURE — 97110 THERAPEUTIC EXERCISES: CPT | Mod: PN

## 2024-08-21 NOTE — PROGRESS NOTES
[facility]  Occupational Therapy Daily Treatment Note    Date: 8/21/2024  Name: Johana Wright  Clinic Number: [id number]    Therapy Diagnosis:   1. Hand pain, left        2. Stiffness of left hand joint        3. Range of motion deficit        4. Decreased  strength of left hand            Medical Diagnosis: L Dupuytren's fasciotomy MF, RF, SM and thumb, 5/16/24  ICD-10:  Dupuytren's contracture of left hand [M72.0]  Dupuytren's disease of palm with contracture [M72.0]     Physician: Aysha Nowak MD     Physician Orders: Specific Treatment Requested  Dupuytren's Fasciectomy  DOS 5.16.24 Pt to be seen 3 - 5 days post-op for the following:  Fabricate a custom splint in the following positions:  A hand-based, full extension pan splint.  ()  If there is a PIP flexion contracture of 30 deg or more, fabricate a second splint to alternate in: An intrinsic plus splint with MPS in flexion to encourage full IP extension. (Fran Rosa Splint) ()  Pt to attend therapy 3 times per week for ROM, wound care, and scar management.  Suture removal by therapy at 7-10 days post op or 10-14 days for DMII. Teach Scar Massage.  Pt is to see physician at 2- 4 weeks post-op. Await further orders to continue therapy     Surgical Procedure and Date: 5/16/24  1.  Left small finger partial Dupuytren's fasciectomy with PIP joint release, cpt 91367  2.  Left ring finger partial Dupuytren's fasciectomy with PIP joint release, cpt 41742  3.  Left long finger partial Dupuytren's fasciectomy with PIP joint release, cpt 20280  4.  Left thumb partial Dupuytren's fasciectomy with PIP joint release, cpt 41861     Evaluation Date: 5/22/2024  Re-evaluation: 8/19/24  Plan of Care Certification Period: 8/22/24  Updated POC: 11/11/24  Insurance Authorization Period Expiration: 12/31/24  Visit # / Visits authorized: 27 / 32    FOTO #1: 33%  FOTO #2:   FOTO #3:      Precautions:  Standard     Time In: 1:15  pm  Time Out: 2:00 pm  Total Appointment Time (timed & untimed codes): 45 minutes      SUBJECTIVE     Pt reports: tolerating nighttime orthosis and daytime static progressive fairly well    She was compliant with home exercise program given last session.   Response to previous treatment: tolerated well  Functional change: feels improvement in ROM with static progressive orthosis    Pain: 4/10  Location: left hand       OBJECTIVE     Edema. Measured in centimeters.    5/22/2024 7/9/2024          Proximal Wrist Crease 14.9 14.2   Proximal Palmar Crease  17 16.8   MCPs 17.2 17.4         Hand ROM. Measured in degrees.    5/22/2024 7/9/2024 7/24/2024 8/19/24         Left            Index: MP  0/80 0/65 0/75 -12/75              PIP     0/46 -20/80 -22/80 -20/80              DIP 0/40 0/45 0/44 0/55              MARTIN  166 170 177 178            Long:  MP 0/70 0/65 -45/60 -30/75              PIP 0/70 0/67 0/74 -16/70              DIP 0/60 0/45 0/54 0/60              MARTIN 200  177 143 159            Ring:   MP 0/68 0/65 -41/65 -35/75              PIP 0/56 0/40 36/55 0/44              DIP 0/55 0/17 0/30 0/23              MARTIN  179 122 105 107            Small:  MP 0/36 0/47 0/55 -15/42               PIP 13/42 -42/50 -48/70 -36/67               DIP 0/10 0/10 0/15 0/5              MARTIN  75 65 95 (+30) 63            Thumb: MP 0/56 66 65                 IP 0/53 50 46           Opposition To RF P1 of SF P1 of SF      8/7/24  Middle finger PIP joint:  75 isolated  70 composite    Ring finger PIP joint:  58 isolated (63* passive by end of session)  48 composite    Small finger PIP joint:  60 isolated  60 composite     STRENGTH: (Measured in pounds using a Dynamometer and pinch meter)   Right  8/19/24 Left  8/19/24    Setting 2 20, 30, 25 5, 5, 5    Average 25 lb 5 lb          Manual Muscle Testing:  SF - FDP, FDS EDM, ADM, ODM 3-/5        CMS Impairment/Limitation/Restriction for FOTO Survey     Therapist reviewed FOTO scores for  Johana Wright on 5/22/2024.   FOTO documents entered into [employer] - see Media section.     Limitation Score: ___33___%      Goal: ___56___%  Category: Self Care           Treatment     Johana received the treatments listed below:       Supervised modalities: Patient received paraffin bath to left hand(s) for 10 minutes to increase blood flow, circulation, pain management and for tissue elasticity prior to therex.  Coban wrap for LLPS into full fist (TE x 10)      Johana received the following manual therapy techniques for 10 minutes:   - PROM isolated PIP and DIP flexion of each digit, hook fist   - IASTM about dorsal hand/digits  - STM to dorsal forearm to work on extensor extrinsics (NT)      Johana performed therapeutic exercises for 13 minutes including:  - LLPS into full fist with paraffin (10 min)  - Elastomer mold for palmar scar, secured into nighttime orthosis  - Orange theraputty , pulls, flexion scrapes (NT)  - Applied KT EDF strip about dorsal digits 3-5, spiral band about middle and ring PIP medial correction, KT donut hole MP dorsal boost for RF and SF (NT)  - blocking FDP, FDS, hook, wave, flat/full fist, ab/ad  (NT)      Orthotic Modification x 22 min  - Per MD secure chat, modified orthosis to   - Instructed to wear this static orthosis with functional use, and continue with static progressive 3x/day for LLPS of PIP joint flexion      Patient Education and Home Exercises      Education provided:   - Orthosis wear and care   - Progress towards goals    Written Home Exercises Provided: Patient instructed to cont prior HEP.  Exercises were reviewed and Johana was able to demonstrate them prior to the end of the session.  Johana demonstrated good  understanding of the HEP provided. See EMR under Patient Instructions for exercises provided during therapy sessions.       Assessment     Johana demonstrates significantly limited digit ROM and  strength of Left compared to right hand. She has been  wearing static MP block orthosis during the day to facilitate activation of extrinsic flexors as well as intrinsic lengthening. Tolerating static progressive for PIP flexion well. Modified nighttime orthosis to allow ring finger PIP flexion while maintaining MP extension of digits 2-4, and full extension of small finger. She will greatly benefit from continued skilled OT services to progress with scar management, ROM, strength/coordination, orthosis fabrication, and functional use of left hand.     Johana is making slow but steady progress towards her goals and there are no updates to goals at this time. Pt prognosis is Fair.     Pt will continue to benefit from skilled outpatient occupational therapy to address the deficits listed in the problem list on initial evaluation provide pt/family education and to maximize pt's level of independence in the home and community environment.     Pt's spiritual, cultural and educational needs considered and pt agreeable to plan of care and goals.    Anticipated barriers to occupational therapy: PMH/mechanism of injury/condition    The following goals were discussed with the patient and patient is in agreement with them as to be addressed in the treatment plan.      Short Term Goals: (6 weeks):  1)  Patient to be IND with HEP and modalities for pain management - Met, 8/19/24  2)  Increase ROM 3-10 degrees to increase functional hand use for ADLs/work/leisure activities  3)   Decrease edema .1-.5mm to increase joint mobility /flexibility for functional hand use.   4)  Assess   and pinch and set goals accordingly  - Met, 8/19/24     Long Term Goals :To be met by discharge (12 weeks)   1)  Increase  strength 2-8 lbs. For driving, lifting, carrying   2) Increase pinch strength 1-4 psi's for typing   3)   Increase ROM 11-20 degrees to increase functional hand use for ADLs/work/leisure activities  4) Patient to score at __50___% OR MORE (FSM)  on FOTO to demonstrate improved  perception of functional left  hand  Use.     Plan     Pt to be treated by Occupational Therapy 2 times per week for 12 weeks during the certification period from 8/19/2024 to 11/11/24 to achieve the established goals.     Treatment to include: Paraffin, Fluidotherapy, Manual therapy/joint mobilizations, Modalities for pain management, US 3 mhz, Therapeutic exercises/activities., Iontophoresis with 2.0 cc Dexamethasone, Strengthening, Orthotic Fabrication/Fit/Training, Edema Control, Scar Management, Electrical Modalities, Joint Protection, and Energy Conservation, as well as any other treatments deemed necessary based on the patient's needs or progress.      Maribel Lucero, OTR/L

## 2024-09-06 NOTE — PROGRESS NOTES
Johana Wright presents for post-operative evaluation of   Encounter Diagnosis   Name Primary?    Dupuytren's contracture of left hand Yes   The patient is now 12 weeks s/p left small finger, ring finger, long finger and thumb partial Dupuytren's fasciectomy with PIP and MP joint releases. She has been working diligently in therapy and wearing her flexion splint. She still has some significant stiffness in the left small finger.    Vitals:    08/14/24 0938   PainSc:   3   PainLoc: Hand       PE:    AA&O x 4.  NAD  HEENT:  NCAT, sclera nonicteric  Lungs:  Respirations are equal and unlabored.  CV:  2+ bilateral upper and lower extremity pulses.  MSK: The incisions are well healed.  Near full extension of the left small ring and long fingers, stiffness with flexion of the fingers, especially small finger PIP joint.  Neurovascularly intact and has 5/5 thenar and intrinsic musculature strength.      Diagnostic studies and other clinical records review:      Assessment & Plan: Status post above, doing well    Continue with range of motion and strengthening; flexion splint wear, adjust night splint  F/U 6 weeks  Call with any questions/concerns in the interim        Aysha Nowak MD    Please be aware that this note has been generated with the assistance of [employer] voice-to-text.  Please excuse any spelling or grammatical errors.    This note was generated with the assistance of ambient listening technology. Verbal consent was obtained by the patient and accompanying visitor(s) for the recording of patient appointment to facilitate this note. I attest to having reviewed and edited the generated note for accuracy, though some syntax or spelling errors may persist. Please contact the author of this note for any clarification.

## 2024-09-07 ENCOUNTER — PATIENT MESSAGE (OUTPATIENT)
Dept: REHABILITATION | Facility: OTHER | Age: 59
End: 2024-09-07
Payer: COMMERCIAL

## 2024-09-19 ENCOUNTER — PATIENT MESSAGE (OUTPATIENT)
Dept: PRIMARY CARE CLINIC | Facility: CLINIC | Age: 59
End: 2024-09-19
Payer: COMMERCIAL

## 2024-09-25 ENCOUNTER — OFFICE VISIT (OUTPATIENT)
Dept: ORTHOPEDICS | Facility: CLINIC | Age: 59
End: 2024-09-25
Payer: COMMERCIAL

## 2024-09-25 VITALS — BODY MASS INDEX: 19.52 KG/M2 | WEIGHT: 106.06 LBS | HEIGHT: 62 IN

## 2024-09-25 DIAGNOSIS — M72.0 DUPUYTREN'S CONTRACTURE OF LEFT HAND: Primary | ICD-10-CM

## 2024-09-25 DIAGNOSIS — M25.642 FINGER STIFFNESS, LEFT: ICD-10-CM

## 2024-09-25 PROCEDURE — 1159F MED LIST DOCD IN RCRD: CPT | Mod: CPTII,S$GLB,, | Performed by: ORTHOPAEDIC SURGERY

## 2024-09-25 PROCEDURE — 3008F BODY MASS INDEX DOCD: CPT | Mod: CPTII,S$GLB,, | Performed by: ORTHOPAEDIC SURGERY

## 2024-09-25 PROCEDURE — 99214 OFFICE O/P EST MOD 30 MIN: CPT | Mod: S$GLB,,, | Performed by: ORTHOPAEDIC SURGERY

## 2024-09-25 PROCEDURE — 3044F HG A1C LEVEL LT 7.0%: CPT | Mod: CPTII,S$GLB,, | Performed by: ORTHOPAEDIC SURGERY

## 2024-09-25 PROCEDURE — 1160F RVW MEDS BY RX/DR IN RCRD: CPT | Mod: CPTII,S$GLB,, | Performed by: ORTHOPAEDIC SURGERY

## 2024-09-25 PROCEDURE — 99999 PR PBB SHADOW E&M-EST. PATIENT-LVL III: CPT | Mod: PBBFAC,,, | Performed by: ORTHOPAEDIC SURGERY

## 2024-09-25 NOTE — PROGRESS NOTES
Johana Wright presents for post-operative evaluation of   Encounter Diagnosis   Name Primary?    Dupuytren's contracture of left hand Yes   The patient is now 4 months s/p left small finger, ring finger, long finger and thumb partial Dupuytren's fasciectomy with PIP and MP joint releases. She has been working diligently in therapy and wearing her dynaplint splint. She still has some significant stiffness in the left ring/small finger.    Vitals:    09/25/24 0958   PainSc: 0-No pain       PE:    AA&O x 4.  NAD  HEENT:  NCAT, sclera nonicteric  Lungs:  Respirations are equal and unlabored.  CV:  2+ bilateral upper and lower extremity pulses.  MSK: The incisions are well healed.  Near full extension of the left small ring and long fingers, stiffness with flexion of the fingers, especially small and ring finger PIP joints.  Neurovascularly intact and has 5/5 thenar and intrinsic musculature strength.      Diagnostic studies and other clinical records review:      Assessment & Plan: Status post above, doing well    Continue with range of motion and strengthening; dynaplint splint wear, discontinue night splint, discussed possible PIP capsulotomy at 6 months if continued stiffness  F/U 8 weeks  Call with any questions/concerns in the interim        Aysha Nowak MD    Please be aware that this note has been generated with the assistance of txtr voice-to-text.  Please excuse any spelling or grammatical errors.    This note was generated with the assistance of ambient listening technology. Verbal consent was obtained by the patient and accompanying visitor(s) for the recording of patient appointment to facilitate this note. I attest to having reviewed and edited the generated note for accuracy, though some syntax or spelling errors may persist. Please contact the author of this note for any clarification.

## 2024-09-26 ENCOUNTER — PATIENT MESSAGE (OUTPATIENT)
Dept: ORTHOPEDICS | Facility: CLINIC | Age: 59
End: 2024-09-26
Payer: COMMERCIAL

## 2024-09-30 ENCOUNTER — CLINICAL SUPPORT (OUTPATIENT)
Dept: REHABILITATION | Facility: HOSPITAL | Age: 59
End: 2024-09-30
Attending: ORTHOPAEDIC SURGERY
Payer: COMMERCIAL

## 2024-09-30 DIAGNOSIS — M25.642 STIFFNESS OF LEFT HAND JOINT: ICD-10-CM

## 2024-09-30 DIAGNOSIS — M72.0 DUPUYTREN'S CONTRACTURE OF LEFT HAND: ICD-10-CM

## 2024-09-30 DIAGNOSIS — R29.898 DECREASED GRIP STRENGTH OF LEFT HAND: ICD-10-CM

## 2024-09-30 DIAGNOSIS — M79.642 HAND PAIN, LEFT: Primary | ICD-10-CM

## 2024-09-30 PROCEDURE — 97140 MANUAL THERAPY 1/> REGIONS: CPT

## 2024-09-30 PROCEDURE — 97530 THERAPEUTIC ACTIVITIES: CPT

## 2024-09-30 PROCEDURE — 97168 OT RE-EVAL EST PLAN CARE: CPT

## 2024-09-30 NOTE — PROGRESS NOTES
[facility]  Occupational Therapy Daily Treatment Note    Date: 9/30/2024  Name: Johana Wright  Clinic Number: [id number]    Therapy Diagnosis:   1. Hand pain, left        2. Dupuytren's contracture of left hand  Ambulatory referral/consult to Physical/Occupational Therapy      3. Stiffness of left hand joint        4. Decreased  strength of left hand            Medical Diagnosis: L Dupuytren's fasciotomy MF, RF, SM and thumb, 5/16/24    ICD-10:  Dupuytren's contracture of left hand [M72.0]  Dupuytren's disease of palm with contracture [M72.0]     Physician: Aysha Nowak MD     Physician Orders: Specific Treatment Requested  Dupuytren's Fasciectomy  DOS 5.16.24 Pt to be seen 3 - 5 days post-op for the following:  Fabricate a custom splint in the following positions:  A hand-based, full extension pan splint.  ()  If there is a PIP flexion contracture of 30 deg or more, fabricate a second splint to alternate in: An intrinsic plus splint with MPS in flexion to encourage full IP extension. (Fran Rosa Splint) ()  Pt to attend therapy 3 times per week for ROM, wound care, and scar management.  Suture removal by therapy at 7-10 days post op or 10-14 days for DMII. Teach Scar Massage.  Pt is to see physician at 2- 4 weeks post-op. Await further orders to continue therapy     Surgical Procedure and Date: 5/16/24  1.  Left small finger partial Dupuytren's fasciectomy with PIP joint release, cpt 62187  2.  Left ring finger partial Dupuytren's fasciectomy with PIP joint release, cpt 16831  3.  Left long finger partial Dupuytren's fasciectomy with PIP joint release, cpt 30171  4.  Left thumb partial Dupuytren's fasciectomy with PIP joint release, cpt 24537     Evaluation Date: 5/22/2024  Re-evaluation: 8/19/24  Plan of Care Certification Period: 8/22/24  Updated POC: 12/30/24  Insurance Authorization Period Expiration: 12/31/24  Visit # / Visits authorized: 27 / 32    FOTO #1:  33%  FOTO #2:   FOTO #3:      Precautions:  Standard     Time In: 1:30 pm  Time Out: 2:15 pm  Total Appointment Time (timed & untimed codes): 45 minutes      SUBJECTIVE     Pt reports: Returning to [facility] for aggressive therapy, then MD may consider procedure ; Pain 3/10 on average, 7/10 with PROM ; hand warm to touch.     She was compliant with home exercise program given last session.   Response to previous treatment: tolerated well  Functional change: feels improvement in ROM with static progressive orthosis    Pain: 4/10  Location: left hand       OBJECTIVE     Edema. Measured in centimeters.    5/22/2024 7/9/2024          Proximal Wrist Crease 14.9 14.2   Proximal Palmar Crease  17 16.8   MCPs 17.2 17.4         Hand ROM. Measured in degrees.    5/22/2024 7/9/2024 7/24/2024 8/19/24 9/30/24         Left Left  Transfer to [facility]             Index: MP  0/80 0/65 0/75 -12/75 20/60              PIP     0/46 -20/80 -22/80 -20/80 24/80              DIP 0/40 0/45 0/44 0/55 0/60              MARTIN  166 170 177 178 156             Long:  MP 0/70 0/65 -45/60 -30/75 25/63              PIP 0/70 0/67 0/74 -16/70 20/70              DIP 0/60 0/45 0/54 0/60 0/65              MARTIN 200  177 143 159 153             Ring:   MP 0/68 0/65 -41/65 -35/75 25/60              PIP 0/56 0/40 36/55 0/44 0/52              DIP 0/55 0/17 0/30 0/23 0/40              MARTIN  179 122 105 107 127             Small:  MP 0/36 0/47 0/55 -15/42 6/40               PIP 13/42 -42/50 -48/70 -36/67 40/67               DIP 0/10 0/10 0/15 0/5 0/0              MARTIN  75 65 95 (+30) 63 61             Thumb: MP 0/56 66 65                  IP 0/53 50 46            Opposition To RF P1 of SF P1 of SF       8/7/24  Middle finger PIP joint:  75 isolated  70 composite    Ring finger PIP joint:  58 isolated (63* passive by end of session)  48 composite    Small finger PIP joint:  60 isolated  60 composite     STRENGTH: (Measured in pounds using a Dynamometer and pinch  meter)   Right  8/19/24 Left  8/19/24    Setting 2 20, 30, 25 5, 5, 5    Average 25 lb 5 lb          Manual Muscle Testing:  SF - FDP, FDS EDM, ADM, ODM 3-/5        CMS Impairment/Limitation/Restriction for FOTO Survey     Therapist reviewed FOTO scores for Johana Wright on 5/22/2024.   FOTO documents entered into [employer] - see Media section.     Limitation Score: ___33___%      Goal: ___56___%  Category: Self Care           Treatment     Johana received the treatments listed below:       Supervised modalities: Patient received paraffin bath to left hand(s) for 10 minutes to increase blood flow, circulation, pain management and for tissue elasticity prior to therex.  Coban wrap for LLPS into full fist (TE x 10)      Johana received the following manual therapy techniques for 20 minutes:   - joint mobilizations to PIP/MP's all digits and   - PROM isolated PIP and DIP flexion of each digit, hook fist   - intrinsic stretch   - IASTM about dorsal hand/digits  - STM to dorsal forearm to work on extensor extrinsics (NT)      Johana performed therapeutic exercises for 15 minutes including:  - LLPS into full fist with paraffin (10 min)  - re-assess per above  -- - blocking FDP, FDS, hook, wave, flat/full fist, ab/ad    - claudette tape MF-RF and RF-SF with tendon glides   - patient to bring in orthosis for assessment          Patient Education and Home Exercises      Education provided:   - Orthosis wear and care   - Progress towards goals    Written Home Exercises Provided: Patient instructed to cont prior HEP.  Exercises were reviewed and Johana was able to demonstrate them prior to the end of the session.  Johana demonstrated good  understanding of the HEP provided. See EMR under Patient Instructions for exercises provided during therapy sessions.       Assessment     Johana demonstrates significantly limited digit ROM and  strength of Left compared to right hand. She is being transferred again this date back to  [facility] for aggressive ROM.  PIP capsular tightness with hard end feel noted in extension.  PROM painful at end range.  Patient had warm to touch.  Intrinsic tightening noted.  She will greatly benefit from continued skilled OT services to progress with scar management, ROM, strength/coordination, orthosis fabrication, and functional use of left hand.     Johana is making slow but steady progress towards her goals and there are no updates to goals at this time. Pt prognosis is Fair.     Pt will continue to benefit from skilled outpatient occupational therapy to address the deficits listed in the problem list on initial evaluation provide pt/family education and to maximize pt's level of independence in the home and community environment.     Pt's spiritual, cultural and educational needs considered and pt agreeable to plan of care and goals.    Anticipated barriers to occupational therapy: PMH/mechanism of injury/condition    The following goals were discussed with the patient and patient is in agreement with them as to be addressed in the treatment plan.      Short Term Goals: (6 weeks):  1)  Patient to be IND with HEP and modalities for pain management - Met, 8/19/24  2)  Increase ROM 3-10 degrees to increase functional hand use for ADLs/work/leisure activities  3)   Decrease edema .1-.5mm to increase joint mobility /flexibility for functional hand use.   4)  Assess   and pinch and set goals accordingly  - Met, 8/19/24     Long Term Goals :To be met by discharge (12 weeks)   1)  Increase  strength 2-8 lbs. For driving, lifting, carrying   2) Increase pinch strength 1-4 psi's for typing   3)   Increase ROM 11-20 degrees to increase functional hand use for ADLs/work/leisure activities  4) Patient to score at __50___% OR MORE (FSM)  on FOTO to demonstrate improved perception of functional left  hand  Use.     Plan     Pt to be treated by Occupational Therapy 2 times per week for 12 weeks during the  certification period from 9/30/24 to 12/30/24 to achieve the established goals.     Treatment to include: Paraffin, Fluidotherapy, Manual therapy/joint mobilizations, Modalities for pain management, US 3 mhz, Therapeutic exercises/activities., Iontophoresis with 2.0 cc Dexamethasone, Strengthening, Orthotic Fabrication/Fit/Training, Edema Control, Scar Management, Electrical Modalities, Joint Protection, and Energy Conservation, as well as any other treatments deemed necessary based on the patient's needs or progress.     I certify the need for these services furnished under the plan of treatment and while under my care.     ____________________________________________________________________  Physician/Referring Practitioner   Date of Signature          RAHUL Marques/MARY, CHT

## 2024-10-07 ENCOUNTER — CLINICAL SUPPORT (OUTPATIENT)
Dept: REHABILITATION | Facility: HOSPITAL | Age: 59
End: 2024-10-07
Payer: COMMERCIAL

## 2024-10-07 DIAGNOSIS — R29.898 DECREASED GRIP STRENGTH OF LEFT HAND: ICD-10-CM

## 2024-10-07 DIAGNOSIS — M79.642 HAND PAIN, LEFT: Primary | ICD-10-CM

## 2024-10-07 DIAGNOSIS — M25.60 RANGE OF MOTION DEFICIT: ICD-10-CM

## 2024-10-07 DIAGNOSIS — M25.642 STIFFNESS OF LEFT HAND JOINT: ICD-10-CM

## 2024-10-07 PROCEDURE — 97530 THERAPEUTIC ACTIVITIES: CPT

## 2024-10-07 PROCEDURE — 97140 MANUAL THERAPY 1/> REGIONS: CPT

## 2024-10-07 NOTE — PROGRESS NOTES
[facility]  Occupational Therapy Daily Treatment Note    Date: 10/7/2024  Name: Johana Wright  Clinic Number: [id number]    Therapy Diagnosis:   1. Hand pain, left        2. Stiffness of left hand joint        3. Range of motion deficit        4. Decreased  strength of left hand          Medical Diagnosis: L Dupuytren's fasciotomy MF, RF, SM and thumb, 5/16/24    ICD-10:  Dupuytren's contracture of left hand [M72.0]  Dupuytren's disease of palm with contracture [M72.0]     Physician: Aysha Nowak MD     Physician Orders: Specific Treatment Requested  Dupuytren's Fasciectomy  DOS 5.16.24 Pt to be seen 3 - 5 days post-op for the following:  Fabricate a custom splint in the following positions:  A hand-based, full extension pan splint.  ()  If there is a PIP flexion contracture of 30 deg or more, fabricate a second splint to alternate in: An intrinsic plus splint with MPS in flexion to encourage full IP extension. (Fran Rosa Splint) ()  Pt to attend therapy 3 times per week for ROM, wound care, and scar management.  Suture removal by therapy at 7-10 days post op or 10-14 days for DMII. Teach Scar Massage.  Pt is to see physician at 2- 4 weeks post-op. Await further orders to continue therapy     Surgical Procedure and Date: 5/16/24  1.  Left small finger partial Dupuytren's fasciectomy with PIP joint release, cpt 68472  2.  Left ring finger partial Dupuytren's fasciectomy with PIP joint release, cpt 96343  3.  Left long finger partial Dupuytren's fasciectomy with PIP joint release, cpt 90438  4.  Left thumb partial Dupuytren's fasciectomy with PIP joint release, cpt 19004     Evaluation Date: 5/22/2024  Re-evaluation: 8/19/24  Plan of Care Certification Period: 8/22/24  Updated POC: 12/30/24  Insurance Authorization Period Expiration: 12/31/24  Visit # / Visits authorized: 27 / 32    FOTO #1: 33%  FOTO #2:   FOTO #3:      Precautions:  Standard     Time In:  1145  Time Out: 1230 pm  Total Appointment Time (timed & untimed codes): 45 minutes      SUBJECTIVE     Pt reports: Returning to [facility] for aggressive therapy, then MD may consider procedure ; Pain 3/10 on average, 7/10 with PROM ; hand warm to touch.     She was compliant with home exercise program given last session.   Response to previous treatment: tolerated well  Functional change: feels improvement in ROM with static progressive orthosis    Pain: 4/10  Location: left hand       OBJECTIVE     Edema. Measured in centimeters.    5/22/2024 7/9/2024          Proximal Wrist Crease 14.9 14.2   Proximal Palmar Crease  17 16.8   MCPs 17.2 17.4         Hand ROM. Measured in degrees.    5/22/2024 7/9/2024 7/24/2024 8/19/24 9/30/24         Left Left  Transfer to [facility]             Index: MP  0/80 0/65 0/75 -12/75 20/60              PIP     0/46 -20/80 -22/80 -20/80 24/80              DIP 0/40 0/45 0/44 0/55 0/60              MARTIN  166 170 177 178 156             Long:  MP 0/70 0/65 -45/60 -30/75 25/63              PIP 0/70 0/67 0/74 -16/70 20/70              DIP 0/60 0/45 0/54 0/60 0/65              MARTIN 200  177 143 159 153             Ring:   MP 0/68 0/65 -41/65 -35/75 25/60              PIP 0/56 0/40 36/55 0/44 0/52              DIP 0/55 0/17 0/30 0/23 0/40              MARTIN  179 122 105 107 127             Small:  MP 0/36 0/47 0/55 -15/42 6/40               PIP 13/42 -42/50 -48/70 -36/67 40/67               DIP 0/10 0/10 0/15 0/5 0/0              MARTIN  75 65 95 (+30) 63 61             Thumb: MP 0/56 66 65                  IP 0/53 50 46            Opposition To RF P1 of SF P1 of SF       8/7/24  Middle finger PIP joint:  75 isolated  70 composite    Ring finger PIP joint:  58 isolated (63* passive by end of session)  48 composite    Small finger PIP joint:  60 isolated  60 composite     STRENGTH: (Measured in pounds using a Dynamometer and pinch meter)   Right  8/19/24 Left  8/19/24    Setting 2 20, 30, 25 5, 5, 5     Average 25 lb 5 lb          Manual Muscle Testing:  SF - FDP, FDS EDM, ADM, ODM 3-/5        CMS Impairment/Limitation/Restriction for FOTO Survey     Therapist reviewed FOTO scores for Johana Wright on 5/22/2024.   FOTO documents entered into [employer] - see Media section.     Limitation Score: ___33___%      Goal: ___56___%  Category: Self Care           Treatment     Johana received the treatments listed below:       Supervised modalities: Patient received paraffin bath to left hand(s) for 10 minutes to increase blood flow, circulation, pain management and for tissue elasticity prior to therex.  Coban wrap for LLPS into full fist (TE x 10)      Johana received the following manual therapy techniques for 20 minutes:   - joint mobilizations to PIP/MP's all digits and   - PROM isolated PIP and DIP flexion of each digit, hook fist   - intrinsic stretch   -    Johana performed therapeutic exercises for 15 minutes including:  - LLPS into full fist with paraffin (10 min)  -- - blocking FDP, FDS, hook, wave, flat/full fist, ab/ad    - claudette tape IF - MF , MF-RF and RF-SF with tendon glides   - Assessed dynasplint for finger and static progressive orthosis for fit. Recommend continuing with static progressive with light tension and increase time 15-30 min if able.           Patient Education and Home Exercises      Education provided:   - Orthosis wear and care   - Progress towards goals    Written Home Exercises Provided: Patient instructed to cont prior HEP.  Exercises were reviewed and Johana was able to demonstrate them prior to the end of the session.  Johana demonstrated good  understanding of the HEP provided. See EMR under Patient Instructions for exercises provided during therapy sessions.       Assessment     Johana demonstrates significantly limited digit ROM and  strength of Left compared to right hand. She is being transferred again this date back to [facility] for aggressive ROM.  PIP capsular tightness  with hard end feel noted in extension.  PROM painful at end range.  Patient had warm to touch.  Intrinsic tightening noted.  She will greatly benefit from continued skilled OT services to progress with scar management, ROM, strength/coordination, orthosis fabrication, and functional use of left hand.     Johana is making slow but steady progress towards her goals and there are no updates to goals at this time. Pt prognosis is Fair.     Pt will continue to benefit from skilled outpatient occupational therapy to address the deficits listed in the problem list on initial evaluation provide pt/family education and to maximize pt's level of independence in the home and community environment.     Pt's spiritual, cultural and educational needs considered and pt agreeable to plan of care and goals.    Anticipated barriers to occupational therapy: PMH/mechanism of injury/condition    The following goals were discussed with the patient and patient is in agreement with them as to be addressed in the treatment plan.      Short Term Goals: (6 weeks):  1)  Patient to be IND with HEP and modalities for pain management - Met, 8/19/24  2)  Increase ROM 3-10 degrees to increase functional hand use for ADLs/work/leisure activities  3)   Decrease edema .1-.5mm to increase joint mobility /flexibility for functional hand use.   4)  Assess   and pinch and set goals accordingly  - Met, 8/19/24     Long Term Goals :To be met by discharge (12 weeks)   1)  Increase  strength 2-8 lbs. For driving, lifting, carrying   2) Increase pinch strength 1-4 psi's for typing   3)   Increase ROM 11-20 degrees to increase functional hand use for ADLs/work/leisure activities  4) Patient to score at __50___% OR MORE (FSM)  on FOTO to demonstrate improved perception of functional left  hand  Use.     Plan     Pt to be treated by Occupational Therapy 2 times per week for 12 weeks during the certification period from 9/30/24 to 12/30/24 to achieve the  established goals.     Treatment to include: Paraffin, Fluidotherapy, Manual therapy/joint mobilizations, Modalities for pain management, US 3 mhz, Therapeutic exercises/activities., Iontophoresis with 2.0 cc Dexamethasone, Strengthening, Orthotic Fabrication/Fit/Training, Edema Control, Scar Management, Electrical Modalities, Joint Protection, and Energy Conservation, as well as any other treatments deemed necessary based on the patient's needs or progress.     I certify the need for these services furnished under the plan of treatment and while under my care.     ____________________________________________________________________  Physician/Referring Practitioner   Date of Signature          RAHUL Marques/MARY, CHT

## 2024-10-16 ENCOUNTER — CLINICAL SUPPORT (OUTPATIENT)
Dept: REHABILITATION | Facility: HOSPITAL | Age: 59
End: 2024-10-16
Payer: COMMERCIAL

## 2024-10-16 DIAGNOSIS — M25.642 STIFFNESS OF LEFT HAND JOINT: ICD-10-CM

## 2024-10-16 DIAGNOSIS — M79.642 HAND PAIN, LEFT: Primary | ICD-10-CM

## 2024-10-16 DIAGNOSIS — R29.898 DECREASED GRIP STRENGTH OF LEFT HAND: ICD-10-CM

## 2024-10-16 DIAGNOSIS — M25.60 RANGE OF MOTION DEFICIT: ICD-10-CM

## 2024-10-16 PROCEDURE — 97140 MANUAL THERAPY 1/> REGIONS: CPT

## 2024-10-16 PROCEDURE — 97018 PARAFFIN BATH THERAPY: CPT

## 2024-10-16 PROCEDURE — 97530 THERAPEUTIC ACTIVITIES: CPT

## 2024-10-16 NOTE — PROGRESS NOTES
[facility]  Occupational Therapy Daily Treatment Note    Date: 10/16/2024  Name: Johana Wright  Clinic Number: [id number]    Therapy Diagnosis:   1. Hand pain, left        2. Stiffness of left hand joint        3. Range of motion deficit        4. Decreased  strength of left hand            Medical Diagnosis: L Dupuytren's fasciotomy MF, RF, SM and thumb, 5/16/24    ICD-10:  Dupuytren's contracture of left hand [M72.0]  Dupuytren's disease of palm with contracture [M72.0]     Physician: Aysha Nowak MD     Physician Orders: Specific Treatment Requested  Dupuytren's Fasciectomy  DOS 5.16.24 Pt to be seen 3 - 5 days post-op for the following:  Fabricate a custom splint in the following positions:  A hand-based, full extension pan splint.  ()  If there is a PIP flexion contracture of 30 deg or more, fabricate a second splint to alternate in: An intrinsic plus splint with MPS in flexion to encourage full IP extension. (Fran Rosa Splint) ()  Pt to attend therapy 3 times per week for ROM, wound care, and scar management.  Suture removal by therapy at 7-10 days post op or 10-14 days for DMII. Teach Scar Massage.  Pt is to see physician at 2- 4 weeks post-op. Await further orders to continue therapy     Surgical Procedure and Date: 5/16/24  1.  Left small finger partial Dupuytren's fasciectomy with PIP joint release, cpt 06000  2.  Left ring finger partial Dupuytren's fasciectomy with PIP joint release, cpt 59446  3.  Left long finger partial Dupuytren's fasciectomy with PIP joint release, cpt 06211  4.  Left thumb partial Dupuytren's fasciectomy with PIP joint release, cpt 96698     Evaluation Date: 5/22/2024  Re-evaluation: 8/19/24  Plan of Care Certification Period: 8/22/24  Updated POC: 12/30/24  Insurance Authorization Period Expiration: 12/31/24  Visit # / Visits authorized: 27 / 32    FOTO #1: 33%  FOTO #2:   FOTO #3:      Precautions:  Standard     Time In:  1130  Time Out: 1215 pm  Total Appointment Time (timed & untimed codes): 45 minutes      SUBJECTIVE     Pt reports: Patient reports no significant change.  She is able to wear static progressive orthosis longer and slight improvement in Middle finger noted, otherwise unchanged.  Will await MD followup in November and probable capsulotomy in January; Will re-assess and hold next session; Pain 3/10 on average, 7/10 with PROM ; hand warm to touch.     She was compliant with home exercise program given last session.   Response to previous treatment: tolerated well  Functional change: feels improvement in ROM with static progressive orthosis    Pain: 4/10  Location: left hand       OBJECTIVE   RE-assess next visit prior to Modality use and place on hold following MD followup for surgical consult.     Edema. Measured in centimeters.    5/22/2024 7/9/2024          Proximal Wrist Crease 14.9 14.2   Proximal Palmar Crease  17 16.8   MCPs 17.2 17.4         Hand ROM. Measured in degrees.    5/22/2024 7/9/2024 7/24/2024 8/19/24 9/30/24 10/16/24         Left Left  Transfer to [facility] Left               Index: MP  0/80 0/65 0/75 -12/75 20/60 18/75              PIP     0/46 -20/80 -22/80 -20/80 24/80 35/85              DIP 0/40 0/45 0/44 0/55 0/60 0/65              MARTIN  166 170 177 178 156 172              Long:  MP 0/70 0/65 -45/60 -30/75 25/63 25/70              PIP 0/70 0/67 0/74 -16/70 20/70 20/73              DIP 0/60 0/45 0/54 0/60 0/65 0/64              MARTIN 200  177 143 159 153 162              Ring:   MP 0/68 0/65 -41/65 -35/75 25/60 35/70              PIP 0/56 0/40 36/55 0/44 0/52 0/38              DIP 0/55 0/17 0/30 0/23 0/40 0/35              MARTIN  179 122 105 107 127 108              Small:  MP 0/36 0/47 0/55 -15/42 6/40 0/35               PIP 13/42 -42/50 -48/70 -36/67 40/67 45/68               DIP 0/10 0/10 0/15 0/5 0/0 0/0              MARTIN  75 65 95 (+30) 63 61 58              Thumb: MP 0/56 66 65                    IP 0/53 50 46             Opposition To RF P1 of SF P1 of SF        8/7/24  Middle finger PIP joint:  75 isolated  70 composite    Ring finger PIP joint:  58 isolated (63* passive by end of session)  48 composite    Small finger PIP joint:  60 isolated  60 composite     STRENGTH: (Measured in pounds using a Dynamometer and pinch meter)   Right  8/19/24 Left  8/19/24    Setting 2 20, 30, 25 5, 5, 5    Average 25 lb 5 lb          Manual Muscle Testing:  SF - FDP, FDS EDM, ADM, ODM 3-/5        CMS Impairment/Limitation/Restriction for FOTO Survey     Therapist reviewed FOTO scores for Johana Wright on 5/22/2024.   FOTO documents entered into [employer] - see Media section.     Limitation Score: ___33___%      Goal: ___56___%  Category: Self Care           Treatment     Johana received the treatments listed below:       Supervised modalities: Patient received paraffin bath to left hand(s) for 10 minutes to increase blood flow, circulation, pain management and for tissue elasticity prior to therex.  Coban wrap for LLPS into full fist (TE x 10)      Johana received the following manual therapy techniques for 20 minutes:   - joint mobilizations to PIP/MP's all digits and   - PROM isolated PIP and DIP flexion of each digit, hook fist   - intrinsic stretch   -    Johana performed therapeutic exercises for 15 minutes including:  - ROM per above   - LLPS into full fist with paraffin (10 min)  -- - blocking FDP, FDS, hook, wave, flat/full fist, ab/ad    -        Patient Education and Home Exercises      Education provided:   - Orthosis wear and care   - Progress towards goals    Written Home Exercises Provided: Patient instructed to cont prior HEP.  Exercises were reviewed and Johana was able to demonstrate them prior to the end of the session.  Johana demonstrated good  understanding of the HEP provided. See EMR under Patient Instructions for exercises provided during therapy sessions.       Assessment     Johana  demonstrates significantly limited digit ROM and  strength of Left compared to right hand. She is being transferred again this date back to [facility] for aggressive ROM.  PIP capsular tightness with hard end feel noted in extension.  PROM painful at end range.  Patient hand warm to touch.  Intrinsic tightening noted.  ROM slightly improved in IF, MF, but remains significantly limited.  Will await next session and re-assess and place on hold for probable surgical procedure in January due to new insurance.       She will greatly benefit from continued skilled OT services to progress with scar management, ROM, strength/coordination, orthosis fabrication, and functional use of left hand.     Johana is making slow but steady progress towards her goals and there are no updates to goals at this time. Pt prognosis is Fair.     Pt will continue to benefit from skilled outpatient occupational therapy to address the deficits listed in the problem list on initial evaluation provide pt/family education and to maximize pt's level of independence in the home and community environment.     Pt's spiritual, cultural and educational needs considered and pt agreeable to plan of care and goals.    Anticipated barriers to occupational therapy: PMH/mechanism of injury/condition    The following goals were discussed with the patient and patient is in agreement with them as to be addressed in the treatment plan.      Short Term Goals: (6 weeks):  1)  Patient to be IND with HEP and modalities for pain management - Met, 8/19/24  2)  Increase ROM 3-10 degrees to increase functional hand use for ADLs/work/leisure activities  3)   Decrease edema .1-.5mm to increase joint mobility /flexibility for functional hand use.   4)  Assess   and pinch and set goals accordingly  - Met, 8/19/24     Long Term Goals :To be met by discharge (12 weeks)   1)  Increase  strength 2-8 lbs. For driving, lifting, carrying   2) Increase pinch strength 1-4  psi's for typing   3)   Increase ROM 11-20 degrees to increase functional hand use for ADLs/work/leisure activities  4) Patient to score at __50___% OR MORE (FSM)  on FOTO to demonstrate improved perception of functional left  hand  Use.     Plan     Pt to be treated by Occupational Therapy 2 times per week for 12 weeks during the certification period from 9/30/24 to 12/30/24 to achieve the established goals.     Treatment to include: Paraffin, Fluidotherapy, Manual therapy/joint mobilizations, Modalities for pain management, US 3 mhz, Therapeutic exercises/activities., Iontophoresis with 2.0 cc Dexamethasone, Strengthening, Orthotic Fabrication/Fit/Training, Edema Control, Scar Management, Electrical Modalities, Joint Protection, and Energy Conservation, as well as any other treatments deemed necessary based on the patient's needs or progress.     I certify the need for these services furnished under the plan of treatment and while under my care.     ____________________________________________________________________  Physician/Referring Practitioner   Date of Signature          RAHUL Marques/MARY, JUAN CARLOST

## 2024-11-05 DIAGNOSIS — M79.642 LEFT HAND PAIN: Primary | ICD-10-CM

## 2024-11-15 ENCOUNTER — PATIENT MESSAGE (OUTPATIENT)
Dept: ORTHOPEDICS | Facility: CLINIC | Age: 59
End: 2024-11-15
Payer: COMMERCIAL

## 2024-12-06 NOTE — H&P
[facility] ([place])  History & Physical    Subjective:      Worsening pain significant stiffness left shoulder no improvement with therapy.  Exam MRI consistent with the adhesive capsulitis partial rotator cuff tear plan is lysis of adhesions decompression significant risk discussed repair as needed    Patient Active Problem List    Diagnosis Date Noted    Range of motion deficit 05/22/2024    Decreased  strength of left hand 05/22/2024    Hand pain, left 05/21/2024    Stiffness of left hand joint 05/21/2024    Dupuytren's contracture of left hand 05/16/2024    Anxiety 04/24/2024    Mixed hyperlipidemia 04/24/2024    Intractable migraine without status migrainosus 03/05/2024    Immune deficiency disorder 03/05/2024    History of abnormal cervical Pap smear 01/30/2020     Past Medical History:   Diagnosis Date    Abnormal Pap smear of cervix age 29    cryo    Breast disorder      Past Surgical History:   Procedure Laterality Date    BREAST SURGERY  07/2023    implant removal and replacment    BREAST SURGERY Bilateral 01/2021    implant removal and replacement    CERVICAL BIOPSY  W/ LOOP ELECTRODE EXCISION      07/09/2020    COLD KNIFE CONIZATION OF CERVIX N/A 07/09/2020    Procedure: CONE BIOPSY, CERVIX, USING COLD KNIFE;  Surgeon: Talisha Joshua MD;  Location: [facility] OR;  Service: OB/GYN;  Laterality: N/A;    DUPUYTREN CONTRACTURE RELEASE Left 5/16/2024    Procedure: RELEASE, DUPUYTREN THUMB LONG RING & SMALL FINGER;  Surgeon: Aysha Nowak MD;  Location: [facility] OR;  Service: Orthopedics;  Laterality: Left;    HAND SURGERY      LASER ABLATION OF THE CERVIX  age 29    cryo ?    RECTAL PROLAPSE REPAIR  04/2019    RELEASE OF CONTRACTURE OF JOINT Left 5/16/2024    Procedure: RELEASE, CONTRACTURE, JOINT THUMB LONG, RING AND SMALL FINGER;  Surgeon: Aysha Nowak MD;  Location: [facility] OR;  Service: Orthopedics;  Laterality: Left;    SINUS SURGERY      TONSILLECTOMY, ADENOIDECTOMY      TUBAL LIGATION        No medications prior to admission.     Review of patient's allergies indicates:   Allergen Reactions    Adhesive      Other reaction(s): rash, skin peels / can use paper tape     Social History     Tobacco Use    Smoking status: Never    Smokeless tobacco: Never   Substance Use Topics    Alcohol use: Yes     Alcohol/week: 14.0 standard drinks of alcohol     Types: 14 Glasses of wine per week     Comment: about 2 glasses of wine per day.  Appx 14 in week.     Family History   Problem Relation Name Age of Onset    Diabetes Mellitus Mother      Stroke Mother          6 months after  passed.    Colon cancer Father  89    Heart attack Father  62        then subsequent    Coronary artery disease Father      Breast cancer Neg Hx      Ovarian cancer Neg Hx      Lung cancer Neg Hx       Social History     Tobacco Use    Smoking status: Never    Smokeless tobacco: Never   Substance Use Topics    Alcohol use: Yes     Alcohol/week: 14.0 standard drinks of alcohol     Types: 14 Glasses of wine per week     Comment: about 2 glasses of wine per day.  Appx 14 in week.    Drug use: No       No medications prior to admission.     Review of patient's allergies indicates:   Allergen Reactions    Adhesive      Other reaction(s): rash, skin peels / can use paper tape        Review of Systems:  All other systems reviewed and are negative.  .    No current facility-administered medications for this encounter.     Current Outpatient Medications   Medication Sig    butalbital-acetaminophen-caffeine -40 mg (FIORICET, ESGIC) -40 mg per tablet Take 1 tablet by mouth every 48 hours as needed for Headaches.    ibuprofen (ADVIL,MOTRIN) 800 MG tablet Take 1 tablet (800 mg total) by mouth every 8 (eight) hours as needed for Pain.    immun glob G,IgG,/pro/IgA 0-50 (PRIVIGEN IV) Inject into the vein every 28 days. Per Ab deficiency    predniSONE (DELTASONE) 20 MG tablet Take 40 mg by mouth once daily.    spironolactone  (ALDACTONE) 50 MG tablet Take 50 mg by mouth once daily.    tretinoin (RETIN-A) 0.05 % cream     venlafaxine (EFFEXOR) 25 MG Tab Take by mouth.       Objective:      Vital Signs (Most Recent)       Vital Signs Range (Last 24H):       Physical Exam heart regular lungs clear minimal rotation elevation 100° significant pain    Imaging Review:   Partial rotator cuff tear with adhesive capsulitis MRI      Assessment:      There are no hospital problems to display for this patient.      Plan:    The various methods of treatment have been discussed with the patient and family.   After consideration of risks, benefits and other options for treatment, the patient has consented to surgical interventions (significant risk discussed).  Questions were answered and Pre-op teaching was done by me.

## 2024-12-16 ENCOUNTER — ANESTHESIA EVENT (OUTPATIENT)
Dept: SURGERY | Facility: OTHER | Age: 59
End: 2024-12-16
Payer: COMMERCIAL

## 2024-12-16 ENCOUNTER — HOSPITAL ENCOUNTER (OUTPATIENT)
Dept: PREADMISSION TESTING | Facility: OTHER | Age: 59
Discharge: HOME | End: 2024-12-16
Attending: ORTHOPAEDIC SURGERY
Payer: COMMERCIAL

## 2024-12-16 VITALS
HEIGHT: 62 IN | BODY MASS INDEX: 19.32 KG/M2 | TEMPERATURE: 97 F | DIASTOLIC BLOOD PRESSURE: 59 MMHG | RESPIRATION RATE: 16 BRPM | OXYGEN SATURATION: 98 % | SYSTOLIC BLOOD PRESSURE: 126 MMHG | WEIGHT: 105 LBS | HEART RATE: 84 BPM

## 2024-12-16 DIAGNOSIS — Z01.818 PREOP TESTING: Primary | ICD-10-CM

## 2024-12-16 LAB
ANION GAP SERPL CALC-SCNC: 11 MMOL/L (ref 8–16)
BASOPHILS # BLD AUTO: 0.04 K/UL (ref 0–0.2)
BASOPHILS NFR BLD: 0.7 % (ref 0–1.9)
BUN SERPL-MCNC: 12 MG/DL (ref 6–20)
CALCIUM SERPL-MCNC: 10 MG/DL (ref 8.7–10.5)
CHLORIDE SERPL-SCNC: 102 MMOL/L (ref 95–110)
CO2 SERPL-SCNC: 28 MMOL/L (ref 23–29)
CREAT SERPL-MCNC: 0.7 MG/DL (ref 0.5–1.4)
DIFFERENTIAL METHOD BLD: ABNORMAL
EOSINOPHIL # BLD AUTO: 0.1 K/UL (ref 0–0.5)
EOSINOPHIL NFR BLD: 2.3 % (ref 0–8)
ERYTHROCYTE [DISTWIDTH] IN BLOOD BY AUTOMATED COUNT: 12.3 % (ref 11.5–14.5)
EST. GFR  (NO RACE VARIABLE): >60 ML/MIN/1.73 M^2
GLUCOSE SERPL-MCNC: 95 MG/DL (ref 70–110)
HCT VFR BLD AUTO: 42.1 % (ref 37–48.5)
HGB BLD-MCNC: 14 G/DL (ref 12–16)
IMM GRANULOCYTES # BLD AUTO: 0.01 K/UL (ref 0–0.04)
IMM GRANULOCYTES NFR BLD AUTO: 0.2 % (ref 0–0.5)
LYMPHOCYTES # BLD AUTO: 1.4 K/UL (ref 1–4.8)
LYMPHOCYTES NFR BLD: 24.1 % (ref 18–48)
MCH RBC QN AUTO: 31.2 PG (ref 27–31)
MCHC RBC AUTO-ENTMCNC: 33.3 G/DL (ref 32–36)
MCV RBC AUTO: 94 FL (ref 82–98)
MONOCYTES # BLD AUTO: 0.4 K/UL (ref 0.3–1)
MONOCYTES NFR BLD: 7.6 % (ref 4–15)
NEUTROPHILS # BLD AUTO: 3.7 K/UL (ref 1.8–7.7)
NEUTROPHILS NFR BLD: 65.1 % (ref 38–73)
NRBC BLD-RTO: 0 /100 WBC
PLATELET # BLD AUTO: 294 K/UL (ref 150–450)
PMV BLD AUTO: 10.7 FL (ref 9.2–12.9)
POTASSIUM SERPL-SCNC: 4.2 MMOL/L (ref 3.5–5.1)
RBC # BLD AUTO: 4.49 M/UL (ref 4–5.4)
SODIUM SERPL-SCNC: 141 MMOL/L (ref 136–145)
WBC # BLD AUTO: 5.65 K/UL (ref 3.9–12.7)

## 2024-12-16 PROCEDURE — 85025 COMPLETE CBC W/AUTO DIFF WBC: CPT | Performed by: ANESTHESIOLOGY

## 2024-12-16 PROCEDURE — 36415 COLL VENOUS BLD VENIPUNCTURE: CPT | Performed by: ANESTHESIOLOGY

## 2024-12-16 PROCEDURE — 80048 BASIC METABOLIC PNL TOTAL CA: CPT | Performed by: ANESTHESIOLOGY

## 2024-12-16 RX ORDER — LIDOCAINE HYDROCHLORIDE 10 MG/ML
0.5 INJECTION, SOLUTION EPIDURAL; INFILTRATION; INTRACAUDAL; PERINEURAL ONCE
Status: CANCELLED | OUTPATIENT
Start: 2024-12-16 | End: 2024-12-16

## 2024-12-16 RX ORDER — SODIUM CHLORIDE, SODIUM LACTATE, POTASSIUM CHLORIDE, CALCIUM CHLORIDE 600; 310; 30; 20 MG/100ML; MG/100ML; MG/100ML; MG/100ML
INJECTION, SOLUTION INTRAVENOUS CONTINUOUS
Status: CANCELLED | OUTPATIENT
Start: 2024-12-16

## 2024-12-16 NOTE — DISCHARGE INSTRUCTIONS
Information to Prepare you for your Surgery    PRE-ADMIT TESTING   [address]  [place] [facility]  [address]   [phone]  - CESAR Mccrary    Your surgery has been scheduled at [facility]. We are pleased to have the opportunity to serve you. For Further Information please call [phone].    On the day of surgery please report to Registration on the 1st floor of the [facility].    CONTACT YOUR PHYSICIAN'S OFFICE THE DAY PRIOR TO YOUR SURGERY TO OBTAIN YOUR ARRIVAL TIME.     The evening before surgery do not eat anything after 9 p.m. ( this includes hard candy, chewing gum and mints).  You may only have GATORADE, POWERADE AND WATER  from 9 p.m. until you leave your home.     DRINK AT LEAST 12 OUNCES THE MORNING OF SURGERY    DO NOT DRINK ANY LIQUIDS ON THE WAY TO THE HOSPITAL.      Why does your anesthesiologist allow you to drink Gatorade/Powerade before surgery?    Gatorade/Powerade helps to increase your comfort before surgery and to decrease your nausea after surgery.  The carbohydrates in the Gatorade/Powerade help reduce your body's stress response to surgery.  If you are diabetic, drink only water prior to surgery.       Outpatient Surgery- May allow 2 adults (18 and older)/ Support Persons (1 being the designated ) for all surgical/procedural patients. A breastfeeding mother will be allowed her infant and 2 adult Support Persons. No one under the age of 18 will be allowed in the building.    MEDICATION INSTRUCTIONS: TAKE medications checked off by the Anesthesiologist on your Medication List.      Angiogram Patients: Take medications as instructed by your physician, including aspirin.     Surgery Patients:  If you take ASPIRIN - Your PHYSICIAN/SURGEON will need to inform you IF/OR when you need to stop taking aspirin prior to your surgery.     Starting the week prior to surgery, do not take any medications containing IBUPROFEN or NSAIDS (Advil,  Aleve, BC, Celebrex, Goody's, Ketorolac, Meloxicam, Mobic, Motrin, Naproxen, Toradol, etc).  If you are not sure if you should take a medicine please call your surgeon's office.  You may take Tylenol.    Do Not Wear any make-up (especially eye make-up) to surgery. Please remove any false eyelashes or eyelash extensions. If you arrive the day of surgery with makeup/eyelashes on you will be required to remove prior to surgery. (There is a risk of corneal abrasions if eye makeup/eyelash extensions are not removed)    Leave all valuables at home.   Do Not wear any jewelry or watches, including any metal in body piercings. Jewelry must be removed prior to coming to the hospital.  There is a possibility that rings that are unable to be removed may be cut off if they are on the surgical extremity.    Please remove all hair extensions, wigs, clips and any other metal accessories/ ornaments from your hair.  These items may pose a flammable/fire risk in Surgery and must be removed.    Do not shave your surgical area at least 5 days prior to your surgery. The surgical prep will be performed at the hospital according to Infection Control regulations.    Contact Lens must be removed before surgery. Either do not wear the contact lens or bring a case and solution for storage.  Please bring a container for eyeglasses or dentures as required.  Bring any paperwork your physician has provided, such as consent forms,  history and physicals, doctor's orders, etc.   Bring comfortable clothes that are loose fitting to wear upon discharge. Take into consideration the type of surgery being performed.  Maintain your diet as advised per your physician the day prior to surgery.    Adequate rest the night before surgery is advised.   Park in the Parking lot behind the hospital or in the [employer] [facility] across the street from the parking lot. Parking is complimentary.  If you will be discharged the same day as your procedure, please  arrange for a responsible adult to drive you home or to accompany you if traveling by taxi.   YOU WILL NOT BE PERMITTED TO DRIVE OR TO LEAVE THE HOSPITAL ALONE AFTER SURGERY.   If you are being discharged the same day, it is strongly recommended that you arrange for someone to remain with you for the first 24 hrs following your surgery.    The Surgeon will speak to your family/visitor after your surgery regarding the outcome of your surgery and post op care.  The Surgeon may speak to you after your surgery, but there is a possibility you may not remember the details.  Please check with your family members regarding the conversation with the Surgeon.         Bathing Instructions with Hibiclens:    Shower the evening before and morning of your procedure with Chlorhexidine (Hibiclens)  do not use Chlorhexidine on your face or genitals. Do not get in your eyes.  Wash your face with water and your regular face wash/soap  Use your regular shampoo  Apply Chlorhexidine (Hibiclens) directly on your skin or on a wet washcloth and wash gently. When showering: Move away from the shower stream when applying Chlorhexidine (Hibiclens) to avoid rinsing off too soon.  Rinse thoroughly with warm water  Do not dilute Chlorhexidine (Hibiclens)   Dry off as usual, do not use any deodorant, powder, body lotions, perfume, after shave or cologne.     We strongly recommend whoever is bringing you home be present for discharge instructions.  This will ensure a thorough understanding for your post op home care.    If the patient has fever, cough, or signs/symptoms of Flu or Covid please do not come in for your surgery.  First, contact the pre op department at [phone] (unit opens at 5AM) and then contact your surgeon and your primary care physician for further instructions.      If applicable, please bring any blood pressure and/or diabetes medications with you the day of surgery.  If on home oxygen, even at night, please bring your  portable oxygen tank with you the day of surgery in case it is needed for your discharge.      Thanks,  CESAR Mccrary

## 2024-12-16 NOTE — ANESTHESIA PREPROCEDURE EVALUATION
12/16/2024  Johana Wright is a 59 y.o., female.      Pre-op Assessment    I have reviewed the Patient Summary Reports.     I have reviewed the Nursing Notes. I have reviewed the NPO Status.   I have reviewed the Medications.     Review of Systems  Anesthesia Hx:  No previous Anesthesia   History of prior surgery of interest to airway management or planning:          Denies Family Hx of Anesthesia complications.    Denies Personal Hx of Anesthesia complications.                    Social:  Non-Smoker       Hematology/Oncology:  Hematology Normal   Oncology Normal                                   EENT/Dental:  EENT/Dental Normal           Cardiovascular:                hyperlipidemia                               Pulmonary:  Pulmonary Normal                       Renal/:  Renal/ Normal                 Hepatic/GI:  Hepatic/GI Normal                    Musculoskeletal:  Musculoskeletal Normal                Neurological:      Headaches                                 Endocrine:  Endocrine Normal            Dermatological:  Skin Normal    Psych:  Psychiatric History anxiety                 Physical Exam  General: Cooperative, Well nourished and Oriented    Airway:  Mallampati: II   Mouth Opening: Normal  Neck ROM: Normal ROM    Dental:  Caps / Implants    Anesthesia Plan  Type of Anesthesia, risks & benefits discussed:    Anesthesia Type: Gen ETT  Intra-op Monitoring Plan: Standard ASA Monitors  Post Op Pain Control Plan: multimodal analgesia  Induction:  IV  Airway Plan: Video  Informed Consent: Informed consent signed with the Patient and all parties understand the risks and agree with anesthesia plan.  All questions answered.   ASA Score: 2  Anesthesia Plan Notes: Labs today  Paper consent    Ready For Surgery From Anesthesia Perspective.     .

## 2024-12-20 ENCOUNTER — ANESTHESIA (OUTPATIENT)
Dept: SURGERY | Facility: OTHER | Age: 59
End: 2024-12-20
Payer: COMMERCIAL

## 2024-12-20 ENCOUNTER — HOSPITAL ENCOUNTER (OUTPATIENT)
Facility: OTHER | Age: 59
Discharge: HOME | End: 2024-12-20
Attending: ORTHOPAEDIC SURGERY | Admitting: ORTHOPAEDIC SURGERY
Payer: COMMERCIAL

## 2024-12-20 DIAGNOSIS — M75.02 ADHESIVE CAPSULITIS OF LEFT SHOULDER: ICD-10-CM

## 2024-12-20 PROCEDURE — 63600175 PHARM REV CODE 636 W HCPCS: Performed by: ORTHOPAEDIC SURGERY

## 2024-12-20 PROCEDURE — 25000003 PHARM REV CODE 250: Performed by: ORTHOPAEDIC SURGERY

## 2024-12-20 PROCEDURE — 36000711: Performed by: ORTHOPAEDIC SURGERY

## 2024-12-20 PROCEDURE — 37000008 HC ANESTHESIA 1ST 15 MINUTES: Performed by: ORTHOPAEDIC SURGERY

## 2024-12-20 PROCEDURE — 71000015 HC POSTOP RECOV 1ST HR: Performed by: ORTHOPAEDIC SURGERY

## 2024-12-20 PROCEDURE — 36000710: Performed by: ORTHOPAEDIC SURGERY

## 2024-12-20 PROCEDURE — 71000016 HC POSTOP RECOV ADDL HR: Performed by: ORTHOPAEDIC SURGERY

## 2024-12-20 PROCEDURE — 25000003 PHARM REV CODE 250: Performed by: ANESTHESIOLOGY

## 2024-12-20 PROCEDURE — 63600175 PHARM REV CODE 636 W HCPCS: Performed by: ANESTHESIOLOGY

## 2024-12-20 PROCEDURE — 71000039 HC RECOVERY, EACH ADD'L HOUR: Performed by: ORTHOPAEDIC SURGERY

## 2024-12-20 PROCEDURE — 27201423 OPTIME MED/SURG SUP & DEVICES STERILE SUPPLY: Performed by: ORTHOPAEDIC SURGERY

## 2024-12-20 PROCEDURE — 37000009 HC ANESTHESIA EA ADD 15 MINS: Performed by: ORTHOPAEDIC SURGERY

## 2024-12-20 PROCEDURE — 63600175 PHARM REV CODE 636 W HCPCS: Performed by: NURSE ANESTHETIST, CERTIFIED REGISTERED

## 2024-12-20 PROCEDURE — 25000003 PHARM REV CODE 250: Performed by: NURSE ANESTHETIST, CERTIFIED REGISTERED

## 2024-12-20 PROCEDURE — 71000033 HC RECOVERY, INTIAL HOUR: Performed by: ORTHOPAEDIC SURGERY

## 2024-12-20 RX ORDER — DIPHENHYDRAMINE HYDROCHLORIDE 50 MG/ML
25 INJECTION INTRAMUSCULAR; INTRAVENOUS EVERY 6 HOURS PRN
Status: DISCONTINUED | OUTPATIENT
Start: 2024-12-20 | End: 2024-12-20 | Stop reason: HOSPADM

## 2024-12-20 RX ORDER — SODIUM CHLORIDE, SODIUM LACTATE, POTASSIUM CHLORIDE, CALCIUM CHLORIDE 600; 310; 30; 20 MG/100ML; MG/100ML; MG/100ML; MG/100ML
INJECTION, SOLUTION INTRAVENOUS CONTINUOUS
Status: DISCONTINUED | OUTPATIENT
Start: 2024-12-20 | End: 2024-12-20 | Stop reason: HOSPADM

## 2024-12-20 RX ORDER — ROCURONIUM BROMIDE 10 MG/ML
INJECTION, SOLUTION INTRAVENOUS
Status: DISCONTINUED | OUTPATIENT
Start: 2024-12-20 | End: 2024-12-20

## 2024-12-20 RX ORDER — CEFAZOLIN 2 G/1
2 INJECTION, POWDER, FOR SOLUTION INTRAMUSCULAR; INTRAVENOUS
Status: COMPLETED | OUTPATIENT
Start: 2024-12-20 | End: 2024-12-20

## 2024-12-20 RX ORDER — SODIUM CHLORIDE 0.9 % (FLUSH) 0.9 %
5 SYRINGE (ML) INJECTION
Status: DISCONTINUED | OUTPATIENT
Start: 2024-12-20 | End: 2024-12-20 | Stop reason: HOSPADM

## 2024-12-20 RX ORDER — KETAMINE HCL IN 0.9 % NACL 50 MG/5 ML
SYRINGE (ML) INTRAVENOUS
Status: DISCONTINUED | OUTPATIENT
Start: 2024-12-20 | End: 2024-12-20

## 2024-12-20 RX ORDER — HYDROCODONE BITARTRATE AND ACETAMINOPHEN 10; 325 MG/1; MG/1
1 TABLET ORAL EVERY 6 HOURS PRN
Qty: 30 TABLET | Refills: 0 | Status: SHIPPED | OUTPATIENT
Start: 2024-12-20

## 2024-12-20 RX ORDER — KETOROLAC TROMETHAMINE 30 MG/ML
INJECTION, SOLUTION INTRAMUSCULAR; INTRAVENOUS
Status: DISCONTINUED | OUTPATIENT
Start: 2024-12-20 | End: 2024-12-20

## 2024-12-20 RX ORDER — LIDOCAINE HYDROCHLORIDE 20 MG/ML
INJECTION INTRAVENOUS
Status: DISCONTINUED | OUTPATIENT
Start: 2024-12-20 | End: 2024-12-20

## 2024-12-20 RX ORDER — ONDANSETRON HYDROCHLORIDE 2 MG/ML
INJECTION, SOLUTION INTRAVENOUS
Status: DISCONTINUED | OUTPATIENT
Start: 2024-12-20 | End: 2024-12-20

## 2024-12-20 RX ORDER — ROPIVACAINE HYDROCHLORIDE 5 MG/ML
INJECTION, SOLUTION EPIDURAL; INFILTRATION; PERINEURAL
Status: DISCONTINUED | OUTPATIENT
Start: 2024-12-20 | End: 2024-12-20 | Stop reason: HOSPADM

## 2024-12-20 RX ORDER — HYDROMORPHONE HYDROCHLORIDE 2 MG/ML
0.4 INJECTION, SOLUTION INTRAMUSCULAR; INTRAVENOUS; SUBCUTANEOUS EVERY 5 MIN PRN
Status: DISCONTINUED | OUTPATIENT
Start: 2024-12-20 | End: 2024-12-20 | Stop reason: HOSPADM

## 2024-12-20 RX ORDER — PROPOFOL 10 MG/ML
VIAL (ML) INTRAVENOUS
Status: DISCONTINUED | OUTPATIENT
Start: 2024-12-20 | End: 2024-12-20

## 2024-12-20 RX ORDER — PHENYLEPHRINE HYDROCHLORIDE 10 MG/ML
INJECTION INTRAVENOUS
Status: DISCONTINUED | OUTPATIENT
Start: 2024-12-20 | End: 2024-12-20

## 2024-12-20 RX ORDER — ERGOCALCIFEROL 1.25 MG/1
50000 CAPSULE ORAL
COMMUNITY

## 2024-12-20 RX ORDER — MELOXICAM 15 MG/1
15 TABLET ORAL DAILY PRN
COMMUNITY
Start: 2024-12-16

## 2024-12-20 RX ORDER — MIDAZOLAM HYDROCHLORIDE 1 MG/ML
INJECTION INTRAMUSCULAR; INTRAVENOUS
Status: DISCONTINUED | OUTPATIENT
Start: 2024-12-20 | End: 2024-12-20

## 2024-12-20 RX ORDER — CYCLOBENZAPRINE HCL 5 MG
5 TABLET ORAL ONCE
Status: COMPLETED | OUTPATIENT
Start: 2024-12-20 | End: 2024-12-20

## 2024-12-20 RX ORDER — SODIUM CHLORIDE 0.9 % (FLUSH) 0.9 %
3 SYRINGE (ML) INJECTION
Status: DISCONTINUED | OUTPATIENT
Start: 2024-12-20 | End: 2024-12-20 | Stop reason: HOSPADM

## 2024-12-20 RX ORDER — MUPIROCIN 20 MG/G
OINTMENT TOPICAL 2 TIMES DAILY
Status: DISCONTINUED | OUTPATIENT
Start: 2024-12-20 | End: 2024-12-20 | Stop reason: HOSPADM

## 2024-12-20 RX ORDER — GLUCAGON 1 MG
1 KIT INJECTION
Status: DISCONTINUED | OUTPATIENT
Start: 2024-12-20 | End: 2024-12-20 | Stop reason: HOSPADM

## 2024-12-20 RX ORDER — OXYCODONE HYDROCHLORIDE 5 MG/1
5 TABLET ORAL
Status: DISCONTINUED | OUTPATIENT
Start: 2024-12-20 | End: 2024-12-20 | Stop reason: HOSPADM

## 2024-12-20 RX ORDER — PROCHLORPERAZINE EDISYLATE 5 MG/ML
5 INJECTION INTRAMUSCULAR; INTRAVENOUS EVERY 30 MIN PRN
Status: DISCONTINUED | OUTPATIENT
Start: 2024-12-20 | End: 2024-12-20 | Stop reason: HOSPADM

## 2024-12-20 RX ORDER — FENTANYL CITRATE 50 UG/ML
INJECTION, SOLUTION INTRAMUSCULAR; INTRAVENOUS
Status: DISCONTINUED | OUTPATIENT
Start: 2024-12-20 | End: 2024-12-20

## 2024-12-20 RX ORDER — SODIUM CHLORIDE 9 MG/ML
INJECTION, SOLUTION INTRAVENOUS CONTINUOUS
Status: DISCONTINUED | OUTPATIENT
Start: 2024-12-20 | End: 2024-12-20 | Stop reason: HOSPADM

## 2024-12-20 RX ORDER — DEXAMETHASONE SODIUM PHOSPHATE 4 MG/ML
INJECTION, SOLUTION INTRA-ARTICULAR; INTRALESIONAL; INTRAMUSCULAR; INTRAVENOUS; SOFT TISSUE
Status: DISCONTINUED | OUTPATIENT
Start: 2024-12-20 | End: 2024-12-20

## 2024-12-20 RX ORDER — MEPERIDINE HYDROCHLORIDE 25 MG/ML
12.5 INJECTION INTRAMUSCULAR; INTRAVENOUS; SUBCUTANEOUS ONCE AS NEEDED
Status: DISCONTINUED | OUTPATIENT
Start: 2024-12-20 | End: 2024-12-20 | Stop reason: HOSPADM

## 2024-12-20 RX ORDER — LIDOCAINE HYDROCHLORIDE 10 MG/ML
0.5 INJECTION, SOLUTION EPIDURAL; INFILTRATION; INTRACAUDAL; PERINEURAL ONCE
Status: DISCONTINUED | OUTPATIENT
Start: 2024-12-20 | End: 2024-12-20 | Stop reason: HOSPADM

## 2024-12-20 RX ADMIN — HYDROMORPHONE HYDROCHLORIDE 0.4 MG: 2 INJECTION INTRAMUSCULAR; INTRAVENOUS; SUBCUTANEOUS at 12:12

## 2024-12-20 RX ADMIN — ROCURONIUM BROMIDE 40 MG: 10 SOLUTION INTRAVENOUS at 10:12

## 2024-12-20 RX ADMIN — ONDANSETRON HYDROCHLORIDE 4 MG: 2 INJECTION INTRAMUSCULAR; INTRAVENOUS at 10:12

## 2024-12-20 RX ADMIN — DEXAMETHASONE SODIUM PHOSPHATE 4 MG: 4 INJECTION, SOLUTION INTRAMUSCULAR; INTRAVENOUS at 10:12

## 2024-12-20 RX ADMIN — KETOROLAC TROMETHAMINE 30 MG: 30 INJECTION, SOLUTION INTRAMUSCULAR; INTRAVENOUS at 10:12

## 2024-12-20 RX ADMIN — FENTANYL CITRATE 50 MCG: 50 INJECTION, SOLUTION INTRAMUSCULAR; INTRAVENOUS at 10:12

## 2024-12-20 RX ADMIN — Medication 20 MG: at 10:12

## 2024-12-20 RX ADMIN — PHENYLEPHRINE HYDROCHLORIDE 200 MCG: 10 INJECTION INTRAVENOUS at 10:12

## 2024-12-20 RX ADMIN — CYCLOBENZAPRINE HYDROCHLORIDE 5 MG: 5 TABLET, FILM COATED ORAL at 12:12

## 2024-12-20 RX ADMIN — HYDROMORPHONE HYDROCHLORIDE 0.4 MG: 2 INJECTION INTRAMUSCULAR; INTRAVENOUS; SUBCUTANEOUS at 11:12

## 2024-12-20 RX ADMIN — SUGAMMADEX 200 MG: 100 INJECTION, SOLUTION INTRAVENOUS at 11:12

## 2024-12-20 RX ADMIN — PHENYLEPHRINE HYDROCHLORIDE 200 MCG: 10 INJECTION INTRAVENOUS at 11:12

## 2024-12-20 RX ADMIN — CEFAZOLIN 1 G: 2 INJECTION, POWDER, FOR SOLUTION INTRAMUSCULAR; INTRAVENOUS at 10:12

## 2024-12-20 RX ADMIN — OXYCODONE HYDROCHLORIDE 5 MG: 5 TABLET ORAL at 11:12

## 2024-12-20 RX ADMIN — SODIUM CHLORIDE: 0.9 INJECTION, SOLUTION INTRAVENOUS at 10:12

## 2024-12-20 RX ADMIN — PROPOFOL 150 MG: 10 INJECTION, EMULSION INTRAVENOUS at 10:12

## 2024-12-20 RX ADMIN — LIDOCAINE HYDROCHLORIDE 60 MG: 20 INJECTION, SOLUTION INTRAVENOUS at 10:12

## 2024-12-20 RX ADMIN — GLYCOPYRROLATE 0.2 MG: 0.2 INJECTION, SOLUTION INTRAMUSCULAR; INTRAVITREAL at 10:12

## 2024-12-20 RX ADMIN — MIDAZOLAM HYDROCHLORIDE 2 MG: 1 INJECTION, SOLUTION INTRAMUSCULAR; INTRAVENOUS at 10:12

## 2024-12-20 NOTE — ANESTHESIA POSTPROCEDURE EVALUATION
Anesthesia Post Evaluation    Patient: Johana Wright    Procedure(s) Performed: Procedure(s) (LRB):  LYSIS,ADHESIONS,SHOULDER,ARTHROSCOPIC (Left)  DEBRIDEMENT, SHOULDER, ARTHROSCOPIC (Left)  MANIPULATION, SHOULDER    Final Anesthesia Type: general      Patient location during evaluation: PACU  Patient participation: Yes- Able to Participate  Level of consciousness: awake and alert  Post-procedure vital signs: reviewed and stable  Pain management: adequate  Airway patency: patent    PONV status at discharge: No PONV  Anesthetic complications: no      Cardiovascular status: blood pressure returned to baseline  Respiratory status: unassisted  Hydration status: euvolemic  Follow-up not needed.              Vitals Value Taken Time   /84 12/20/24 1132   Temp 36.4 °C (97.6 °F) 12/20/24 1112   Pulse 96 12/20/24 1144   Resp 16 12/20/24 1143   SpO2 93 % 12/20/24 1144   Vitals shown include unfiled device data.      No case tracking events are documented in the log.      Pain/Guera Score: Pain Rating Prior to Med Admin: 7 (12/20/2024 11:43 AM)  Guera Score: 8 (12/20/2024 11:27 AM)

## 2024-12-20 NOTE — TRANSFER OF CARE
"Anesthesia Transfer of Care Note    Patient: Johana Wright    Procedure(s) Performed: Procedure(s) (LRB):  LYSIS,ADHESIONS,SHOULDER,ARTHROSCOPIC (Left)  DEBRIDEMENT, SHOULDER, ARTHROSCOPIC (Left)  MANIPULATION, SHOULDER    Patient location: PACU    Anesthesia Type: general    Transport from OR: Transported from OR on 2-3 L/min O2 by NC with adequate spontaneous ventilation    Post pain: adequate analgesia    Post assessment: no apparent anesthetic complications    Post vital signs: stable (Administered 200 mcg Sergio for soft B/P.)    Level of consciousness: awake and responds to stimulation    Nausea/Vomiting: no nausea/vomiting    Complications: none    Transfer of care protocol was followed      Last vitals: Visit Vitals  /69 (BP Location: Left arm, Patient Position: Lying)   Pulse 93   Temp 36.4 °C (97.6 °F) (Temporal)   Resp 16   Ht 5' 2" (1.575 m)   Wt 47.6 kg (104 lb 15 oz)   SpO2 97%   Breastfeeding No   BMI 19.19 kg/m²     "

## 2024-12-20 NOTE — PLAN OF CARE
Patient requests the presence of her sister at the time of discharge and for transportation to the home setting.

## 2024-12-20 NOTE — OP NOTE
[facility]  Surgery Department  Operative Note    SUMMARY     Date of Procedure: 12/20/2024     Procedure: Procedure(s) (LRB):  LYSIS,ADHESIONS,SHOULDER,ARTHROSCOPIC (Left)  DEBRIDEMENT, SHOULDER, ARTHROSCOPIC (Left)  MANIPULATION, SHOULDER     Surgeons and Role:     * Pal Vieyra MD - Primary    Assisting Surgeon: None    Pre-Operative Diagnosis: Adhesive capsulitis of left shoulder [M75.02]    Post-Operative Diagnosis: Post-Op Diagnosis Codes:     * Adhesive capsulitis of left shoulder [M75.02]    Anesthesia: General    Operative Findings (including complications, if any):  Left shoulder capsulitis with impingement    Description of Technical Procedures:  Patient brought the operating room underwent general anesthesia without difficulty exam anesthesia showed forward elevation 90 with a firm end and minimal rotation.  Under steady pressure manipulation performed full range of motion no instability.  She was carefully placed in beach chair position the left shoulder was prepped in usual fashion.  Using posterior viewing portal anterior working portal she had some adhesions and synovitis which was cleaned up with the electrothermal device and the shaver.  The biceps was in good shape labrum was in good shape and the joint itself was in good shape undersurface rotator cuff looked good so I made sure all the adhesions that were broken up were cleaned up and then everything was dry.  Attention then turned to subacromial space with a posterior viewing portal lateral working portal she did have some downsloping of the acromion which was irritating that rotator cuff she had a very small partial rotator cuff tear nothing need to be addressed other than decompression.  So decompression performed with a bur shaver and electrothermal device good clear space that point.  Instruments then removed.  PRP was placed into the joint hopefully to help her heal with the adhesions.  Monocryl was used  subcuticularly Steri-Strips on skin 0.5% ropivacaine was instilled the soft tissues placed in bulky sterile dressing simple arm sling brought to come sterile fashion     This performed with a poor recognition system.    Significant Surgical Tasks Conducted by the Assistant(s), if Applicable:  Positioning    Estimated Blood Loss (EBL): * No values recorded between 12/20/2024 10:34 AM and 12/20/2024 11:02 AM * minimal           Implants: * No implants in log *    Specimens:   Specimen (24h ago, onward)      None                    Condition: Good    Disposition: PACU - hemodynamically stable.    Attestation: Op Note Attestation: I was physically present and scrubbed for the entire procedure.

## 2024-12-20 NOTE — BRIEF OP NOTE
[facility] - Surgery ([place])  Brief Operative Note    Surgery Date: 12/20/2024     Surgeons and Role:     * Pal Vieyra MD - Primary    Assisting Surgeon: None    Pre-op Diagnosis:  Adhesive capsulitis of left shoulder [M75.02]    Post-op Diagnosis:  Post-Op Diagnosis Codes:     * Adhesive capsulitis of left shoulder [M75.02]    Procedure(s) (LRB):  LYSIS,ADHESIONS,SHOULDER,ARTHROSCOPIC (Left)  DEBRIDEMENT, SHOULDER, ARTHROSCOPIC (Left)  MANIPULATION, SHOULDER    Anesthesia: General    Operative Findings:  Left shoulder capsulitis impingement    Estimated Blood Loss: * No values recorded between 12/20/2024 10:34 AM and 12/20/2024 11:02 AM * minimal         Specimens:   Specimen (24h ago, onward)      None              Discharge Note    OUTCOME: Patient tolerated treatment/procedure well without complication and is now ready for discharge.    DISPOSITION: Home or Self Care    FINAL DIAGNOSIS:  Adhesive capsulitis of left shoulder    FOLLOWUP: In clinic    DISCHARGE INSTRUCTIONS:    Discharge Procedure Orders   Diet general     Leave dressing on - Keep it clean, dry, and intact until clinic visit     Call MD for:  temperature >100.4     Call MD for:  persistent nausea and vomiting     Call MD for:  severe uncontrolled pain     Call MD for:  difficulty breathing, headache or visual disturbances     Call MD for:  redness, tenderness, or signs of infection (pain, swelling, redness, odor or green/yellow discharge around incision site)     Call MD for:  hives     Call MD for:  persistent dizziness or light-headedness     Call MD for:  extreme fatigue     Sponge bath only until clinic visit

## 2024-12-20 NOTE — ANESTHESIA PROCEDURE NOTES
Intubation    Date/Time: 12/20/2024 10:25 AM    Performed by: Nikki Dennis CRNA  Authorized by: Derrick Suarez MD    Intubation:     Induction:  Intravenous    Intubated:  Postinduction    Mask Ventilation:  Easy mask    Attempts:  1    Attempted By:  CRNA    Method of Intubation:  Video laryngoscopy    Blade:  Mathew 2    Laryngeal View Grade: Grade I - full view of cords      Difficult Airway Encountered?: No      Complications:  None    Airway Device:  Oral endotracheal tube    Airway Device Size:  7.0    Style/Cuff Inflation:  Cuffed    Inflation Amount (mL):  3    Tube secured:  22    Secured at:  The lips    Placement Verified By:  Capnometry    Complicating Factors:  None    Findings Post-Intubation:  BS equal bilateral

## 2024-12-20 NOTE — PLAN OF CARE
Johana Diaz Kyle has met all discharge criteria from Phase II. Vital Signs are stable, ambulating  without difficulty. Discharge instructions given, patient verbalized understanding. Discharged from facility via wheelchair in stable condition.

## 2024-12-22 VITALS
OXYGEN SATURATION: 96 % | WEIGHT: 104.94 LBS | DIASTOLIC BLOOD PRESSURE: 66 MMHG | HEIGHT: 62 IN | HEART RATE: 106 BPM | TEMPERATURE: 99 F | BODY MASS INDEX: 19.31 KG/M2 | SYSTOLIC BLOOD PRESSURE: 115 MMHG | RESPIRATION RATE: 16 BRPM

## 2025-02-04 DIAGNOSIS — M79.642 BILATERAL HAND PAIN: Primary | ICD-10-CM

## 2025-02-04 DIAGNOSIS — M79.641 BILATERAL HAND PAIN: Primary | ICD-10-CM

## 2025-02-11 ENCOUNTER — TELEPHONE (OUTPATIENT)
Dept: ORTHOPEDICS | Facility: CLINIC | Age: 60
End: 2025-02-11
Payer: COMMERCIAL

## 2025-02-11 NOTE — TELEPHONE ENCOUNTER
Patient communication     Notified patient to stop at [facility] Location - 1st Floor [address], Please park in [place] [facility] and use [place] elevators 30 minutes prior to your appointment time for X-ray. After your X-ray please proceed to 9th Floor [address] for Appointment on 2/12/25 with Dr. Nowak for x-rays.     Made them aware that this is not a scheduled xray appointment and they might be running behind as they are considered a walk-in xray.    Verbalized the Following:  *Please arrive at your informed time above, if you are more than 15 Minutes late to your appointment with Dr. Nowak we will have to reschedule your appointment. This will allow you to be seen in a timely manor and be conscious to other patients being seen that same day*

## 2025-02-12 ENCOUNTER — HOSPITAL ENCOUNTER (OUTPATIENT)
Dept: RADIOLOGY | Facility: OTHER | Age: 60
Discharge: HOME | End: 2025-02-12
Attending: ORTHOPAEDIC SURGERY
Payer: COMMERCIAL

## 2025-02-12 ENCOUNTER — OFFICE VISIT (OUTPATIENT)
Dept: ORTHOPEDICS | Facility: CLINIC | Age: 60
End: 2025-02-12
Payer: COMMERCIAL

## 2025-02-12 DIAGNOSIS — G89.29 CHRONIC PAIN OF RIGHT HAND: ICD-10-CM

## 2025-02-12 DIAGNOSIS — M72.0 DUPUYTREN'S CONTRACTURE OF LEFT HAND: ICD-10-CM

## 2025-02-12 DIAGNOSIS — M79.641 CHRONIC PAIN OF RIGHT HAND: ICD-10-CM

## 2025-02-12 DIAGNOSIS — M79.642 BILATERAL HAND PAIN: ICD-10-CM

## 2025-02-12 DIAGNOSIS — M72.0 DUPUYTREN'S DISEASE OF PALM OF RIGHT HAND: Primary | ICD-10-CM

## 2025-02-12 DIAGNOSIS — M79.641 BILATERAL HAND PAIN: ICD-10-CM

## 2025-02-12 DIAGNOSIS — M72.0 DUPUYTREN'S CONTRACTURE OF RIGHT HAND: ICD-10-CM

## 2025-02-12 PROCEDURE — 73130 X-RAY EXAM OF HAND: CPT | Mod: 26,50,, | Performed by: RADIOLOGY

## 2025-02-12 PROCEDURE — 99999 PR PBB SHADOW E&M-EST. PATIENT-LVL V: CPT | Mod: PBBFAC,,, | Performed by: ORTHOPAEDIC SURGERY

## 2025-02-12 PROCEDURE — 1159F MED LIST DOCD IN RCRD: CPT | Mod: CPTII,S$GLB,, | Performed by: ORTHOPAEDIC SURGERY

## 2025-02-12 PROCEDURE — 73130 X-RAY EXAM OF HAND: CPT | Mod: TC,50,FY

## 2025-02-12 PROCEDURE — 1160F RVW MEDS BY RX/DR IN RCRD: CPT | Mod: CPTII,S$GLB,, | Performed by: ORTHOPAEDIC SURGERY

## 2025-02-12 PROCEDURE — 99215 OFFICE O/P EST HI 40 MIN: CPT | Mod: S$GLB,,, | Performed by: ORTHOPAEDIC SURGERY

## 2025-02-12 NOTE — PROGRESS NOTES
Johana Wright presents for follow up evaluation of   Encounter Diagnoses   Name Primary?    Dupuytren's contracture of left hand     Dupuytren's disease of palm of right hand Yes    Dupuytren's contracture of right hand     Chronic pain of right hand      History of Present Illness    CHIEF COMPLAINT:  - Dupuytren's contracture affecting multiple fingers of the right hand, with worsening symptoms since previous surgery.    HPI:  Patient presents for evaluation of Dupuytren's contracture affecting multiple fingers on her right hand. She reports a recent fall in a grocery store which delayed her previous appointment. She expresses concern that her right hand condition is worsening, particularly noting it's her primary hand.    She describes difficulty with , stating she has to be careful due to poor  when holding stemless drinks. She denies dropping things but notes functional limitations. The index finger on the right hand is starting to contract, with a palpable cord and knot. The ring finger is also affected, with a thick cord noticeable. The pinky finger shows some involvement but currently maintains good motion. She mentions occasional inflammation in the pad of the pinky finger and a thick cord tethering her thumb, which may be closing down her web space.    She reports no significant pain but expresses concern about the progressive nature of the condition, stating she noticed it more after her left hand improved. She denies any numbness but mentions occasional Raynaud's-like symptoms. Her last surgery on her left hand resulted in significant improvement, which she describes as remarkable.    Patient denies dropping things, significant pain, or having any numbness in the affected hand.    SURGICAL HISTORY:  - Left hand surgery for Dupuytren's contracture: 6 weeks ago  - Right hand surgery for Dupuytren's contracture: date not specified    WORK STATUS:  - Working at a [employer] during [place] [place]  -  Planning a personal trip to [facility] in September or October       Vitals:    02/12/25 1019   PainSc:   1   PainLoc: Hand       PE:    AA&O x 4.  NAD  HEENT:  NCAT, sclera nonicteric  Lungs:  Respirations are equal and unlabored.  CV:  2+ bilateral upper and lower extremity pulses.  MSK:   Dupuytren's cords and contracture of right thumb webspace, right index long and ring PIP joints 45°, Near full extension of the left small ring and long fingers, stiffness with flexion of the fingers, especially small and ring finger PIP joints. Neurovascularly intact bilaterally.  5/5 thenar and intrinsic musculature strength.             Physical Exam              Diagnostic studies and other clinical records review:  X-rays AP, lateral and oblique bilateral hand taken today are independently reviewed by me and shows bilateral hand finger contractures.     Assessment/Plan:   Encounter Diagnoses   Name Primary?    Dupuytren's contracture of left hand     Dupuytren's disease of palm of right hand Yes    Dupuytren's contracture of right hand     Chronic pain of right hand      Plan: The patient and I had a thorough discussion today. We discussed the working diagnosis as well as several other potential alternative diagnoses. Treatment options were discussed, both conservative and surgical. Conservative treatment options would include things such as activity modifications, workplace modifications, a period of rest, oral vs topical OTC and prescription anti-inflammatory medications, occupational therapy, splinting/bracing, immobilization, corticosteroid injections, and others. Surgical options were discussed as well.     At this time, the patient has exhausted conservative measures and would like to proceed with surgery. Surgery would include right thumb, index, long and ring finger dupuytrens removal, partial fasciectomy and contracture release. Consent signed today in clinic. Light use of the hand will be indicated for the first 4-6  weeks     We have discussed risks, benefits and alternatives of hand surgery which include but are not limited to blood clots in the legs that can travel to the lungs (pulmonary embolism). Pulmonary embolism can cause shortness of breath, chest pain, and even shock. Other risks include urinary tract infection, nausea and vomiting (usually related to pain medication), chronic pain, bleeding, nerve damage, blood vessel injury, scarring and infection of the hand which can require re-operation. Furthermore, the risks of anesthesia include potential heart, lung, kidney, and liver damage.  Informed consent was obtained. she understands and would like to proceed with surgery in the near future.             Aysha Nowak MD    Please be aware that this note has been generated with the assistance of [employer] voice-to-text.  Please excuse any spelling or grammatical errors.  This note was generated with the assistance of ambient listening technology. Verbal consent was obtained by the patient and accompanying visitor(s) for the recording of patient appointment to facilitate this note. I attest to having reviewed and edited the generated note for accuracy, though some syntax or spelling errors may persist. Please contact the author of this note for any clarification.

## 2025-02-12 NOTE — LETTER
[facility]  [address], [address]  [facility] [address]  Phone: [phone]   I am involved the care of out mutual patient Johana Wright.  They have elected to go forward with surgery to address right thumb, index, long and ring finger dupuytrens removal, partial fasciectomy and contracture release. on 4/8/25. Regional/MAC anesthesia about 3.5hrs. I request your assistance with pre-operative evaluation and determination surgical optimization.    Please send Pre operative evaluation/clearance letter to (F) [phone].    I appreciate you assistance and please don't hesitate to call or reach out with questions/concerns.    Sincerely,        Aysha Nowak MD  Hand & Wrist Orthopaedic Surgery  02/12/2025  P: [phone]  F: [phone]

## 2025-02-12 NOTE — PATIENT INSTRUCTIONS
[facility]  HAND CLINIC SURGERY INSTRUCTIONS  Aysha Nowak MD  Date of Surgery: 4/8/25    Location of Surgery:      [facility] [place] [address]    PRE-OPERATIVE INSTRUCTIONS:    Dr. Nowak's clinic staff will call you THE DAY BEFORE SURGERY with your arrival time. You will be notified in the afternoon between 11:00AM - 5:00pm. We will attempt to provide your arrival time earlier and will call you if this is at all possible.     DO NOT eat or drink after midnight, this includes gum/hard candy, coffee.   You may drink gatorade and water only up to 2 hours prior to arrival, NOTHING ELSE.    You ARE NOT to drive or take an Uber/Lyft/taxi home from surgery. Please arrange a friend/family member to accompany you at the surgery center and drive you home.  Transportation: sister    PLEASE REMOVE nail polish and/or artificial nails prior to your surgery date if applicable. NA [facility] PAC[facility] Visitor Policy:    Each patient will be allowed 2 visitors with 1 visitor at a time. Only 1 swap out allowed.   Pediatric patients: Both parents are allowed if present.   Post-Op: If there is more than 1 visitor, the person that is the main caregiver will need to be available for d/c instructions should visit 2nd, and stay with the patient until d/c.  All visitors must be accompanied in and out with an employee.       PRE-OPERATIVE MEDICATION INSTRUCTIONS:    If you are diabetic, DO NOT take any diabetic medication the night before surgery or morning of surgery. If you are type I diabetic on an insulin pump, please bring your monitor the morning of surgery.   NA    MUST HOLD SEMAGLUTIDE MEDICATIONS 7 DAYS PRIOR TO SURGERY, examples: MounjaroManuelempic, Trulicity.   NA    Please HOLD Vitamins 5 days prior to surgery or sooner, CONTINUE Vitamin D up until the AM of your surgery.    Avoid anti-inflammatory medications 5 days before your surgery. This includes Aleve/Naproxen,  Celebrex, Meloxicam/Mobic, Voltaren/Diclofenac.   You may dose ibuprofen/Advil/Motrin up until the day before your surgery.  You may take extra strength Tylenol/Acetaminophen.    HOLD ALL OTHER MEDICATIONS AM OF SURGERY THAT ARE NOT DISCUSSED ABOVE        POST-OPERATIVE MEDICATION INSTRUCTIONS:    Your post-operative pain medication will be DELIVERED BEDSIDE to you at the surgery center by the [facility]. You DO NOT need to pick this medication up from the [facility].     TAKE post-operative pain medication as directed.   You may also take over-the-counter extra strength Tylenol/acetaminophen as directed on the bottle for breakthrough pain between dosed of prescribed pain medication.   Please note, some pain medications contain Tylenol/acetaminophen.   DO NOT exceed 3000mg of Tylenol/acetaminophen in 1 day.  You may also use an over-the-counter anti-inflammatory medication also known as an NSAID,  (Aleve/Naproxen) as directed on the bottle for breakthrough pain between doses of prescribed pain medication.  DO NOT take NSAIDs if you have been previously instructed not to by a physician.     POST-OPERATIVE INSTRUCTIONS:    DO NOT let your operative area become wet, Your dressings/bandages/splints must remain dry.   Recommend waterproof arm cast cover to be worn when showering and bathing and can be purchased on Amazon. Garbage bags, plastic shopping bags, or umbrella bags can also be used instead.    DO NOT remove any post-operative dressings/bandages/splints until you are seen by Mayelin Sharp PA-C, Dr. Nowak or Occupational Therapy   These dressings/bandages/splints are in place to keep the operative site clean, dry, and in optimal healing position     ELEVATE your hand/arm above the level of your heart as much as possible to decrease post-operative swelling for first 48 hours.  Swelling after surgery is TO BE EXPECTED.      ICE the operative site following surgery for 20-30 minutes, 4-5 times per  day.  Be sure to have a towel between your dressings/bandages/splint to keep from getting wet.    MOVE the parts of your hand/arm that are not immobilized in a sling or splint.   Make a gentle fist with your fingers, bend/straighten your elbow, etc.   DO NOT attempt any strengthening exercises (hand putty, exercise balls, etc) on your operative side as this can increase swelling.     *CALL the [facility] at [phone]*  If at any time following surgery your dressings/bandages/splints: get wet, come loose, feels tight, feels uncomfortable.  Or if you are concerned about possible infection, worsening pain, worsening swelling or have any other concerns regarding your surgery.   Signs of infection:  fever (over 100.3), chills, body aches, increased warmth, redness, significant increase in swelling & pain at surgical site.     OUTPATIENT FOLLOW UP:  YOU WILL BE SEEN FIRST BY OCCUPATIONAL THERAPY 3-5 DAYS AFTER YOUR SURGERY. *Your splint / soft dressing will be removed as well as your sutures if applicable.  YOU WILL THEN BE SEEN BY PATSY SIMS PA-C IN CLINIC 13 days AFTER SURGERY AND DR. ESTRADA IN CLINIC 6 WEEKS AFTER SURGERY    Patient IQ (Survey)  A quick questionnaire (2-3 minutes) will be sent to you via text message or email in the next few hours. It will only take a few minutes to complete!  We care about your health, answering the questionnaire allows us to track your progress through your recovery to provide the best outcome for your recovery.    FMLA or Disability paperwork can be sent to [facility]  If you feel you will need accommodations at work for more than 2 weeks please ask your HR department for paperwork so that they can accommodate your restrictions. * Only needed if you are going to be out of work 2 weeks or more*\  Please send the paperwork filled out with your information to: Fax: [phone] or Email [email or web address]   This process should take 8-10  business days. If you have any concerns or need to check the status of your request please contact HIM at the email address or phone number listed below as once it leaves this department we do not have access to the status of your request. Phone: [phone]

## 2025-02-21 ENCOUNTER — TELEPHONE (OUTPATIENT)
Dept: PRIMARY CARE CLINIC | Facility: CLINIC | Age: 60
End: 2025-02-21
Payer: COMMERCIAL

## 2025-02-21 ENCOUNTER — TELEPHONE (OUTPATIENT)
Dept: ORTHOPEDICS | Facility: CLINIC | Age: 60
End: 2025-02-21
Payer: COMMERCIAL

## 2025-02-21 NOTE — TELEPHONE ENCOUNTER
Rescheduled right thumb, index, long and ring finger dupuytrens removal, partial fasciectomy and contracture release.     Sent message to [facility] surgery scheduling   Sent message to OT [facility] to reschedule   Rescheduled postop appts    Baljit Camargo MS, OTC   Sports Medicine Assistant   [facility]  (P) [phone]  (F) [phone]

## 2025-02-21 NOTE — TELEPHONE ENCOUNTER
----- Message from Mario Jimenez MD sent at 2/13/2025  6:47 PM CST -----  Regarding: would request patient get an pre-op appt please.  would request patient get an pre-op appt please.  ----- Message -----  From: Baljit Camargo  Sent: 2/12/2025  10:49 AM CST  To: Mario Jimenez MD

## 2025-03-05 ENCOUNTER — OFFICE VISIT (OUTPATIENT)
Dept: PRIMARY CARE CLINIC | Facility: CLINIC | Age: 60
End: 2025-03-05
Payer: COMMERCIAL

## 2025-03-05 VITALS
HEIGHT: 62 IN | DIASTOLIC BLOOD PRESSURE: 70 MMHG | RESPIRATION RATE: 16 BRPM | OXYGEN SATURATION: 98 % | BODY MASS INDEX: 17.95 KG/M2 | TEMPERATURE: 98 F | HEART RATE: 83 BPM | WEIGHT: 97.56 LBS | SYSTOLIC BLOOD PRESSURE: 108 MMHG

## 2025-03-05 DIAGNOSIS — F41.9 ANXIETY: ICD-10-CM

## 2025-03-05 DIAGNOSIS — M72.0 DUPUYTREN'S CONTRACTURE OF LEFT HAND: ICD-10-CM

## 2025-03-05 DIAGNOSIS — M25.642 STIFFNESS OF LEFT HAND JOINT: ICD-10-CM

## 2025-03-05 DIAGNOSIS — Z00.00 ANNUAL PHYSICAL EXAM: ICD-10-CM

## 2025-03-05 DIAGNOSIS — D84.9 IMMUNE DEFICIENCY DISORDER: ICD-10-CM

## 2025-03-05 DIAGNOSIS — Z01.818 PREOP EXAMINATION: Primary | ICD-10-CM

## 2025-03-05 PROCEDURE — 99999 PR PBB SHADOW E&M-EST. PATIENT-LVL V: CPT | Mod: PBBFAC,,, | Performed by: STUDENT IN AN ORGANIZED HEALTH CARE EDUCATION/TRAINING PROGRAM

## 2025-03-05 NOTE — PROGRESS NOTES
Assessment:       1. Preop examination    2. Annual physical exam    3. Stiffness of left hand joint    4. Dupuytren's contracture of left hand    5. Immune deficiency disorder    6. Anxiety           Plan:     Assessment & Plan      IMPRESSION:  - Reviewed patient's medical history and recent orthopedic notes for upcoming right hand surgery  - Performed pre-operative questionnaire to assess surgical risk factors  - Noted patient's history of multiple prior surgeries without complications  - Reviewed recent lab results: creatinine 0.7, hemoglobin 13.7 (12/16/2024)  - Calculated Mallampati score (Class 1) for anesthesia planning  - Performed brief physical exam: heart and lung sounds normal, capillary refill in fingertips adequate  - Assessed progression of patient's hand contractures and previous surgical interventions  - Reviewed May EKG, noted as normal  - Considered need for updated lipid panel and thyroid studies    PLAN SUMMARY:  - Obtain lipid panel and thyroid studies (TSH, T3, T4) at [facility]  - Continue Prevagen infusions every other month for antibody deficiency  - Request [facility] lab records from Dr. Power's office  - Follow up to review lab results    Preop examination  Any prior reaction to anaesthesia: no issues before.  History of prolonged bleeding after surgery or injury?:  no hx of bleeding excessively  High risk surgery?:  No  History of MI, abnormal stress, anginal chest pain, stent, NTG use?: No  History of CHF?: No  History of TIA or stroke?:  No  On insulin?:  No  Pre-op Cr >2 mg/dL?:  0.7 on 12/16/24.  Hb?  14.0 on 12/16/24.      Mallampati score:   Class 1  (class 1 complete visualization to class 4 obscure)     Patient is clear for surgery at this time.       Dupuytren's contracture of right hand  - pre-op clearance.  - states that surgery to left hand was a 3 hour process and was relatively successful.  - on exam today, patient is well appearing, VS stable. Labs from Dec 2024 ressuring.  -  "clearing for surgery at this time.       Immune deficiency disorder  - Continued Prevagen infusions every other month for antibody deficiency.  - Requested [facility] lab records for lipid panel and thyroid studies (TSH, T3, T4) from Dr. Power's office.    Annual physical exam  - reviewed recent labs which were reassuring.  EKG from May 2024 was NSR.  - Follow up to obtain lipid panel and thyroid studies at [facility] if not available from Dr. Power's records.       Anxiety    - taking Effexor 25mg nightly from outside provider.           This note was generated with the assistance of ambient listening technology. Verbal consent was obtained by the patient and accompanying visitor(s) for the recording of patient appointment to facilitate this note. I attest to having reviewed and edited the generated note for accuracy, though some syntax or spelling errors may persist. Please contact the author of this note for any clarification.      Subjective:           Patient ID: Johana Wright   Age:  59 y.o.  Sex: female     Chief Complaint:   Pre-op Exam (Right hand)      History of Present Illness:    Johana Wright is a 59 y.o. female who presents today with a chief complaint of Pre-op Exam (Right hand)  .    Hx of dupuytrens contracture bilaterally.  Had previous surgery to the left hand and is now planning surgery to the right hand.      Per Ortho note on 2/12/25, "Surgery would include right thumb, index, long and ring finger dupuytrens removal, partial fasciectomy and contracture release. "      Any prior reaction to anaesthesia: no issues before.  History of prolonged bleeding after surgery or injury?:  no hx of bleeding excessively  High risk surgery?:  No  History of MI, abnormal stress, anginal chest pain, stent, NTG use?: No  History of CHF?: No  History of TIA or stroke?:  No  On insulin?:  No  Pre-op Cr >2 mg/dL?:  0.7 on 12/16/24.  Hb?  14.0 on 12/16/24.    Mallampati score:   Class 1  (class 1 complete " visualization to class 4 obscure)              History of Present Illness    CHIEF COMPLAINT:  Johana presents today for pre-operative clearance for right hand surgery    HAND CONDITION:  She has a bilateral hand condition that initially began in 2018. Her left hand underwent extensive surgery in 2019 by a different hand surgeon, which was unsuccessful. The procedure lasted three hours instead of the planned hour and a half due to numerous nerves involved. She reports rapid progression of finger contracture that developed almost overnight. Due to the severity of finger bend, injections were not considered as a treatment option.    SURGICAL HISTORY:  She has undergone at least 10 prior anesthesia administrations, tolerating them well without complications. She denies any history of prolonged bleeding following surgeries, procedures, or injuries.    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 She denies any history of cardiac issues including heart attacks, abnormal stress test, cardiac stents, or nitroglycerin use. She also denies history of stroke.      ROS:  Cardiovascular: -chest pain  Musculoskeletal: +joint pain           Review of Systems   Constitutional:  Negative for activity change, fatigue, fever and unexpected weight change.        Frequent illnesses due to immune compromised state.   HENT:  Negative for congestion, nosebleeds, sinus pressure and sneezing.    Respiratory:  Negative for cough, shortness of breath and wheezing.    Cardiovascular:  Negative for chest pain, palpitations and leg swelling.   Gastrointestinal:  Negative for abdominal distention, constipation, diarrhea and nausea.   Genitourinary:  Negative for difficulty urinating and dysuria.   Musculoskeletal:  Positive for joint swelling (finger/hand contracture). Negative for back pain and gait problem.   Skin:   "Negative for pallor and rash.   Neurological:  Negative for weakness and numbness.   Psychiatric/Behavioral:  Negative for agitation. The patient is not nervous/anxious.            Objective:        Vitals:    03/05/25 0847   BP: 108/70   BP Location: Left arm   Patient Position: Sitting   Pulse: 83   Resp: 16   Temp: 98.3 °F (36.8 °C)   TempSrc: Oral   SpO2: 98%   Weight: 44.3 kg (97 lb 8.9 oz)   Height: 5' 2" (1.575 m)       Body mass index is 17.84 kg/m².      Physical Exam  Constitutional:       General: She is not in acute distress.     Appearance: Normal appearance.   HENT:      Head: Normocephalic and atraumatic.      Right Ear: External ear normal.      Left Ear: External ear normal.      Nose: No rhinorrhea.      Mouth/Throat:      Mouth: Mucous membranes are moist.   Eyes:      Extraocular Movements: Extraocular movements intact.      Conjunctiva/sclera: Conjunctivae normal.   Cardiovascular:      Rate and Rhythm: Normal rate and regular rhythm.      Pulses: Normal pulses.      Heart sounds: No murmur heard.  Pulmonary:      Effort: Pulmonary effort is normal. No respiratory distress.      Breath sounds: No wheezing.   Abdominal:      Tenderness: There is no right CVA tenderness or left CVA tenderness.   Musculoskeletal:         General: Deformity (contracture of fingers on right hand.  Some contracture to left 5th digit as well.) present.      Right lower leg: No edema.      Left lower leg: No edema.   Skin:     Capillary Refill: Capillary refill takes less than 2 seconds.      Coloration: Skin is not jaundiced.      Findings: No bruising.   Neurological:      General: No focal deficit present.      Mental Status: She is alert and oriented to person, place, and time.      Motor: No weakness.      Gait: Gait normal.   Psychiatric:         Mood and Affect: Mood normal.         Physical Exam    Cardiovascular: Heart sounds normal. Capillary refill in fingertips normal.  Lungs: Lungs sound normal.           " "    Past Medical History[1]    Lab Results   Component Value Date     12/16/2024    K 4.2 12/16/2024     12/16/2024    CO2 28 12/16/2024    BUN 12 12/16/2024    CREATININE 0.7 12/16/2024    ANIONGAP 11 12/16/2024     Lab Results   Component Value Date    HGBA1C 5.5 05/01/2024     No results found for: "BNP", "BNPTRIAGEBLO"    Lab Results   Component Value Date    WBC 5.65 12/16/2024    HGB 14.0 12/16/2024    HCT 42.1 12/16/2024     12/16/2024    GRAN 3.7 12/16/2024    GRAN 65.1 12/16/2024     No results found for: "CHOL", "HDL", "LDLCALC", "TRIG"     Encounter Medications[2]              [1]   Past Medical History:  Diagnosis Date    Abnormal Pap smear of cervix age 29    cryo    Breast disorder    [2]   Outpatient Encounter Medications as of 3/5/2025   Medication Sig Dispense Refill    butalbital-acetaminophen-caffeine -40 mg (FIORICET, ESGIC) -40 mg per tablet Take 1 tablet by mouth every 48 hours as needed for Headaches. 30 tablet 0    ergocalciferol (VITAMIN D2) 50,000 unit Cap Take 50,000 Units by mouth every 7 days.      HYDROcodone-acetaminophen (NORCO)  mg per tablet Take 1 tablet by mouth every 6 (six) hours as needed for Pain. 30 tablet 0    ibuprofen (ADVIL,MOTRIN) 800 MG tablet Take 1 tablet (800 mg total) by mouth every 8 (eight) hours as needed for Pain. 30 tablet 0    meloxicam (MOBIC) 15 MG tablet Take 15 mg by mouth daily as needed.      spironolactone (ALDACTONE) 50 MG tablet Take 50 mg by mouth once daily.      tretinoin (RETIN-A) 0.05 % cream   12    venlafaxine (EFFEXOR) 25 MG Tab Take 50 mg by mouth every evening.      immun glob G,IgG,/pro/IgA 0-50 (PRIVIGEN IV) Inject into the vein every 28 days. Per Ab deficiency (Patient not taking: Reported on 3/5/2025)       No facility-administered encounter medications on file as of 3/5/2025.     "

## 2025-03-05 NOTE — PATIENT INSTRUCTIONS
IMPRESSION:  - Reviewed patient's medical history and recent orthopedic notes for upcoming right hand surgery  - Performed pre-operative questionnaire to assess surgical risk factors  - Noted patient's history of multiple prior surgeries without complications  - Reviewed recent lab results: creatinine 0.7, hemoglobin 13.7 (12/16/2024)  - Calculated Mallampati score (Class 1) for anesthesia planning  - Performed brief physical exam: heart and lung sounds normal, capillary refill in fingertips adequate  - Assessed progression of patient's hand contractures and previous surgical interventions  - Reviewed May EKG, noted as normal  - Considered need for updated lipid panel and thyroid studies    PLAN SUMMARY:  - Obtain lipid panel and thyroid studies (TSH, T3, T4) at [email or web address]  - Continue Prevagen infusions every other month for antibody deficiency  - Request [facility] lab records from Dr. Power's office  - Follow up to review lab results    Preop examination  Any prior reaction to anaesthesia: no issues before.  History of prolonged bleeding after surgery or injury?:  no hx of bleeding excessively  High risk surgery?:  No  History of MI, abnormal stress, anginal chest pain, stent, NTG use?: No  History of CHF?: No  History of TIA or stroke?:  No  On insulin?:  No  Pre-op Cr >2 mg/dL?:  0.7 on 12/16/24.  Hb?  14.0 on 12/16/24.      Mallampati score:   Class 1  (class 1 complete visualization to class 4 obscure)     Patient is clear for surgery at this time.       Dupuytren's contracture of right hand  - pre-op clearance.  - states that surgery to left hand was a 3 hour process and was relatively successful.  - on exam today, patient is well appearing, VS stable. Labs from Dec 2024 ressuring.  - clearing for surgery at this time.       Immune deficiency disorder  - Continued Prevagen infusions every other month for antibody deficiency.  - Requested [facility] lab records for lipid panel and thyroid studies (TSH, T3, T4) from   Tono's office.    Annual physical exam  - reviewed recent labs which were reassuring.  EKG from May 2024 was NSR.  - Follow up to obtain lipid panel and thyroid studies at [facility] if not available from Dr. Power's records.       Anxiety    - taking Effexor 25mg nightly from outside provider.

## 2025-04-08 ENCOUNTER — PATIENT OUTREACH (OUTPATIENT)
Dept: ADMINISTRATIVE | Facility: HOSPITAL | Age: 60
End: 2025-04-08
Payer: COMMERCIAL

## 2025-04-08 NOTE — PROGRESS NOTES
Health Maintenance Due   Topic Date Due    Hepatitis C Screening  Never done    HIV Screening  Never done    TETANUS VACCINE  Never done    Shingles Vaccine (1 of 2) Never done    Pneumococcal Vaccines (Age 50+) (1 of 2 - PCV) Never done    Colorectal Cancer Screening  Never done    COVID-19 Vaccine (2 - Pfizer risk series) 10/14/2021    Mammogram  12/15/2021    RSV Vaccine (Age 60+ and Pregnant patients) (1 - Risk 60-74 years 1-dose series) Never done    Influenza Vaccine (1) 09/01/2024     Immunizations - reviewed and updated   Care Everywhere - triggered   Care Teams - updated   Outreach - Mammogram gap report reviewed. Portal message sent in regards to scheduling   [facility] reviewed. Lipid panel done 12/6/2023 uploaded to media manager.  updated

## 2025-04-11 ENCOUNTER — PATIENT MESSAGE (OUTPATIENT)
Dept: PREADMISSION TESTING | Facility: HOSPITAL | Age: 60
End: 2025-04-11
Payer: COMMERCIAL

## 2025-04-11 NOTE — ANESTHESIA PAT ROS NOTE
04/11/2025  Johana Wright is a 60 y.o., female.      Pre-op Assessment    I have reviewed the Patient Summary Reports.       I have reviewed the Medications.     Review of Systems  Anesthesia Hx:  No problems with previous Anesthesia   History of prior surgery of interest to airway management or planning:  Previous anesthesia: General, MAC, Nerve Block 12/20/24 shoulder lysis adhesions with general anesthesia.  Procedure performed at an [facility].     for 5/16/24 Dupuytren release.  Procedure performed at an [facility].  5/16/24 Dupuytren release with MAC.  Procedure performed at an [facility]. Airway issues documented on chart review include mask, easy, GETA, videolaryngoscope used  , view on video-laryngoscopy Grade I       Social:  Non-Smoker, Alcohol Use       Hematology/Oncology:                   Hematology Comments: Immune deficiency disorder                    Cardiovascular:                hyperlipidemia                               Musculoskeletal:     Dupuytren's disease of palm of right hand  Dupuytren's contracture of left hand  Dupuytren's contracture of right hand  Chronic pain of right hand              Neurological:      Headaches                                 Psych:   anxiety depression                 Past Medical History:   Diagnosis Date    Abnormal Pap smear of cervix age 29    cryo    Breast disorder      Past Surgical History:   Procedure Laterality Date    ARTHROSCOPIC DEBRIDEMENT OF SHOULDER Left 12/20/2024    Procedure: DEBRIDEMENT, SHOULDER, ARTHROSCOPIC;  Surgeon: Pal Vieyra MD;  Location: [facility];  Service: Orthopedics;  Laterality: Left;    BREAST SURGERY  07/2023    implant removal and replacment    BREAST SURGERY Bilateral 01/2021    implant removal and replacement    CERVICAL BIOPSY  W/ LOOP ELECTRODE EXCISION      07/09/2020    COLD  KNIFE CONIZATION OF CERVIX N/A 07/09/2020    Procedure: CONE BIOPSY, CERVIX, USING COLD KNIFE;  Surgeon: Talisha Joshua MD;  Location: [facility] OR;  Service: OB/GYN;  Laterality: N/A;    DUPUYTREN CONTRACTURE RELEASE Left 5/16/2024    Procedure: RELEASE, DUPUYTREN THUMB LONG RING & SMALL FINGER;  Surgeon: Aysha Nowak MD;  Location: [facility] OR;  Service: Orthopedics;  Laterality: Left;    HAND SURGERY      LASER ABLATION OF THE CERVIX  age 29    cryo ?    LYSIS, ADHESIONS, SHOULDER, ARTHROSCOPIC Left 12/20/2024    Procedure: LYSIS,ADHESIONS,SHOULDER,ARTHROSCOPIC;  Surgeon: Pal Vieyra MD;  Location: [facility] OR;  Service: Orthopedics;  Laterality: Left;    MANIPULATION OF SHOULDER JOINT  12/20/2024    Procedure: MANIPULATION, SHOULDER;  Surgeon: Pal Vieyra MD;  Location: [facility] OR;  Service: Orthopedics;;    RECTAL PROLAPSE REPAIR  04/2019    RELEASE OF CONTRACTURE OF JOINT Left 5/16/2024    Procedure: RELEASE, CONTRACTURE, JOINT THUMB LONG, RING AND SMALL FINGER;  Surgeon: Aysha Nowak MD;  Location: [facility] OR;  Service: Orthopedics;  Laterality: Left;    SINUS SURGERY      TONSILLECTOMY, ADENOIDECTOMY      TUBAL LIGATION       Anesthesia Assessment: Preoperative EQUATION    Planned Procedure: Procedure(s) (LRB):  RELEASE, DUPUYTREN CONTRACTURE THUMB, INDEX, LONG AND RING FINGER (Right)  Requested Anesthesia Type:Regional  Surgeon: Aysha Nowak MD  Service: Orthopedics  Known or anticipated Date of Surgery:4/24/2025    Surgeon notes: reviewed    Electronic QUestionnaire Assessment completed via nurse interview with patient.        Triage considerations:     The patient has no apparent active cardiac condition (No unstable coronary Syndrome such as severe unstable angina or recent [<1 month] myocardial infarction, decompensated CHF, severe valvular   disease or significant arrhythmia)    Previous anesthesia records:GETA, LMA General, MAC, and No problems nerve block for post op pain mgt.    Last  "PCP note: within 3 months   Subspecialty notes: Ortho    Other important co-morbidities: HLD   EKG 5/1/24  Vent. Rate : 088 BPM     Atrial Rate : 088 BPM      P-R Int : 146 ms          QRS Dur : 078 ms       QT Int : 364 ms       P-R-T Axes : 074 079 071 degrees      QTc Int : 440 ms     Normal sinus rhythm with sinus arrhythmia   Normal ECG   When compared with ECG of 14-DEC-2011 14:05,   No significant change was found   Confirmed by Stacey JEFFERSON, [facility] YAMIL ([id number]) on 5/1/2024 3:19:32 PM       Tests already available:  Results have been reviewed.             Instructions given. (See in Nurse's note) Pre op medication instructions sent via portal message.  RN called pt [phone]; she confirms she is no longer taking privigen injections.    Optimization:  Anesthesia Preop Clinic Assessment not Indicated    Medical Opinion Indicated: no       Sub-specialist consult indicated:  no       Plan:   No pre op medical clearance requested.    Navigation: Pt cleared for surgery by PCP Dr. Jimenez 3/5/25.     Ht: 5'2"  Wt: 44.3 kg (97 lb)  BMI: 19.31  "

## 2025-04-23 ENCOUNTER — ANESTHESIA EVENT (OUTPATIENT)
Dept: SURGERY | Facility: HOSPITAL | Age: 60
End: 2025-04-23
Payer: COMMERCIAL

## 2025-04-23 ENCOUNTER — TELEPHONE (OUTPATIENT)
Dept: ORTHOPEDICS | Facility: CLINIC | Age: 60
End: 2025-04-23
Payer: COMMERCIAL

## 2025-04-23 DIAGNOSIS — M72.0 DUPUYTREN'S CONTRACTURE OF RIGHT HAND: ICD-10-CM

## 2025-04-23 DIAGNOSIS — M72.0 DUPUYTREN'S DISEASE OF PALM OF RIGHT HAND: Primary | ICD-10-CM

## 2025-04-23 RX ORDER — DOCUSATE SODIUM 100 MG/1
100 CAPSULE, LIQUID FILLED ORAL 2 TIMES DAILY
Qty: 30 CAPSULE | Refills: 1 | Status: SHIPPED | OUTPATIENT
Start: 2025-04-23

## 2025-04-23 RX ORDER — HYDROCODONE BITARTRATE AND ACETAMINOPHEN 5; 325 MG/1; MG/1
1 TABLET ORAL EVERY 6 HOURS PRN
Qty: 30 TABLET | Refills: 0 | Status: SHIPPED | OUTPATIENT
Start: 2025-04-23

## 2025-04-23 RX ORDER — ONDANSETRON HYDROCHLORIDE 8 MG/1
8 TABLET, FILM COATED ORAL EVERY 6 HOURS PRN
Qty: 30 TABLET | Refills: 0 | Status: SHIPPED | OUTPATIENT
Start: 2025-04-23

## 2025-04-23 NOTE — TELEPHONE ENCOUNTER
Called pt back after receiving in basket message to relay surgical instructions.    Spoke to patient to inform them of their surgery arrival time (12 pm), BRENDA, [address] [facility] [address] A:  Verbalized understanding of instructions for pre-wash, and reviewed NPO after midnight.   You may drink gatorade and water only up to 2 hours prior to arrival, NOTHING ELSE.  Confirmed call in regards to medication instructions from anesthesia.   Confirmed patient was NOT using a rideshare service for surgery arrival or  transportation.   Pt's sister will bring to/from   Discussed removing any finger nail polish if applicable   Confirmed no new wounds or abrasions on operative extremity.  con      [place] Pre-Op and PACU Visiting Policy  · Each patient will be allowed 2 visitors with 1 visitor at a time. Only 1 swap out allowed.  · Pediatric patients: Both parents are allowed if present.  · Post-Op: If there is more than 1 visitor, the person that is the main caregiver will need to be available for d/c instructions should visit 2nd, and stay with the patient until d/c.  All visitors must be accompanied in and out with an employee.

## 2025-04-23 NOTE — TELEPHONE ENCOUNTER
Spoke to patient to inform them of their surgery arrival time (10 am), BRENDA, [address] [facility] A:  Verbalized understanding of instructions for pre-wash, and reviewed NPO after midnight.   You may drink gatorade and water only up to 2 hours prior to arrival, NOTHING ELSE.  Confirmed call in regards to medication instructions from anesthesia.   Confirmed patient was NOT using a rideshare service for surgery arrival or  transportation.   sister  Discussed removing any finger nail polish if applicable   none  Confirmed no new wounds or abrasions on operative extremity.  none      [place] Pre-Op and PACU Visiting Policy  · Each patient will be allowed 2 visitors with 1 visitor at a time. Only 1 swap out allowed.  · Pediatric patients: Both parents are allowed if present.  · Post-Op: If there is more than 1 visitor, the person that is the main caregiver will need to be available for d/c instructions should visit 2nd, and stay with the patient until d/c.  All visitors must be accompanied in and out with an employee.

## 2025-04-24 ENCOUNTER — HOSPITAL ENCOUNTER (OUTPATIENT)
Facility: HOSPITAL | Age: 60
Discharge: HOME | End: 2025-04-24
Attending: ORTHOPAEDIC SURGERY | Admitting: ORTHOPAEDIC SURGERY
Payer: COMMERCIAL

## 2025-04-24 ENCOUNTER — ANESTHESIA (OUTPATIENT)
Dept: SURGERY | Facility: HOSPITAL | Age: 60
End: 2025-04-24
Payer: COMMERCIAL

## 2025-04-24 VITALS
DIASTOLIC BLOOD PRESSURE: 55 MMHG | WEIGHT: 103 LBS | HEART RATE: 88 BPM | OXYGEN SATURATION: 95 % | RESPIRATION RATE: 18 BRPM | TEMPERATURE: 99 F | SYSTOLIC BLOOD PRESSURE: 88 MMHG | BODY MASS INDEX: 18.95 KG/M2 | HEIGHT: 62 IN

## 2025-04-24 DIAGNOSIS — M72.0 DUPUYTREN'S CONTRACTURE OF RIGHT HAND: Primary | ICD-10-CM

## 2025-04-24 DIAGNOSIS — M72.0 DUPUYTREN'S DISEASE OF PALM OF RIGHT HAND: ICD-10-CM

## 2025-04-24 DIAGNOSIS — M79.641 RIGHT HAND PAIN: ICD-10-CM

## 2025-04-24 PROCEDURE — 63600175 PHARM REV CODE 636 W HCPCS: Performed by: ANESTHESIOLOGY

## 2025-04-24 PROCEDURE — 36000707: Performed by: ORTHOPAEDIC SURGERY

## 2025-04-24 PROCEDURE — 71000015 HC POSTOP RECOV 1ST HR: Performed by: ORTHOPAEDIC SURGERY

## 2025-04-24 PROCEDURE — 36000706: Performed by: ORTHOPAEDIC SURGERY

## 2025-04-24 PROCEDURE — 63600175 PHARM REV CODE 636 W HCPCS: Performed by: PHYSICIAN ASSISTANT

## 2025-04-24 PROCEDURE — 71000033 HC RECOVERY, INTIAL HOUR: Performed by: ORTHOPAEDIC SURGERY

## 2025-04-24 PROCEDURE — 26125 RELEASE PALM CONTRACTURE: CPT | Mod: F6,,, | Performed by: ORTHOPAEDIC SURGERY

## 2025-04-24 PROCEDURE — 26123 RELEASE PALM CONTRACTURE: CPT | Mod: 22,F5,, | Performed by: ORTHOPAEDIC SURGERY

## 2025-04-24 PROCEDURE — 25000003 PHARM REV CODE 250: Performed by: REGISTERED NURSE

## 2025-04-24 PROCEDURE — 94761 N-INVAS EAR/PLS OXIMETRY MLT: CPT

## 2025-04-24 PROCEDURE — 25000003 PHARM REV CODE 250: Performed by: PHYSICIAN ASSISTANT

## 2025-04-24 PROCEDURE — 37000009 HC ANESTHESIA EA ADD 15 MINS: Performed by: ORTHOPAEDIC SURGERY

## 2025-04-24 PROCEDURE — 64415 NJX AA&/STRD BRCH PLXS IMG: CPT | Performed by: ANESTHESIOLOGY

## 2025-04-24 PROCEDURE — 99900035 HC TECH TIME PER 15 MIN (STAT)

## 2025-04-24 PROCEDURE — 88304 TISSUE EXAM BY PATHOLOGIST: CPT | Mod: TC | Performed by: ORTHOPAEDIC SURGERY

## 2025-04-24 PROCEDURE — 37000008 HC ANESTHESIA 1ST 15 MINUTES: Performed by: ORTHOPAEDIC SURGERY

## 2025-04-24 PROCEDURE — C1889 IMPLANT/INSERT DEVICE, NOC: HCPCS | Performed by: ORTHOPAEDIC SURGERY

## 2025-04-24 PROCEDURE — 63600175 PHARM REV CODE 636 W HCPCS: Performed by: REGISTERED NURSE

## 2025-04-24 PROCEDURE — 25000003 PHARM REV CODE 250

## 2025-04-24 PROCEDURE — 25000003 PHARM REV CODE 250: Performed by: ORTHOPAEDIC SURGERY

## 2025-04-24 RX ORDER — LIDOCAINE HYDROCHLORIDE 20 MG/ML
INJECTION INTRAVENOUS
Status: DISCONTINUED | OUTPATIENT
Start: 2025-04-24 | End: 2025-04-24

## 2025-04-24 RX ORDER — MUPIROCIN 20 MG/G
OINTMENT TOPICAL 2 TIMES DAILY
Status: DISCONTINUED | OUTPATIENT
Start: 2025-04-24 | End: 2025-04-24 | Stop reason: HOSPADM

## 2025-04-24 RX ORDER — FENTANYL CITRATE 50 UG/ML
INJECTION, SOLUTION INTRAMUSCULAR; INTRAVENOUS
Status: DISCONTINUED | OUTPATIENT
Start: 2025-04-24 | End: 2025-04-24

## 2025-04-24 RX ORDER — ROPIVACAINE HYDROCHLORIDE 5 MG/ML
INJECTION, SOLUTION EPIDURAL; INFILTRATION; PERINEURAL
Status: COMPLETED | OUTPATIENT
Start: 2025-04-24 | End: 2025-04-24

## 2025-04-24 RX ORDER — BACITRACIN ZINC 500 UNIT/G
OINTMENT (GRAM) TOPICAL
Status: DISCONTINUED | OUTPATIENT
Start: 2025-04-24 | End: 2025-04-24 | Stop reason: HOSPADM

## 2025-04-24 RX ORDER — MUPIROCIN 20 MG/G
OINTMENT TOPICAL
Status: DISCONTINUED | OUTPATIENT
Start: 2025-04-24 | End: 2025-04-24 | Stop reason: HOSPADM

## 2025-04-24 RX ORDER — HYDROCODONE BITARTRATE AND ACETAMINOPHEN 5; 325 MG/1; MG/1
1 TABLET ORAL EVERY 4 HOURS PRN
Status: DISCONTINUED | OUTPATIENT
Start: 2025-04-24 | End: 2025-04-24 | Stop reason: HOSPADM

## 2025-04-24 RX ORDER — DEXMEDETOMIDINE HYDROCHLORIDE 100 UG/ML
INJECTION, SOLUTION INTRAVENOUS
Status: DISCONTINUED | OUTPATIENT
Start: 2025-04-24 | End: 2025-04-24

## 2025-04-24 RX ORDER — ONDANSETRON HYDROCHLORIDE 2 MG/ML
INJECTION, SOLUTION INTRAVENOUS
Status: DISCONTINUED | OUTPATIENT
Start: 2025-04-24 | End: 2025-04-24

## 2025-04-24 RX ORDER — PROPOFOL 10 MG/ML
VIAL (ML) INTRAVENOUS
Status: DISCONTINUED | OUTPATIENT
Start: 2025-04-24 | End: 2025-04-24

## 2025-04-24 RX ORDER — EPHEDRINE SULFATE 50 MG/ML
INJECTION, SOLUTION INTRAVENOUS
Status: DISCONTINUED | OUTPATIENT
Start: 2025-04-24 | End: 2025-04-24

## 2025-04-24 RX ORDER — ACETAMINOPHEN 500 MG
1000 TABLET ORAL
Status: COMPLETED | OUTPATIENT
Start: 2025-04-24 | End: 2025-04-24

## 2025-04-24 RX ORDER — FENTANYL CITRATE 50 UG/ML
100 INJECTION, SOLUTION INTRAMUSCULAR; INTRAVENOUS
Status: DISCONTINUED | OUTPATIENT
Start: 2025-04-24 | End: 2025-04-24 | Stop reason: HOSPADM

## 2025-04-24 RX ORDER — MIDAZOLAM HYDROCHLORIDE 1 MG/ML
1 INJECTION, SOLUTION INTRAMUSCULAR; INTRAVENOUS
Status: DISCONTINUED | OUTPATIENT
Start: 2025-04-24 | End: 2025-04-24 | Stop reason: HOSPADM

## 2025-04-24 RX ORDER — GLUCAGON 1 MG
1 KIT INJECTION
Status: DISCONTINUED | OUTPATIENT
Start: 2025-04-24 | End: 2025-04-24 | Stop reason: HOSPADM

## 2025-04-24 RX ORDER — PHENYLEPHRINE HYDROCHLORIDE 10 MG/ML
INJECTION INTRAVENOUS
Status: DISCONTINUED | OUTPATIENT
Start: 2025-04-24 | End: 2025-04-24

## 2025-04-24 RX ORDER — CELECOXIB 200 MG/1
400 CAPSULE ORAL
Status: COMPLETED | OUTPATIENT
Start: 2025-04-24 | End: 2025-04-24

## 2025-04-24 RX ORDER — PROPOFOL 10 MG/ML
INJECTION, EMULSION INTRAVENOUS CONTINUOUS PRN
Status: DISCONTINUED | OUTPATIENT
Start: 2025-04-24 | End: 2025-04-24

## 2025-04-24 RX ORDER — CEFAZOLIN 2 G/1
2 INJECTION, POWDER, FOR SOLUTION INTRAMUSCULAR; INTRAVENOUS
Status: DISCONTINUED | OUTPATIENT
Start: 2025-04-24 | End: 2025-04-24 | Stop reason: HOSPADM

## 2025-04-24 RX ORDER — DEXAMETHASONE SODIUM PHOSPHATE 4 MG/ML
INJECTION, SOLUTION INTRA-ARTICULAR; INTRALESIONAL; INTRAMUSCULAR; INTRAVENOUS; SOFT TISSUE
Status: DISCONTINUED | OUTPATIENT
Start: 2025-04-24 | End: 2025-04-24

## 2025-04-24 RX ADMIN — EPHEDRINE SULFATE 10 MG: 50 INJECTION INTRAVENOUS at 04:04

## 2025-04-24 RX ADMIN — ONDANSETRON 4 MG: 2 INJECTION INTRAMUSCULAR; INTRAVENOUS at 03:04

## 2025-04-24 RX ADMIN — DEXMEDETOMIDINE 10 MCG: 100 INJECTION, SOLUTION, CONCENTRATE INTRAVENOUS at 02:04

## 2025-04-24 RX ADMIN — PHENYLEPHRINE HYDROCHLORIDE 100 MCG: 10 INJECTION INTRAVENOUS at 02:04

## 2025-04-24 RX ADMIN — CELECOXIB 400 MG: 200 CAPSULE ORAL at 10:04

## 2025-04-24 RX ADMIN — PHENYLEPHRINE HYDROCHLORIDE 100 MCG: 10 INJECTION INTRAVENOUS at 01:04

## 2025-04-24 RX ADMIN — PHENYLEPHRINE HYDROCHLORIDE 100 MCG: 10 INJECTION INTRAVENOUS at 04:04

## 2025-04-24 RX ADMIN — PROPOFOL 50 MG: 10 INJECTION, EMULSION INTRAVENOUS at 03:04

## 2025-04-24 RX ADMIN — DEXMEDETOMIDINE 10 MCG: 100 INJECTION, SOLUTION, CONCENTRATE INTRAVENOUS at 04:04

## 2025-04-24 RX ADMIN — PHENYLEPHRINE HYDROCHLORIDE 100 MCG: 10 INJECTION INTRAVENOUS at 03:04

## 2025-04-24 RX ADMIN — MUPIROCIN: 20 OINTMENT TOPICAL at 10:04

## 2025-04-24 RX ADMIN — FENTANYL CITRATE 25 MCG: 50 INJECTION, SOLUTION INTRAMUSCULAR; INTRAVENOUS at 04:04

## 2025-04-24 RX ADMIN — FENTANYL CITRATE 50 MCG: 50 INJECTION INTRAMUSCULAR; INTRAVENOUS at 11:04

## 2025-04-24 RX ADMIN — PROPOFOL 50 MG: 10 INJECTION, EMULSION INTRAVENOUS at 01:04

## 2025-04-24 RX ADMIN — EPHEDRINE SULFATE 5 MG: 50 INJECTION INTRAVENOUS at 04:04

## 2025-04-24 RX ADMIN — ACETAMINOPHEN 1000 MG: 500 TABLET ORAL at 10:04

## 2025-04-24 RX ADMIN — FENTANYL CITRATE 25 MCG: 50 INJECTION, SOLUTION INTRAMUSCULAR; INTRAVENOUS at 05:04

## 2025-04-24 RX ADMIN — LIDOCAINE HYDROCHLORIDE 50 MG: 20 INJECTION INTRAVENOUS at 01:04

## 2025-04-24 RX ADMIN — Medication 2 G: at 01:04

## 2025-04-24 RX ADMIN — PROPOFOL 50 MG: 10 INJECTION, EMULSION INTRAVENOUS at 02:04

## 2025-04-24 RX ADMIN — PROPOFOL 100 MCG/KG/MIN: 10 INJECTION, EMULSION INTRAVENOUS at 01:04

## 2025-04-24 RX ADMIN — DEXMEDETOMIDINE 10 MCG: 100 INJECTION, SOLUTION, CONCENTRATE INTRAVENOUS at 03:04

## 2025-04-24 RX ADMIN — ROPIVACAINE HYDROCHLORIDE 30 ML: 5 INJECTION EPIDURAL; INFILTRATION; PERINEURAL at 11:04

## 2025-04-24 RX ADMIN — SODIUM CHLORIDE: 0.9 INJECTION, SOLUTION INTRAVENOUS at 01:04

## 2025-04-24 RX ADMIN — MIDAZOLAM HYDROCHLORIDE 2 MG: 1 INJECTION, SOLUTION INTRAMUSCULAR; INTRAVENOUS at 11:04

## 2025-04-24 RX ADMIN — FENTANYL CITRATE 25 MCG: 50 INJECTION, SOLUTION INTRAMUSCULAR; INTRAVENOUS at 03:04

## 2025-04-24 RX ADMIN — SODIUM CHLORIDE: 0.9 INJECTION, SOLUTION INTRAVENOUS at 03:04

## 2025-04-24 RX ADMIN — DEXAMETHASONE SODIUM PHOSPHATE 10 MG: 4 INJECTION, SOLUTION INTRAMUSCULAR; INTRAVENOUS at 03:04

## 2025-04-24 NOTE — ANESTHESIA PROCEDURE NOTES
Right supraclavicular single shot    Patient location during procedure: pre-op   Block not for primary anesthetic.  Reason for block: at surgeon's request and post-op pain management   Post-op Pain Location: Right arm pain   Start time: 4/24/2025 11:00 AM  Timeout: 4/24/2025 11:00 AM   End time: 4/24/2025 11:10 AM    Staffing  Authorizing Provider: Howard Barrett MD  Performing Provider: Howard Barrett MD    Staffing  Performed by: Howard Barrett MD  Authorized by: Howard Barrett MD    Preanesthetic Checklist  Completed: patient identified, IV checked, site marked, risks and benefits discussed, surgical consent, monitors and equipment checked, pre-op evaluation and timeout performed  Peripheral Block  Patient position: supine  Prep: ChloraPrep  Patient monitoring: heart rate, cardiac monitor, continuous pulse ox, continuous capnometry and frequent blood pressure checks  Block type: supraclavicular  Laterality: right  Injection technique: single shot  Needle  Needle type: Stimuplex   Needle gauge: 22 G  Needle length: 2 in  Needle localization: anatomical landmarks and ultrasound guidance   -ultrasound image captured on disc.  Assessment  Injection assessment: negative aspiration, negative parasthesia and local visualized surrounding nerve  Paresthesia pain: none  Heart rate change: no  Slow fractionated injection: yes  Pain Tolerance: comfortable throughout block and no complaints  Medications:    Medications: ropivacaine (NAROPIN) injection 0.5% - Perineural   30 mL - 4/24/2025 11:05:00 AM    Additional Notes  VSS.  [facility] RN monitoring vitals throughout procedure.  Patient tolerated procedure well.     +10ml 0.5% ropivacaine with epi

## 2025-04-24 NOTE — DISCHARGE INSTRUCTIONS
HAND SURGERY - Care of the Hand after Surgery       Post-Op Care  It is important to follow your orthopaedic surgeon's instructions carefully after you return home. You should ask   someone to check on you that evening. The protocols described here are general in nature. Every person and   every surgery is different so the information given here is for guidance only. If you have questions you should   contact us.     Day of Surgery    You were place in a plaster splint today to protect the surgical area during healing. Do not remove the plaster splint until you are seen by Occupational Therapy, Mayelin Sharp PA-C or Dr. Nowak for your first postop visit    The hand and fingers may be numb for quite some time after surgery. This is due to the anesthetic block used at the time of surgery for pain control.   If you have an arm sling you may remove the sling at your convenience once you regain control of your arm from the anesthetic block.     Begin taking liquids and food as soon as you can. You should always eat some solid food, a sandwich or light meal, a little while before taking your pain meds.     Day 3  Things are much the same on the third day after your surgery. Usually you have less pain and feel like doing more.    Showering: It is important to keep the incision absolutely dry while showering or bathing (use two plastic bags over your hand) or a shower bag.   If you feel that the pain medication you were given after surgery is stronger than you really need you can reduce the dose, take it less frequently or switch to ibuprofen or Tylenol. If you received antibiotics they should be taken until the entire prescription is completed.    Day 7  Occupational Therapy will see you between this time. Please let us know if you are not scheduled [phone]    Day 13  We will see you back after your surgery to review with you what was done in surgery and will talk about rehab and answer any questions you may have.        HAND SURGERY  Driving: You can drive if you are comfortable and have regained full finger movements and if you have sufficient power to control the vehicle.   Return to work: Timing of your return to work is variable according to your occupation and specific surgery. We should discuss this at your follow up visit if not already discussed prior.  Elevation: Hand elevation is important to prevent swelling and stiffness of the fingers. One minute of leaving your hand dangling negates four hours of keeping it elevated. Please remember not to walk with your hand hanging down or to sit with your hand resting in your lap. It is fine, however, to lower your hand for light use and you should get back to normal light activities as soon as possible as guided by common sense.     Post-operative exercises (PERFORM CHECKED EXERCISES ONLY)  [x] Bend your fingers  Relax your hand. Start with your fingers straight and close together. Bend the end and middle joints of your fingers. Keep your wrist and knuckles straight. Moving slowly and smoothly, return your hand to the starting position. Repeat with your other hand. If you can, perform multiple repetitions of this exercise on each hand.    [x] Open your hand wide                       Spread your fingers apart as wide as you can and hold that position. Slowly relax your fingers and bring them together. Return to the open-wide position. Repeat with each hand and gradually add to the number of repetitions.    [x] Make a fist  Start with your fingers straight and spread apart. Make a loose, gentle fist and wrap your thumb around the outside of your fingers. Be careful not to squeeze your fingers together too tightly. Moving slowly and smoothly, return to the starting position. Repeat. Perform this exercise on both hands.    [x] Touch your fingertips  Straighten your fingers and thumb. Bend your thumb across your palm, touching the tip of your thumb to the pad of your hand just  "below your pinky finger. If you can't make your thumb touch, just stretch as far as you can. Return your thumb to its starting position, as shown in images 1 through 3.  For the next exercise, form the letter O by touching your thumb to each fingertip, as shown in images 4 through 6. Moving slowly and smoothly, touch your index finger to your thumb, then straighten your fingers. Touch your middle finger to your thumb and straighten. Follow with your ring and pinky fingers.    [x] Walk your fingers  Rest your hand on a flat surface, such as a tabletop, with your palm facing down and your fingers spread slightly apart. Moving one finger at a time, slowly walk your fingers toward your thumb. Start by lifting and moving your index finger toward your thumb. Follow by lifting and moving your middle finger toward your thumb. Proceed with moving your ring finger and then your pinky finger toward your thumb. Don't move your wrist or thumb while doing this exercise. Repeat with your other hand.      HAND SURGERY - Common Concerns and Frequently Asked Questions    When to Call the Doctor  Pain, burning, or numbness of the fingers or the back of the hand not relieved by elevation of the arm  Pale or cold finger; bluish nail beds  Red line or streak going up the arm  Excessive swelling  Fever over 100.3ºF  Pain unrelieved by pain medication    Tips for one armed living  It helps to have...   In the shower   Plastic bags and rubber bands to cover bandages - the bags that newspapers come in are good to cover the hand and wrist. Otherwise small trash can liners will do. Use two at a time.   Bottle sponge (soft sponge on a long stick) - for the armpit of your "good" hand.   Shower brush   A hair brush in the shower will help you to wash your hair.   Cotton ricky cloth bathrobe - to dry your back.    In the bathroom   Toothpaste, shampoo, etc. in flip-top or pump (not screw top) dispensers.   Consider an electric razor.   Flossers " "(dental floss on a "Y" shaped handle).    In the kitchen   Dycem mat (rubber jar opener mat) - to help open jars, but also keep things from sliding around while you are working on them.    Double suction cup pads ("little Octopus") - to hold items while you use or wash them.   Electric can opener with a lid magnet strong enough to hold the can in the air - for one handed use.   In the bedroom   Back scratcher.   Large sleeve shirts and tops.   Put away clothing which buttons, fastens or snaps in the back or which uses drawstrings.    Sports bra or a camisole instead of a bra.   [employer] nylons can be pulled on one handed - most others can't.   A "wash and wear" haircut.     "

## 2025-04-24 NOTE — ANESTHESIA POSTPROCEDURE EVALUATION
Anesthesia Post Evaluation    Patient: Johana Wright    Procedure(s) Performed: Procedure(s) (LRB):  RELEASE, DUPUYTREN CONTRACTURE THUMB, INDEX, LONG AND RING FINGER (Right)  RELEASE, CONTRACTURE WEBSPACE (Right)    Final Anesthesia Type: general      Patient location during evaluation: PACU  Patient participation: Yes- Able to Participate  Level of consciousness: awake and alert  Post-procedure vital signs: reviewed and stable  Pain management: adequate  Airway patency: patent  TRINH mitigation strategies: Multimodal analgesia  PONV status at discharge: No PONV  Anesthetic complications: no      Cardiovascular status: blood pressure returned to baseline and hemodynamically stable  Respiratory status: unassisted  Hydration status: euvolemic  Follow-up not needed.              Vitals Value Taken Time   BP 84/51 04/24/25 17:33   Temp 37.4 °C (99.3 °F) 04/24/25 17:18   Pulse 89 04/24/25 17:33   Resp 12 04/24/25 17:33   SpO2 94 % 04/24/25 17:33   Vitals shown include unfiled device data.      No case tracking events are documented in the log.      Pain/Guera Score: Pain Rating Prior to Med Admin: 0 (4/24/2025 11:15 AM)  Pain Rating Post Med Admin: 0 (4/24/2025  1:15 PM)  Guera Score: 9 (4/24/2025  5:18 PM)

## 2025-04-24 NOTE — PLAN OF CARE
Nursing pre-op complete. Pt calm, will continue to monitor. Call light within reach. Pt's sister at bedside. Pt's belongings placed in locker #2, 1 bag.

## 2025-04-24 NOTE — OP NOTE
[facility])  Surgery Department  Operative Note    SUMMARY     Date of Procedure: 4/24/2025     Procedure:     1.  Right index finger partial Dupuytren's fasciectomy with PIP joint release, cpt 14421  2.  Right ring finger partial Dupuytren's fasciectomy with PIP joint release, cpt 46277  3.  Right long finger partial Dupuytren's fasciectomy with PIP joint release, cpt 69192  4.  Right thumb partial Dupuytren's fasciectomy with PIP joint release, cpt 02535  5.  Right webspace contracture release  6.  Skin graft substitute application right palm  22 modifier      Surgeons and Role:     * Aysha Nowak MD - Primary    Assisting Surgeon: None    Pre-Operative Diagnosis: Dupuytren's disease of palm of right hand [M72.0]  Dupuytren's contracture of right hand [M72.0]  Contracted scar right hand, 709.2  Right hand finger contracture    Post-Operative Diagnosis: Post-Op Diagnosis Codes:     * Dupuytren's disease of palm of right hand [M72.0]     * Dupuytren's contracture of right hand [M72.0]  Contracted scar right hand, 709.2  Right hand finger contracture    Anesthesia: Regional    Indication for Procedure:  59 yo female with longstanding right thumb, index, long and ring fingers Dupuytren's contracture with previous surgical release with return of contracture and deformity.  Risks and benefits of the procedure were discussed with the patient and informed consent was obtained.    Description of the Findings of the Procedure: The patient was seen in the preoperative holding area and the right hand was marked.  A regional block of the arm was performed in the preoperative holding area.  The patient was taken to the OR, placed supine on the table, and a padded hand table was used. After monitored anesthesia care was admitted without difficulty, a time-out procedure was performed identifying the patient, the operative site and the procedure to be performed.  A tourniquet was placed on the arm and the  upper extremity was prepped and draped in standard sterile fashion. The upper extremity was elevated and exsanguinated with an Esmarch bandage, and the tourniquet was inflated to 250 mm Hg.      An incision was made through the previous Brunner incision of the volar right long finger from the distal palmar crease past the DIP joint of the finger.  Dissection with Littler scissors was used to expose the Dupuytren's cord.  There was a spiral band radially that was displacing the neurovascular bundle centrally as well as copious scar tissue.  This was truncated and allowed to the long finger to extend partially at the PIP joint and MP joint.  Careful dissection under loupe magnification was used to identify the neurovascular bundles to the finger as well as the flexor tendon sheath.  These were protected throughout the case.  The diseased Dupuytren's fascia was sent for permanent pathological specimen.  There were retrovascular cords as well which were excised and allowed PIP joint extension.  There was a natatory cord tethering the right index finger.  This was excised through the same wound from the palm to the index finger and allowed full extension of the index finger.  The wounds were then copiously irrigated.  Local rotational flaps were used to close the skin.  There was a 2 x 2 cm area over the A1 pulley which would required skin graft substitute to close.  Kerecis skin graft substitute was cut to the area of the wound and inset with 3-0 Prolene.  3-0 Prolene was used to close the Z-plasty flaps in a horizontal mattress fashion.    In a similar manner the right ring finger was approached.  A volar Dimas incisions were made from the palm to the DIP joint in order to expose the Dupuytren's cords which were tethering the MP and PIP joints.  Under careful dissection the Dupuytren's cords were sharply excised, protecting the neurovascular bundles.  There was a spiral cord on the radial side displacing the  neurovascular bundle, this was sharply excised and sent for specimen.  A volar Dimas incision was made over the thumb webspace.  There was a Dupuytren's cord tethering the 1st and 2nd metacarpal as well as the thumb proximal phalanx.  A volar Dimas incision was made over the cord, the cord was sharply excised in its entirety allowing the thumb webspace to fully expand.     3-0 Prolene was used to close the skin in a horizontal mattress fashion.  There was full extension of the right index, ring, long fingers and thumb MP and PIP joints.  The tourniquet was deflated and hemostasis was achieved with bipolar electrocautery, the hand and fingers were well perfused.  All instrument, sponge and needle counts were correct.  Adaptic, 4x4, cast padding, an volar splint and coban were used to dress the hand. The patient tolerated the procedure well.  She was taken awake, alert and in good condition to the recovery room.    22 modifier was used to additional time required to release Dupuytren's fascia through previous scar in the palm, long and ring fingers.    Complications: No    Estimated Blood Loss (EBL): minimal           Post op plans patient keep dressing clean dry intact. Will see the patient back in 10-14 days to remove dressing and sutures. Light use of the hand for 6 weeks.    Complications: No    Estimated Blood Loss (EBL): minimal           Implants:   Implant Name Type Inv. Item Serial No.  Lot No. LRB No. Used Action   KERECIS OMEGA 3 MARIGEN 1.75 X 1.75     [id number] Right 1 Implanted       Specimens:   Specimen (24h ago, onward)       Start     Ordered    04/24/25 6669  Specimen to Pathology  RELEASE UPON ORDERING        References:    Click here for ordering Quick Tip   Question:  Release to patient  Answer:  Immediate    04/24/25 3100                            Condition: Good    Disposition: PACU - hemodynamically stable.    Attestation: I was present and scrubbed for the entire procedure.

## 2025-04-24 NOTE — PLAN OF CARE
Patient is AAO and VSS.  Tolerating PO and states pain is tolerable.  Dressing CDI.  Patient states they are ready for d/c.  IV removed.  Catheter tip intact.  Sister at bedside.  Discharge instructions reviewed and copy given to the patient and sister.  Questions answered.  Both verbalized understanding.  Medication delivered to bedside. Patient wheeled to car by Suzy GUERRERO

## 2025-04-24 NOTE — TRANSFER OF CARE
"Anesthesia Transfer of Care Note    Patient: Johana Wright    Procedure(s) Performed: Procedure(s) (LRB):  RELEASE, DUPUYTREN CONTRACTURE THUMB, INDEX, LONG AND RING FINGER (Right)  RELEASE, CONTRACTURE WEBSPACE (Right)    Patient location: PACU    Anesthesia Type: general    Transport from OR: Transported from OR on room air with adequate spontaneous ventilation    Post pain: adequate analgesia    Post assessment: no apparent anesthetic complications and tolerated procedure well    Post vital signs: stable    Level of consciousness: awake, alert and oriented    Nausea/Vomiting: no nausea/vomiting    Complications: none    Transfer of care protocol was followedComments: Nurse at bedside, VSS, spont reg resp noted      Last vitals: Visit Vitals  BP (!) 93/45   Pulse 91   Temp 36.5 °C (97.7 °F) (Tympanic)   Resp 18   Ht 5' 2" (1.575 m)   Wt 46.7 kg (103 lb)   SpO2 97%   Breastfeeding No   BMI 18.84 kg/m²     "

## 2025-04-24 NOTE — DISCHARGE SUMMARY
[facility] - [facility] (Hospital)  Discharge Note  Short Stay    Procedure(s) (LRB):  RELEASE, DUPUYTREN CONTRACTURE THUMB, INDEX, LONG AND RING FINGER (Right)  RELEASE, CONTRACTURE WEBSPACE (Right)      OUTCOME: Patient tolerated treatment/procedure well without complication and is now ready for discharge.    DISPOSITION: Home or Self Care    FINAL DIAGNOSIS:  <principal problem not specified>    FOLLOWUP: In clinic    DISCHARGE INSTRUCTIONS:    Discharge Procedure Orders   Diet general     Keep surgical extremity elevated     Ice to affected area     Lifting restrictions     No driving, operating heavy equipment or signing legal documents while taking pain medication.     Leave dressing on - Keep it clean, dry, and intact until clinic visit     Call MD for:  temperature >100.4     Call MD for:  persistent nausea and vomiting     Call MD for:  severe uncontrolled pain     Call MD for:  difficulty breathing, headache or visual disturbances     Call MD for:  redness, tenderness, or signs of infection (pain, swelling, redness, odor or green/yellow discharge around incision site)     Call MD for:  hives     Call MD for:  persistent dizziness or light-headedness     Call MD for:  extreme fatigue

## 2025-04-24 NOTE — ANESTHESIA PREPROCEDURE EVALUATION
04/24/2025  Johana Wright is a 60 y.o., female.      Pre-op Assessment    I have reviewed the Patient Summary Reports.     I have reviewed the Nursing Notes. I have reviewed the NPO Status.   I have reviewed the Medications.     Review of Systems  Anesthesia Hx:  No problems with previous Anesthesia   History of prior surgery of interest to airway management or planning:  Previous anesthesia: General        Denies Family Hx of Anesthesia complications.    Denies Personal Hx of Anesthesia complications.                    Social:  Non-Smoker       Hematology/Oncology:  Hematology Normal   Oncology Normal                                   EENT/Dental:  EENT/Dental Normal           Cardiovascular:  Exercise tolerance: good              hyperlipidemia                               Pulmonary:  Pulmonary Normal                       Renal/:  Renal/ Normal                 Hepatic/GI:  Hepatic/GI Normal                    Musculoskeletal:  Musculoskeletal Normal                Neurological:      Headaches     Migraines                            Endocrine:  Endocrine Normal            Dermatological:  Skin Normal    Psych:   anxiety                 Physical Exam  General: Well nourished, Cooperative, Alert and Oriented    Airway:  Mallampati: II / II  Mouth Opening: Normal  TM Distance: Normal  Tongue: Normal  Neck ROM: Normal ROM    Dental:  Intact, Caps / Implants    Chest/Lungs:  Clear to auscultation, Normal Respiratory Rate    Heart:  Rate: Normal  Rhythm: Regular Rhythm  Sounds: Normal    Abdomen:  Normal, Soft, Nontender        Anesthesia Plan  Type of Anesthesia, risks & benefits discussed:    Anesthesia Type: Gen Supraglottic Airway, Gen ETT, MAC, Regional  Intra-op Monitoring Plan: Standard ASA Monitors  Post Op Pain Control Plan: multimodal analgesia and peripheral nerve block  Induction:   IV  Airway Plan: Direct, Post-Induction  Informed Consent: Informed consent signed with the Patient and all parties understand the risks and agree with anesthesia plan.  All questions answered.   ASA Score: 2    Ready For Surgery From Anesthesia Perspective.     .

## 2025-04-24 NOTE — H&P
Aysha Nowak MD at 2/12/2025 10:00 AM    Status: Signed   Expand All Collapse All  Johana Wright presents for follow up evaluation of        Encounter Diagnoses   Name Primary?    Dupuytren's contracture of left hand      Dupuytren's disease of palm of right hand Yes    Dupuytren's contracture of right hand      Chronic pain of right hand        History of Present Illness    CHIEF COMPLAINT:  - Dupuytren's contracture affecting multiple fingers of the right hand, with worsening symptoms since previous surgery.     HPI:  Patient presents for evaluation of Dupuytren's contracture affecting multiple fingers on her right hand. She reports a recent fall in a grocery store which delayed her previous appointment. She expresses concern that her right hand condition is worsening, particularly noting it's her primary hand.     She describes difficulty with , stating she has to be careful due to poor  when holding stemless drinks. She denies dropping things but notes functional limitations. The index finger on the right hand is starting to contract, with a palpable cord and knot. The ring finger is also affected, with a thick cord noticeable. The pinky finger shows some involvement but currently maintains good motion. She mentions occasional inflammation in the pad of the pinky finger and a thick cord tethering her thumb, which may be closing down her web space.     She reports no significant pain but expresses concern about the progressive nature of the condition, stating she noticed it more after her left hand improved. She denies any numbness but mentions occasional Raynaud's-like symptoms. Her last surgery on her left hand resulted in significant improvement, which she describes as remarkable.     Patient denies dropping things, significant pain, or having any numbness in the affected hand.     SURGICAL HISTORY:  - Left hand surgery for Dupuytren's contracture: 6 weeks ago  - Right hand surgery for  Dupuytren's contracture: date not specified     WORK STATUS:  - Working at a [employer] during [employer]  - Planning a personal trip to [facility] in September or October         Vitals       Vitals:     02/12/25 1019   PainSc:   1   PainLoc: Hand            PE:     AA&O x 4.  NAD  HEENT:  NCAT, sclera nonicteric  Lungs:  Respirations are equal and unlabored.  CV:  2+ bilateral upper and lower extremity pulses.  MSK:   Dupuytren's cords and contracture of right thumb webspace, right index long and ring PIP joints 45°, Near full extension of the left small ring and long fingers, stiffness with flexion of the fingers, especially small and ring finger PIP joints. Neurovascularly intact bilaterally.  5/5 thenar and intrinsic musculature strength.                Physical Exam               Diagnostic studies and other clinical records review:  X-rays AP, lateral and oblique bilateral hand taken today are independently reviewed by me and shows bilateral hand finger contractures.      Assessment/Plan:        Encounter Diagnoses   Name Primary?    Dupuytren's contracture of left hand      Dupuytren's disease of palm of right hand Yes    Dupuytren's contracture of right hand      Chronic pain of right hand        Plan: The patient and I had a thorough discussion today. We discussed the working diagnosis as well as several other potential alternative diagnoses. Treatment options were discussed, both conservative and surgical. Conservative treatment options would include things such as activity modifications, workplace modifications, a period of rest, oral vs topical OTC and prescription anti-inflammatory medications, occupational therapy, splinting/bracing, immobilization, corticosteroid injections, and others. Surgical options were discussed as well.      At this time, the patient has exhausted conservative measures and would like to proceed with surgery. Surgery would include right thumb, index, long and ring finger dupuytrens  removal, partial fasciectomy and contracture release. Consent signed today in clinic. Light use of the hand will be indicated for the first 4-6 weeks     We have discussed risks, benefits and alternatives of hand surgery which include but are not limited to blood clots in the legs that can travel to the lungs (pulmonary embolism). Pulmonary embolism can cause shortness of breath, chest pain, and even shock. Other risks include urinary tract infection, nausea and vomiting (usually related to pain medication), chronic pain, bleeding, nerve damage, blood vessel injury, scarring and infection of the hand which can require re-operation. Furthermore, the risks of anesthesia include potential heart, lung, kidney, and liver damage.  Informed consent was obtained. she understands and would like to proceed with surgery in the near future.               Aysha Nowak MD     Please be aware that this note has been generated with the assistance of ezzai - how to [place] voice-to-text.  Please excuse any spelling or grammatical errors.  This note was generated with the assistance of ambient listening technology. Verbal consent was obtained by the patient and accompanying visitor(s) for the recording of patient appointment to facilitate this note. I attest to having reviewed and edited the generated note for accuracy, though some syntax or spelling errors may persist. Please contact the author of this note for any clarification.

## 2025-04-24 NOTE — BRIEF OP NOTE
[facility] - Surgery ([facility])  Surgery Department  Operative Note    SUMMARY     Date of Procedure: 4/24/2025     Procedure: Procedure(s) (LRB):  RELEASE, DUPUYTREN CONTRACTURE THUMB, INDEX, LONG AND RING FINGER (Right)  RELEASE, CONTRACTURE WEBSPACE (Right)   Application skin graft substitute    Surgeons and Role:     * Aysha Nowak MD - Primary    Assisting Surgeon: None    Pre-Operative Diagnosis: Dupuytren's disease of palm of right hand [M72.0]  Dupuytren's contracture of right hand [M72.0]    Post-Operative Diagnosis: Post-Op Diagnosis Codes:     * Dupuytren's disease of palm of right hand [M72.0]     * Dupuytren's contracture of right hand [M72.0]    Anesthesia: Regional    Estimated Blood Loss (EBL): minimal           Implants:   Implant Name Type Inv. Item Serial No.  Lot No. LRB No. Used Action   [employer] O[employer] 3 MARIGEN 1.75 X 1.75     [id number] Right 1 Implanted       Specimens:   Specimen (24h ago, onward)       Start     Ordered    04/24/25 1638  Specimen to Pathology  RELEASE UPON ORDERING        References:    Click here for ordering Quick Tip   Question:  Release to patient  Answer:  Immediate    04/24/25 1638                   ID Type Source Tests Collected by Time Destination   1 : DUPUYTRENS FASCIA RIGHT LONG AND RING FINGER Tissue Finger, Right Hand SPECIMEN TO PATHOLOGY Aysha Nowak MD 4/24/2025 5310               Condition: Good    Disposition: PACU - hemodynamically stable.    Attestation: Op Note Attestation: I was physically present and scrubbed for the entire procedure.

## 2025-04-29 LAB
ESTROGEN SERPL-MCNC: NORMAL PG/ML
INSULIN SERPL-ACNC: NORMAL U[IU]/ML
LAB AP CLINICAL INFORMATION: NORMAL
LAB AP GROSS DESCRIPTION: NORMAL
LAB AP PERFORMING LOCATION(S): NORMAL
LAB AP REPORT FOOTNOTES: NORMAL

## 2025-04-30 ENCOUNTER — CLINICAL SUPPORT (OUTPATIENT)
Dept: REHABILITATION | Facility: HOSPITAL | Age: 60
End: 2025-04-30
Attending: ORTHOPAEDIC SURGERY
Payer: COMMERCIAL

## 2025-04-30 DIAGNOSIS — M25.642 STIFFNESS OF LEFT HAND JOINT: ICD-10-CM

## 2025-04-30 DIAGNOSIS — M79.642 HAND PAIN, LEFT: Primary | ICD-10-CM

## 2025-04-30 DIAGNOSIS — R29.898 DECREASED GRIP STRENGTH OF LEFT HAND: ICD-10-CM

## 2025-04-30 DIAGNOSIS — M25.60 RANGE OF MOTION DEFICIT: ICD-10-CM

## 2025-04-30 PROCEDURE — L3913 HFO W/O JOINTS CF: HCPCS

## 2025-04-30 PROCEDURE — 97760 ORTHOTIC MGMT&TRAING 1ST ENC: CPT

## 2025-05-01 NOTE — PROGRESS NOTES
Outpatient Rehab    Occupational Therapy Visit - Orthosis only     Patient Name: Johana Wright  MRN: [id number]  YOB: 1965  Encounter Date: 4/30/2025    Therapy Diagnosis:   Encounter Diagnoses   Name Primary?    Hand pain, left Yes    Stiffness of left hand joint     Range of motion deficit     Decreased  strength of left hand      Physician: Aysha Nowak MD    Physician Orders: Eval and Treat  Medical Diagnosis: Dupuytren's disease of palm of right hand  Dupuytren's contracture of right hand    Visit # / Visits Authorized: 1 / 1  Insurance Authorization Period: 2/12/2025 to 2/12/2026       Time In: 1000   Time Out: 1115  Total Time: 75   Total Billable Time: 50    FOTO:  Intake Score:  %  Survey Score 1:  %  Survey Score 2:  %         Subjective   Pt presents today for orthosis fabrication post op day 6..  Pain reported as 5/10.      Objective   Orthosis Details  In this visit, actions taken with the first orthosis variety included Fabrication, Fitting check, and Orthosis education. Number issued of this variety was 1.            Fabrication Status: Custom  Orthosis Type: Static  Orthosis Design: Forearm-based              Orthosis Position: Full hand extension with 1st web spacing.     Orthosis Education  Orthosis education was provided to Patient. The education recipient's understanding level was Demonstrates understanding and Verbalizes understanding. Provided instruction to wear the orthosis Remove for exercise and At night. MD recommends AROM and PROM throughout the day to limit adhesions and promote motion. Also provided education regarding Clinic contact information, Don/doff, Home exercise program, Edema management, Orthosis care, Hygiene with orthosis, Orthosis purpose, Precautions, Skin checks, Scar management, and Wound care.               Treatment:       Time Entry(in minutes):  Orthotic Management Training Time Entry: 50    Assessment & Plan   Assessment: Loose bandaging  applied at this time for encouraged active and passive ROM. She demo's significant tenderness and caution with attention to wound. Adaptic carefully placed at this time to limit pain with removal with next visit. Pt to return for initial eval.  Evaluation/Treatment Tolerance: Patient tolerated treatment well    Patient will continue to benefit from skilled outpatient occupational therapy to address the deficits listed in the problem list box on initial evaluation, provide pt/family education and to maximize pt's level of independence in the home and community environment.     Patient's spiritual, cultural, and educational needs considered and patient agreeable to plan of care and goals.           Plan: Pt to return for initial eval this week.    Goals:   Active       Orthosis        Pt to independently don/doff orthosis for night wear.        Start:  05/01/25    Expected End:  07/24/25            Pt to perform active and passive ROM of digit flexion as HEP       Start:  05/01/25    Expected End:  07/24/25                Swati Riley OT

## 2025-05-05 ENCOUNTER — CLINICAL SUPPORT (OUTPATIENT)
Dept: REHABILITATION | Facility: HOSPITAL | Age: 60
End: 2025-05-05
Attending: ORTHOPAEDIC SURGERY
Payer: COMMERCIAL

## 2025-05-05 DIAGNOSIS — M25.60 RANGE OF MOTION DEFICIT: Primary | ICD-10-CM

## 2025-05-05 DIAGNOSIS — M79.641 RIGHT HAND PAIN: ICD-10-CM

## 2025-05-05 DIAGNOSIS — M25.641 STIFFNESS OF RIGHT HAND JOINT: ICD-10-CM

## 2025-05-05 PROCEDURE — 97165 OT EVAL LOW COMPLEX 30 MIN: CPT

## 2025-05-05 PROCEDURE — 97110 THERAPEUTIC EXERCISES: CPT

## 2025-05-07 ENCOUNTER — CLINICAL SUPPORT (OUTPATIENT)
Dept: REHABILITATION | Facility: HOSPITAL | Age: 60
End: 2025-05-07
Attending: ORTHOPAEDIC SURGERY
Payer: COMMERCIAL

## 2025-05-07 ENCOUNTER — OFFICE VISIT (OUTPATIENT)
Dept: ORTHOPEDICS | Facility: CLINIC | Age: 60
End: 2025-05-07
Payer: COMMERCIAL

## 2025-05-07 DIAGNOSIS — M79.641 RIGHT HAND PAIN: Primary | ICD-10-CM

## 2025-05-07 DIAGNOSIS — Z98.890 POST-OPERATIVE STATE: Primary | ICD-10-CM

## 2025-05-07 DIAGNOSIS — M25.641 STIFFNESS OF RIGHT HAND JOINT: ICD-10-CM

## 2025-05-07 DIAGNOSIS — M72.0 DUPUYTREN'S DISEASE OF PALM OF RIGHT HAND: ICD-10-CM

## 2025-05-07 DIAGNOSIS — M72.0 DUPUYTREN'S CONTRACTURE OF RIGHT HAND: ICD-10-CM

## 2025-05-07 PROCEDURE — 97110 THERAPEUTIC EXERCISES: CPT

## 2025-05-07 PROCEDURE — 99999 PR PBB SHADOW E&M-EST. PATIENT-LVL III: CPT | Mod: PBBFAC,,,

## 2025-05-07 NOTE — PROGRESS NOTES
[facility]  Hand and Upper Extremity  Post Op Visit  SUBJECTIVE:  Johana Wright presents for post-operative evaluation of   Encounter Diagnoses   Name Primary?    Post-operative state Yes    Dupuytren's disease of palm of right hand     Dupuytren's contracture of right hand      History of Present Illness:  The patient is now 13 days status post right thumb, index finger, long finger, and ring finger partial Dupuytren's fasciectomy with PIP joint release, right webspace contracture release, and skin graft substitute application of right palm by Dr. Nowak on 4/24/2025. Overall the patient reports doing well post-operatively. The patient reports appropriate post-operative soreness with well controlled overall pain.  She reports compliance with the custom orthosis, wearing in the evenings and at night.  She is doing well in therapy, working on range of motion.  She states the most distal suture on the thumb was drying coming undone so she removed it herself.  Patient denies redness, warmth, or drainage from surgical site. She denies fever, chills, or sweats since the surgery.     Vitals:    05/07/25 0922   PainSc:   4   PainLoc: Hand      OBJECTIVE:  Physical Exam:    Vital signs are stable, patient is afebrile.  AA&O x 4.  NAD.  HEENT: NCAT, sclera nonicteric  Lungs: Respirations are equal and unlabored.  CV: 2+ bilateral upper and lower extremity pulses.  MSK: The wounds are healing well with no erythema, warmth, or drainage. Incisions clean, dry, and intact. There are no clinical signs or symptoms of infection present. All sutures removed today without difficulty. Neurovascularly intact. Unable to make composite fist, appropriate finger stiffness.     Diagnostics Review:  Intra-Op Pathology:  Final Diagnosis   SOFT TISSUE, RIGHT HAND, EXCISION:  Benign fibrosis and chronic inflammation     te a custom splint in the following positions:  A hand-based, full extension pan splint.  ()  If there is a  PIP flexion contracture of 30 deg or more, fabricate a second splint to alternate in: An intrinsic plus splint with MPS in flexion to encourage full IP extension. (Fran Rosa Splint) ()  Pt to attend therapy 3 times per week for ROM, wound care, and scar management.    Assessment & Plan: Status post above, doing well.   Post-op instructions reviewed and provided to patient including wound care, scar massage, lifting restrictions, and HEP range of motion exercises  Wear custom orthosis in evenings.   Continue occupational therapy.   Continue with range of motion exercises; no strengthening until 6 weeks post-op.   Light use of the right hand for 6 weeks.   All sutures removed today in clinic. Covered with bacitracin and band-aid. Discussed wound care and signs of infection.   Educated patient on scar massage, begin once incision fully healed.   F/U 4 weeks or sooner for any post-op problems  Call with any questions/concerns in the interim    Mayelin Sharp PA-C  Hand and Upper Extremity

## 2025-05-07 NOTE — PROGRESS NOTES
Outpatient Rehab    Occupational Therapy Evaluation    Patient Name: Johana Wright  MRN: [id number]  YOB: 1965  Encounter Date: 5/5/2025    Therapy Diagnosis:   Encounter Diagnoses   Name Primary?    Range of motion deficit Yes    Right hand pain     Stiffness of right hand joint      Physician: Aysha Nowak MD    Physician Orders: Eval and Treat  Medical Diagnosis: Dupuytren's disease of palm of right hand  Dupuytren's contracture of right hand    Visit # / Visits Authorized: 2 / 20  Insurance Authorization Period: 4/23/2025 to 12/31/2025  Date of Evaluation: 5/5/2025  Plan of Care Certification: 5/5/2025 to 7/30/2025     Time In: 1415   Time Out: 1500  Total Time (in minutes): 45   Total Billable Time (in minutes): 45    Intake Outcome Measure for FOTO Survey    Therapist reviewed FOTO scores for Johana Wright on 5/5/2025.   FOTO report - see Media section or FOTO account episode details.     Intake Score:  %         Subjective   History of Present Illness  Johana is a 60 y.o. female who reports to occupational therapy with a chief concern of R hand pain and stiffness.     The patient reports a medical diagnosis of M72.0 (ICD-10-CM) - Dupuytren's disease of palm of right hand  M72.0 (ICD-10-CM) - Dupuytren's contracture of right hand.   Surgery occurred on 04/24/25.         Dominant Hand: Right  History of Present Condition/Illness: Pt had a R hand dupuytren's release 4/24/2025    Activities of Daily Living  Previously independent with activities of daily living? Yes     Currently independent with activities of daily living? Yes          Previously independent with instrumental activities of daily living? Yes     Currently independent with instrumental activities of daily living? Yes              Employment  Patient reports: Does the patient's condition impact their ability to work?  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       Past Medical History/Physical Systems Review:   Johana Wright  has a past medical history of Abnormal Pap smear of cervix and Breast disorder.    Johana Wright  has a past surgical history that includes Tubal ligation; Breast surgery (07/2023); Sinus surgery; Laser ablation of the cervix (age 29); Hand surgery; TONSILLECTOMY, ADENOIDECTOMY; Rectal prolapse repair (04/2019); Cold knife conization of cervix (N/A, 07/09/2020); Cervical biopsy w/ loop electrode excision; Breast surgery (Bilateral, 01/2021); Dupuytren contracture release (Left, 5/16/2024); Release of contracture of joint (Left, 5/16/2024); lysis, adhesions, shoulder, arthroscopic (Left, 12/20/2024); Arthroscopic debridement of shoulder (Left, 12/20/2024); Manipulation of shoulder joint (12/20/2024); Dupuytren contracture release (Right, 4/24/2025); and Release of contracture (Right, 4/24/2025).    Johana has a current medication list which includes the following prescription(s): butalbital-acetaminophen-caffeine -40 mg, docusate sodium, ergocalciferol, hydrocodone-acetaminophen, hydrocodone-acetaminophen, ibuprofen, immun glob g(igg)/pro/iga 0-50, meloxicam, ondansetron, spironolactone, tretinoin, and venlafaxine.    Review of patient's allergies indicates:   Allergen Reactions    Adhesive      Other reaction(s): rash, skin peels / can use paper tape        Objective       Digit 1 - Thumb Active Range of Motion  Right Thumb   Flexion (deg) Extension (deg) Pain   CMC         MCP 65       IP 50         Right Thumb   Range (deg) Pain   Palmar ABduction 40     Radial ABduction 45     ADduction           Digit 2 - Index Finger Active Range of Motion  Right Index Finger   Extension (deg) Flexion (deg) Pain   MCP   100     PIP   105 Yes   DIP   60     MARTIN:        Digit 3 - Middle Finger Active Range of Motion  Right Middle Finger   Extension (deg) Flexion (deg) Pain   MCP   90     PIP -10 71 Yes   DIP   53     MARTIN:         Digit 4 - Ring Finger Active Range of Motion  Right Ring Finger   Extension (deg) Flexion (deg) Pain   MCP   95     PIP -12 82 Yes   DIP   54     MARTIN:        Digit 5 - Little Finger Active Range of Motion  Right Little Finger   Extension (deg) Flexion (deg) Pain   MCP   98     PIP -10 91 Yes   DIP   62     MARTIN:                          Treatment:       Time Entry(in minutes):  OT Evaluation (Low) Time Entry: 30  Therapeutic Exercise Time Entry: 15    Assessment & Plan   Assessment  Johana presents with a condition of Low complexity.   Presentation of Symptoms: Stable       ADL Limitations : Bathing/showering, Personal hygiene and grooming, Dressing, Toileting and toilet hygiene  Rest and Sleep Limitations: Rest, Disrupted sleep pattern  Work Limitations: Job performance  Leisure Limitations: Leisure participation  Functional Limitations: Activity tolerance, Carrying objects, Completing work/school activities, Fine motor coordination, Functional mobility, Manipulating objects, Pain with ADLs/IADLs, Range of motion, Proprioception, Other (Comment)                 Evaluation/Treatment Response: Patient limited by pain  Prognosis: Good  Assessment Details: Pt presents with pain and stiffness of R hand following multiple dupuytren's cord release. Orthosis was previously fabricated for all digit extension. Pt will benefit from consistent therapy for progressive ROM and strength when appropriate.     Plan  From an occupational therapy perspective, the patient would benefit from: Skilled Rehab Services    Planned therapy interventions include: Therapeutic exercise, Therapeutic activities, Neuromuscular re-education, Manual therapy, ADLs/IADLs, Orthotic management and training, and Other (Comment).    Planned modalities to include: Biofeedback, Cryotherapy (cold pack), Electrical stimulation - passive/unattended, Fluidotherapy, Electrical stimulation - attended, Paraffin bath, Thermotherapy (hot pack), Ultrasound, and  Other (Comment).        Visit Frequency: 3 times Per Week for 12 Weeks.       This plan was discussed with Patient.   Discussion participants: Agreed Upon Plan of Care             Patient's spiritual, cultural, and educational needs considered and patient agreeable to plan of care and goals.           Goals:   Active       LTGs       1) Decrease pain in R hand to no more than 2/10 worst in ADL/IADL's        Start:  05/07/25    Expected End:  07/30/25            2) Increase AROM of composite fist to WFL ( individual digit MARTIN of 270*) for increased functioning in ADL/IADL's        Start:  05/07/25    Expected End:  07/30/25            3) Decrease edema in R hand to trace or none        Start:  05/07/25    Expected End:  07/30/25               Orthosis        Pt to independently don/doff orthosis for night wear.        Start:  05/01/25    Expected End:  07/24/25            Pt to perform active and passive ROM of digit flexion as HEP       Start:  05/01/25    Expected End:  07/24/25               STGs       1) Patient will be independent in HEP        Start:  05/07/25    Expected End:  06/18/25            2) Decrease pain in R hand to no more than 4/10 worst in ADL/IADL's        Start:  05/07/25    Expected End:  06/18/25            3) Increase AROM in R PIP joints at 90* for improved functioning in ADL/IADL's        Start:  05/07/25    Expected End:  06/18/25            4)Assess  and pinch when appropriate        Start:  05/07/25    Expected End:  06/18/25                Swati Riley, OT

## 2025-05-07 NOTE — PROGRESS NOTES
"  Outpatient Rehab    Occupational Therapy Visit    Patient Name: Johana Wright  MRN: [id number]  YOB: 1965  Encounter Date: 5/7/2025    Therapy Diagnosis:   Encounter Diagnoses   Name Primary?    Right hand pain Yes    Stiffness of right hand joint      Physician: Aysha Nowak MD    Physician Orders: Eval and Treat  Medical Diagnosis: Dupuytren's disease of palm of right hand  Dupuytren's contracture of right hand    Visit # / Visits Authorized: 2 / 20  Insurance Authorization Period: 4/23/2025 to 12/31/2025  Date of Evaluation: 5/5/2025  Plan of Care Certification: 5/5/2025 to 7/30/2025     Time In: 1035   Time Out: 1120  Total Time (in minutes): 45   Total Billable Time (in minutes):  45 minutes     FOTO:  Intake Score:  %  Survey Score 2:  %  Survey Score 3:  %         Subjective   "Everything is feeling pretty good. My hand is moving better than the other hand already." Patient had sutures removed prior to session today. Notes a little soreness following suture removal..         Objective            Treatment:  Therapeutic Exercise  TE 1: Therapist A gentle PROM  TE 2: TGEs, joint blocking, finger lifts, spreads  TE 3: radial finger walks RLF  TE 4: p/h full fists  Modalities  Moist Heat (min): Patient tolerates MHP x 10 min in order to decrease pain and increase soft tissue extensibility in preparation for ROM, concurrent with assessment of deficits.    Time Entry(in minutes):  Hot/Cold Pack Time Entry: 10  Therapeutic Exercise Time Entry: 35    Assessment & Plan   Assessment: Patient arrives to tx session following suture removal with MD. She reports a little soreness following the suture removal. She states that she has been performin passive exercises at home as well as compliance with night time orthosis wear. Today, we continued established ax to optimize ROM gains. We also initiated joint blocking for IP ROM as well as p/h full fists. She is already making very good progress. " Patient notes some hypersensitivity at the incisions, especially the skin graft area which we rewrapped with adaptic and gauze. Patient would benefit from ongoing skilled OT services.  Evaluation/Treatment Tolerance: Patient tolerated treatment well    Patient will continue to benefit from skilled outpatient occupational therapy to address the deficits listed in the problem list box on initial evaluation, provide pt/family education and to maximize pt's level of independence in the home and community environment.     Patient's spiritual, cultural, and educational needs considered and patient agreeable to plan of care and goals.           Plan: Patient would benefit from skilled OT services. Continue POC as tolerated.    Goals:   Active       LTGs       1) Decrease pain in R hand to no more than 2/10 worst in ADL/IADL's  (Progressing)       Start:  05/07/25    Expected End:  07/30/25            2) Increase AROM of composite fist to WFL ( individual digit MARTIN of 270*) for increased functioning in ADL/IADL's  (Progressing)       Start:  05/07/25    Expected End:  07/30/25            3) Decrease edema in R hand to trace or none  (Progressing)       Start:  05/07/25    Expected End:  07/30/25               Orthosis        Pt to independently don/doff orthosis for night wear.  (Met)       Start:  05/01/25    Expected End:  07/24/25    Resolved:  05/07/25         Pt to perform active and passive ROM of digit flexion as HEP (Progressing)       Start:  05/01/25    Expected End:  07/24/25               STGs       1) Patient will be independent in HEP  (Progressing)       Start:  05/07/25    Expected End:  06/18/25            2) Decrease pain in R hand to no more than 4/10 worst in ADL/IADL's  (Progressing)       Start:  05/07/25    Expected End:  06/18/25            3) Increase AROM in R PIP joints at 90* for improved functioning in ADL/IADL's  (Progressing)       Start:  05/07/25    Expected End:  06/18/25             4)Assess  and pinch when appropriate  (Progressing)       Start:  05/07/25    Expected End:  06/18/25                Eileen Sagastume OT

## 2025-05-12 ENCOUNTER — CLINICAL SUPPORT (OUTPATIENT)
Dept: REHABILITATION | Facility: HOSPITAL | Age: 60
End: 2025-05-12
Attending: ORTHOPAEDIC SURGERY
Payer: COMMERCIAL

## 2025-05-12 DIAGNOSIS — M79.641 RIGHT HAND PAIN: Primary | ICD-10-CM

## 2025-05-12 DIAGNOSIS — M25.641 STIFFNESS OF RIGHT HAND JOINT: ICD-10-CM

## 2025-05-12 PROCEDURE — 97110 THERAPEUTIC EXERCISES: CPT

## 2025-05-12 PROCEDURE — 97140 MANUAL THERAPY 1/> REGIONS: CPT

## 2025-05-14 NOTE — PROGRESS NOTES
"  Outpatient Rehab    Occupational Therapy Visit    Patient Name: Johana Wright  MRN: [id number]  YOB: 1965  Encounter Date: 5/12/2025    Therapy Diagnosis:   Encounter Diagnoses   Name Primary?    Right hand pain Yes    Stiffness of right hand joint      Physician: Aysha Nowak MD    Physician Orders: Eval and Treat  Medical Diagnosis: Dupuytren's disease of palm of right hand  Dupuytren's contracture of right hand    Visit # / Visits Authorized: 3 / 20  Insurance Authorization Period: 4/23/2025 to 12/31/2025  Date of Evaluation: 4/30/2025  Plan of Care Certification: 5/7/2025 to 7/30/2025      Time In: 1330   Time Out: 1415  Total Time (in minutes): 45   Total Billable Time (in minutes): 45    FOTO:  Intake Score:  %  Survey Score 2:  %  Survey Score 3:  %    Precautions:       Subjective   "I was able to cut some of the dead skin off; it already feels way better than the L side".  Pain reported as 4/10.      Objective            Treatment:  Therapeutic Exercise  TE 1: Therapist A gentle PROM  TE 2: TGEs, joint blocking, finger lifts, spreads  TE 3: radial finger walks RLF  TE 4: p/h full fists  TE 5: IP blocking  TE 10: provided elastomer putty for night use with orthosis  Manual Therapy  MT 1: IASTM to palm for improved scar mobility  Modalities  Moist Heat (min): Patient tolerates MHP x 10 min in order to decrease pain and increase soft tissue extensibility in preparation for ROM, concurrent with assessment of deficits.    Time Entry(in minutes):  Manual Therapy Time Entry: 15  Therapeutic Exercise Time Entry: 30    Assessment & Plan   Assessment: Pt continues to make good progress; she demo's near full flexion for composite fist. She she conitnues to demo tightness in PIPj at all digits. Increased attention to blocking exercise for improved IP flexion. Elastomer putty formed and provided for night time use with orhtosis for improved scar texture.  Evaluation/Treatment Tolerance: Patient " tolerated treatment well    Patient will continue to benefit from skilled outpatient occupational therapy to address the deficits listed in the problem list box on initial evaluation, provide pt/family education and to maximize pt's level of independence in the home and community environment.     Patient's spiritual, cultural, and educational needs considered and patient agreeable to plan of care and goals.           Plan: Patient would benefit from skilled OT services. Continue POC as tolerated.    Goals:   Active       LTGs       1) Decrease pain in R hand to no more than 2/10 worst in ADL/IADL's  (Progressing)       Start:  05/07/25    Expected End:  07/30/25            2) Increase AROM of composite fist to WFL ( individual digit MARTIN of 270*) for increased functioning in ADL/IADL's  (Progressing)       Start:  05/07/25    Expected End:  07/30/25            3) Decrease edema in R hand to trace or none  (Progressing)       Start:  05/07/25    Expected End:  07/30/25               Orthosis        Pt to independently don/doff orthosis for night wear.  (Met)       Start:  05/01/25    Expected End:  07/24/25    Resolved:  05/07/25         Pt to perform active and passive ROM of digit flexion as HEP (Progressing)       Start:  05/01/25    Expected End:  07/24/25               STGs       1) Patient will be independent in HEP  (Progressing)       Start:  05/07/25    Expected End:  06/18/25            2) Decrease pain in R hand to no more than 4/10 worst in ADL/IADL's  (Progressing)       Start:  05/07/25    Expected End:  06/18/25            3) Increase AROM in R PIP joints at 90* for improved functioning in ADL/IADL's  (Progressing)       Start:  05/07/25    Expected End:  06/18/25            4)Assess  and pinch when appropriate  (Progressing)       Start:  05/07/25    Expected End:  06/18/25                Swati Riley, OT

## 2025-05-20 ENCOUNTER — CLINICAL SUPPORT (OUTPATIENT)
Dept: REHABILITATION | Facility: HOSPITAL | Age: 60
End: 2025-05-20
Attending: ORTHOPAEDIC SURGERY
Payer: COMMERCIAL

## 2025-05-20 DIAGNOSIS — M25.641 STIFFNESS OF RIGHT HAND JOINT: ICD-10-CM

## 2025-05-20 DIAGNOSIS — M25.60 RANGE OF MOTION DEFICIT: ICD-10-CM

## 2025-05-20 DIAGNOSIS — M79.641 HAND PAIN, RIGHT: Primary | ICD-10-CM

## 2025-05-20 PROBLEM — M79.642 HAND PAIN, LEFT: Status: RESOLVED | Noted: 2024-05-21 | Resolved: 2025-05-20

## 2025-05-20 PROBLEM — R29.898 DECREASED GRIP STRENGTH OF LEFT HAND: Status: RESOLVED | Noted: 2024-05-22 | Resolved: 2025-05-20

## 2025-05-20 PROBLEM — M25.642 STIFFNESS OF LEFT HAND JOINT: Status: RESOLVED | Noted: 2024-05-21 | Resolved: 2025-05-20

## 2025-05-20 PROCEDURE — 97140 MANUAL THERAPY 1/> REGIONS: CPT

## 2025-05-20 PROCEDURE — 97110 THERAPEUTIC EXERCISES: CPT

## 2025-05-20 PROCEDURE — 97018 PARAFFIN BATH THERAPY: CPT

## 2025-05-20 NOTE — PROGRESS NOTES
Outpatient Rehab    Occupational Therapy Visit    Patient Name: Johana Wright  MRN: [id number]  YOB: 1965  Encounter Date: 5/20/2025    Therapy Diagnosis:   Encounter Diagnoses   Name Primary?    Hand pain, right Yes    Stiffness of right hand joint     Range of motion deficit      Physician: Aysha Nowak MD    Physician Orders: Eval and Treat  Medical Diagnosis: R palmar fasciotomy 4/24/25    Visit # / Visits Authorized: 2 / 20  Insurance Authorization Period: 3/13/2025 to 12/31/2025  Date of Evaluation: 4/30/2025  Plan of Care Certification: 5/7/2025 to 7/30/2025      Time In: 1115   Time Out: 1200  Total Time (in minutes): 45   Total Billable Time (in minutes):  45    FOTO:not assessed   Intake Score:  %  Survey Score 2:  %  Survey Score 3:  %    Precautions:       Subjective   I'm moving it more, its doing good, I can almost make a fist; the scar is still healing, just little scab.  Pain reported as 3/10.    Patient to bring in orthotic for re-adjustment with elastomer to improve comfort and fit at next session     Objective            Treatment:  Therapeutic Exercise  TE 2: TGEs, joint blocking, finger lifts, spreads x 10 reps each  TE 3: radial finger walks R LFx 10 reps each  TE 4: p/h full fists and digit extension for MF, RF x 10 reps each  TE 5: IP blocking, isolated FDS glide, x 10 reps each, Thumb blocking IP, thumb flexion, ab/ad, opposition x 10 reps each  Manual Therapy  MT 1: DTM and scar massage with massage stick and mini vibrator to decrease adhesions and improve tensile glide  MT 2: mobilization of digits and PROM digits and thumb to improve joint mobility  Modalities  Paraffin Bath: Paraffin bath x 10 min to  hand to increase blood flow, circulation and tissue elasticity prior to therex.    Time Entry(in minutes):  Paraffin Bath Time Entry: 10  Manual Therapy Time Entry: 15  Therapeutic Exercise Time Entry: 20    Assessment & Plan   Assessment: Pt continues to make good  progress; she demo's near full flexion for composite fist. She she conitnues to demo PIP stiffness.  Evaluation/Treatment Tolerance: Patient tolerated treatment well    Patient will continue to benefit from skilled outpatient occupational therapy to address the deficits listed in the problem list box on initial evaluation, provide pt/family education and to maximize pt's level of independence in the home and community environment.     Patient's spiritual, cultural, and educational needs considered and patient agreeable to plan of care and goals.           Plan: Patient would benefit from skilled OT services. Continue POC as tolerated.    Goals:   Active       LTGs       1) Decrease pain in R hand to no more than 2/10 worst in ADL/IADL's  (Progressing)       Start:  05/07/25    Expected End:  07/30/25            2) Increase AROM of composite fist to WFL ( individual digit MARTIN of 270*) for increased functioning in ADL/IADL's  (Progressing)       Start:  05/07/25    Expected End:  07/30/25            3) Decrease edema in R hand to trace or none  (Progressing)       Start:  05/07/25    Expected End:  07/30/25               Orthosis        Pt to independently don/doff orthosis for night wear.  (Met)       Start:  05/01/25    Expected End:  07/24/25    Resolved:  05/07/25         Pt to perform active and passive ROM of digit flexion as HEP (Progressing)       Start:  05/01/25    Expected End:  07/24/25               STGs       1) Patient will be independent in HEP  (Progressing)       Start:  05/07/25    Expected End:  06/18/25            2) Decrease pain in R hand to no more than 4/10 worst in ADL/IADL's  (Progressing)       Start:  05/07/25    Expected End:  06/18/25            3) Increase AROM in R PIP joints at 90* for improved functioning in ADL/IADL's  (Progressing)       Start:  05/07/25    Expected End:  06/18/25            4)Assess  and pinch when appropriate  (Progressing)       Start:  05/07/25    Expected  End:  06/18/25                Mary Ann Wang OT, CHT

## 2025-05-21 ENCOUNTER — CLINICAL SUPPORT (OUTPATIENT)
Dept: REHABILITATION | Facility: HOSPITAL | Age: 60
End: 2025-05-21
Attending: ORTHOPAEDIC SURGERY
Payer: COMMERCIAL

## 2025-05-21 ENCOUNTER — OFFICE VISIT (OUTPATIENT)
Dept: ORTHOPEDICS | Facility: CLINIC | Age: 60
End: 2025-05-21
Payer: COMMERCIAL

## 2025-05-21 VITALS — WEIGHT: 102.94 LBS | HEIGHT: 62 IN | BODY MASS INDEX: 18.94 KG/M2

## 2025-05-21 DIAGNOSIS — Z98.890 POST-OPERATIVE STATE: Primary | ICD-10-CM

## 2025-05-21 DIAGNOSIS — M72.0 DUPUYTREN'S DISEASE OF PALM OF RIGHT HAND: ICD-10-CM

## 2025-05-21 DIAGNOSIS — M25.641 STIFFNESS OF RIGHT HAND JOINT: ICD-10-CM

## 2025-05-21 DIAGNOSIS — M79.641 HAND PAIN, RIGHT: Primary | ICD-10-CM

## 2025-05-21 PROCEDURE — 97110 THERAPEUTIC EXERCISES: CPT

## 2025-05-21 PROCEDURE — 97140 MANUAL THERAPY 1/> REGIONS: CPT

## 2025-05-21 PROCEDURE — 97018 PARAFFIN BATH THERAPY: CPT

## 2025-05-21 PROCEDURE — 99999 PR PBB SHADOW E&M-EST. PATIENT-LVL III: CPT | Mod: PBBFAC,,, | Performed by: ORTHOPAEDIC SURGERY

## 2025-05-21 NOTE — PROGRESS NOTES
Johana Wright presents for post-operative evaluation of   Encounter Diagnoses   Name Primary?    Post-operative state Yes    Dupuytren's disease of palm of right hand    . The patient is now 3 weeks 6 days s/p - 4/24/25 right thumb, index, long and ring finger dupuytrens removal, partial fasciectomy and contracture release.  Overall the patient reports doing well.  The patient reports appropriate postoperative soreness with well controlled overall pain.        Vitals:    05/21/25 0836   PainSc:   6   PainLoc: Hand       PE:    AA&O x 4.  NAD  HEENT:  NCAT, sclera nonicteric  Lungs:  Respirations are equal and unlabored.  CV:  2+ bilateral upper and lower extremity pulses.  MSK: Physical Exam          The incisions of right hand are healing, small eschar in palm.  Full wrist AROM, finger ROM improving, some stiffness right long finger MP joint.  Neurovascularly intact and has 5/5 thenar and intrinsic musculature strength.       Diagnostic studies and other clinical records review:  No new imaging today    Assessment & Plan: Assessment & Plan           Status post above, doing well  OT: note sent with updates, Continue with range of motion; strengthening at 6 weeks, splint adjust  F/U 4 weeks  Call with any questions/concerns in the interim        Aysha Nowak MD    Please be aware that this note has been generated with the assistance of [employer] voice-to-text.  Please excuse any spelling or grammatical errors.  This note was generated with the assistance of ambient listening technology. Verbal consent was obtained by the patient and accompanying visitor(s) for the recording of patient appointment to facilitate this note. I attest to having reviewed and edited the generated note for accuracy, though some syntax or spelling errors may persist. Please contact the author of this note for any clarification.

## 2025-05-21 NOTE — LETTER
[facility]  [address], [address]  [facility] [address]  Phone: [phone]   Johana Wright was seen in clinic 05/21/2025.  The following updated OT orders apply to their ongoing care.    - Continue ROM, scar tissue mobilization (scar tape prn)  - Splint adjustment  - Right long finger trigger finger splint - wear 2 hours on/ 2 hours off   - Can go to 2 x per week, increase to 3 x per week if we have any regression    I appreciate your assistance in their rehabilitation.  Please don't hesitate to call or reach out with questions/concerns.    Sincerely,        Aysha Nowak MD  Hand & Wrist Orthopaedic Surgery  05/21/2025

## 2025-05-22 ENCOUNTER — TELEPHONE (OUTPATIENT)
Dept: ORTHOPEDICS | Facility: CLINIC | Age: 60
End: 2025-05-22
Payer: COMMERCIAL

## 2025-05-22 NOTE — TELEPHONE ENCOUNTER
Patient is wanting to return to work 5/27/25    Patient is 4 weeks' s/p Release, Dupuytren Contracture Thumb, Index, Long And Ring Finger - Right and Release, Contracture Webspace - Right    Patient is able to return to work 5/27/25. No pushing pulling or lifting with RUE, typing and answering phone is permitted.    Baljit Camargo MS, OTC   Sports Medicine Assistant   [facility]  (P) [phone]  (F) [phone]

## 2025-05-28 ENCOUNTER — CLINICAL SUPPORT (OUTPATIENT)
Dept: REHABILITATION | Facility: HOSPITAL | Age: 60
End: 2025-05-28
Payer: COMMERCIAL

## 2025-05-28 DIAGNOSIS — M25.641 STIFFNESS OF RIGHT HAND JOINT: ICD-10-CM

## 2025-05-28 DIAGNOSIS — M79.641 HAND PAIN, RIGHT: Primary | ICD-10-CM

## 2025-05-28 PROCEDURE — 97018 PARAFFIN BATH THERAPY: CPT

## 2025-05-28 PROCEDURE — 97140 MANUAL THERAPY 1/> REGIONS: CPT

## 2025-05-28 PROCEDURE — 97110 THERAPEUTIC EXERCISES: CPT

## 2025-05-28 NOTE — PROGRESS NOTES
Outpatient Rehab    Occupational Therapy Visit    Patient Name: Johana Wright  MRN: [id number]  YOB: 1965  Encounter Date: 5/28/2025    Therapy Diagnosis:   Encounter Diagnoses   Name Primary?    Hand pain, right Yes    Stiffness of right hand joint      Physician: Aysha Nowak MD    Physician Orders: Eval and Treat  Medical Diagnosis: Dupuytren's contracture of left hand    Visit # / Visits Authorized: 1 / 20  Insurance Authorization Period: 3/13/2025 to 12/31/2025  Date of Evaluation: 4/30/2025  Plan of Care Certification: 5/7/2025 to 7/30/2025      Time In: 1245   Time Out: 1330  Total Time (in minutes): 45   Total Billable Time (in minutes): 45    FOTO:  Intake Score:  %  Survey Score 2:  %  Survey Score 3:  %    Precautions:       Subjective   She reports feeling stiff and swollen today after her first day returning to work yesterday..  Pain reported as 3/10.      Objective            Treatment:  Therapeutic Exercise  TE 1: Therapist A gentle PROM  TE 2: TGEs, joint blocking, finger lifts, spreads x 10 reps each  TE 10: splint adjustment for improved extension; radial deviation for night splint. Trial middle finger extension orthosis for day time use  Manual Therapy  MT 1: DTM and scar massage with massage stick and mini vibrator to decrease adhesions and improve tensile glide  MT 2: mobilization of digits and PROM digits and thumb to improve joint mobility  Modalities  Ultrasound: Patient received ultrasound to  palm of hand and fingers area to increase blood flow, circulation, tissue elasticity, pain management and for wound/scar management for 8 minutes @ 3.3Mhz, Intensity .8 w/cm2 at continuous duty cycle.  Paraffin Bath: x10 minutes - For improved circulation and increased tissue flexibility prior to exercises    Time Entry(in minutes):  Paraffin Bath Time Entry: 10  Manual Therapy Time Entry: 15  Therapeutic Exercise Time Entry: 20    Assessment & Plan   Assessment: Ongoing  attention to positioning in orthosis at this time. Digits placed in increased extension at this time. Day time orthosis modified. Plan to refigure a daytime orthosis next visit. Increased attention to scar mobility at this time. Pt encouraged to perform scar massage more frequently. Silicone scar tape provided at this time for daytime use.  Evaluation/Treatment Tolerance: Patient tolerated treatment well    The patient will continue to benefit from skilled outpatient occupational therapy in order to address the deficits listed in the problem list on the initial evaluation, provide patient and family education, and maximize the patients level of independence in the home and community environments.     The patient's spiritual, cultural, and educational needs were considered, and the patient is agreeable to the plan of care and goals.           Plan: Patient would benefit from skilled OT services. Continue POC as tolerated.    Goals:   Active       LTGs       1) Decrease pain in R hand to no more than 2/10 worst in ADL/IADL's  (Progressing)       Start:  05/07/25    Expected End:  07/30/25            2) Increase AROM of composite fist to WFL ( individual digit MARTIN of 270*) for increased functioning in ADL/IADL's  (Progressing)       Start:  05/07/25    Expected End:  07/30/25            3) Decrease edema in R hand to trace or none  (Progressing)       Start:  05/07/25    Expected End:  07/30/25               Orthosis        Pt to independently don/doff orthosis for night wear.  (Met)       Start:  05/01/25    Expected End:  07/24/25    Resolved:  05/07/25         Pt to perform active and passive ROM of digit flexion as HEP (Progressing)       Start:  05/01/25    Expected End:  07/24/25               STGs       1) Patient will be independent in HEP  (Progressing)       Start:  05/07/25    Expected End:  06/18/25            2) Decrease pain in R hand to no more than 4/10 worst in ADL/IADL's  (Progressing)       Start:   05/07/25    Expected End:  06/18/25            3) Increase AROM in R PIP joints at 90* for improved functioning in ADL/IADL's  (Progressing)       Start:  05/07/25    Expected End:  06/18/25            4)Assess  and pinch when appropriate  (Progressing)       Start:  05/07/25    Expected End:  06/18/25                Swati Riley, OT

## 2025-05-29 NOTE — PROGRESS NOTES
Outpatient Rehab    Occupational Therapy Visit    Patient Name: Johana Wright  MRN: [id number]  YOB: 1965  Encounter Date: 5/21/2025    Therapy Diagnosis:   Encounter Diagnoses   Name Primary?    Hand pain, right Yes    Stiffness of right hand joint      Physician: Aysha Nowak MD    Physician Orders: Eval and Treat  Medical Diagnosis: Dupuytren's contracture of left hand    Visit # / Visits Authorized: 3 / 20  Insurance Authorization Period: 3/13/2025 to 12/31/2025  Date of Evaluation: 4/30/2025  Plan of Care Certification: 5/7/2025 to 7/30/2025      Time In: 1000   Time Out: 1045  Total Time (in minutes): 45   Total Billable Time (in minutes): 45    FOTO:  Intake Score:  %  Survey Score 2:  %  Survey Score 3:  %    Precautions:       Subjective   Pt presents following follow up with MD today. She reports sorenss and pain overnight following last session. No evidence of abnormal edema at this time..         Objective            Treatment:  Therapeutic Exercise  TE 10: splint adjustment for improved extension; radial deviation for night splint. Trial middle finger extension orthosis for day time use  Modalities  Paraffin Bath: x10 minutes - For improved circulation and increased tissue flexibility prior to exercises    Time Entry(in minutes):  Paraffin Bath Time Entry: 10  Manual Therapy Time Entry: 15  Therapeutic Exercise Time Entry: 20    Assessment & Plan   Assessment: Extra time taken today to adjust orthosis for imrpoved MP alignment and extension. Webspacer for middle and ring finger fabricated at this time from elastomer putty; foam added for improved MP extension. Trigger finger splint provided at this time for middle finger MP extension; trialed a relative motion extension splint at this time for improved posturing with daily use of hand.  Evaluation/Treatment Tolerance: Patient tolerated treatment well    The patient will continue to benefit from skilled outpatient occupational  therapy in order to address the deficits listed in the problem list on the initial evaluation, provide patient and family education, and maximize the patients level of independence in the home and community environments.     The patient's spiritual, cultural, and educational needs were considered, and the patient is agreeable to the plan of care and goals.           Plan: Patient would benefit from skilled OT services. Continue POC as tolerated.    Goals:   Active       LTGs       1) Decrease pain in R hand to no more than 2/10 worst in ADL/IADL's  (Progressing)       Start:  05/07/25    Expected End:  07/30/25            2) Increase AROM of composite fist to WFL ( individual digit MARTIN of 270*) for increased functioning in ADL/IADL's  (Progressing)       Start:  05/07/25    Expected End:  07/30/25            3) Decrease edema in R hand to trace or none  (Progressing)       Start:  05/07/25    Expected End:  07/30/25               Orthosis        Pt to independently don/doff orthosis for night wear.  (Met)       Start:  05/01/25    Expected End:  07/24/25    Resolved:  05/07/25         Pt to perform active and passive ROM of digit flexion as HEP (Progressing)       Start:  05/01/25    Expected End:  07/24/25               STGs       1) Patient will be independent in HEP  (Progressing)       Start:  05/07/25    Expected End:  06/18/25            2) Decrease pain in R hand to no more than 4/10 worst in ADL/IADL's  (Progressing)       Start:  05/07/25    Expected End:  06/18/25            3) Increase AROM in R PIP joints at 90* for improved functioning in ADL/IADL's  (Progressing)       Start:  05/07/25    Expected End:  06/18/25            4)Assess  and pinch when appropriate  (Progressing)       Start:  05/07/25    Expected End:  06/18/25                Swati Riley OT

## 2025-05-30 ENCOUNTER — CLINICAL SUPPORT (OUTPATIENT)
Dept: REHABILITATION | Facility: HOSPITAL | Age: 60
End: 2025-05-30
Payer: COMMERCIAL

## 2025-05-30 DIAGNOSIS — M79.641 HAND PAIN, RIGHT: Primary | ICD-10-CM

## 2025-05-30 DIAGNOSIS — M25.641 STIFFNESS OF RIGHT HAND JOINT: ICD-10-CM

## 2025-05-30 PROCEDURE — 97018 PARAFFIN BATH THERAPY: CPT

## 2025-05-30 PROCEDURE — 97110 THERAPEUTIC EXERCISES: CPT

## 2025-05-30 PROCEDURE — 97140 MANUAL THERAPY 1/> REGIONS: CPT

## 2025-05-31 NOTE — PROGRESS NOTES
Outpatient Rehab    Occupational Therapy Visit    Patient Name: Johana Wright  MRN: [id number]  YOB: 1965  Encounter Date: 5/30/2025    Therapy Diagnosis:   Encounter Diagnoses   Name Primary?    Hand pain, right Yes    Stiffness of right hand joint      Physician: Aysha Nowak MD    Physician Orders: Eval and Treat  Medical Diagnosis: Dupuytren's contracture of left hand    Visit # / Visits Authorized: 2 / 20  Insurance Authorization Period: 3/13/2025 to 12/31/2025  Date of Evaluation: 4/30/2025  Plan of Care Certification: 5/7/2025 to 7/30/2025      Time In: 1045   Time Out: 1130  Total Time (in minutes): 45   Total Billable Time (in minutes): 45    FOTO:  Intake Score:  %  Survey Score 2:  %  Survey Score 3:  %    Precautions:       Subjective   Pt admits to sleeping without orthosis at this time. She has not been consistent with scar massage. MP flexion noted at rest today..  Pain reported as 3/10.      Objective            Treatment:  Therapeutic Exercise  TE 1: Therapist A gentle PROM  TE 2: TGEs, joint blocking, finger lifts, spreads x 10 reps each  TE 3: radial finger walks R LFx 10 reps each  TE 5: IP blocking, isolated FDS glide, x 10 reps each, Thumb blocking IP, thumb flexion, ab/ad, opposition x 10 reps each  TE 10: splint adjustment for improved extension; radial deviation for night splint. Trial middle finger extension orthosis for day time use  Manual Therapy  MT 1: DTM and scar massage with massage stick and mini vibrator to decrease adhesions and improve tensile glide  MT 2: mobilization of digits and PROM digits and thumb to improve joint mobility  Modalities  Ultrasound: Patient received ultrasound to  palm of hand and fingers area to increase blood flow, circulation, tissue elasticity, pain management and for wound/scar management for 8 minutes @ 3.3Mhz, Intensity .8 w/cm2 at continuous duty cycle.  Paraffin Bath: x10 minutes - For improved circulation and increased  tissue flexibility prior to exercises    Time Entry(in minutes):  Paraffin Bath Time Entry: 10  Manual Therapy Time Entry: 15  Therapeutic Exercise Time Entry: 20    Assessment & Plan   Assessment: Increased attention on scar mobility and digit extension at this time. Pt encouraged to have more dilligence with HEP as decreased progress noted at this time. Day time orthosis fabricated for work use to limit MP flexion at rest. Plan to progress with attention to scar mobility  Evaluation/Treatment Tolerance: Patient tolerated treatment well    The patient will continue to benefit from skilled outpatient occupational therapy in order to address the deficits listed in the problem list on the initial evaluation, provide patient and family education, and maximize the patients level of independence in the home and community environments.     The patient's spiritual, cultural, and educational needs were considered, and the patient is agreeable to the plan of care and goals.           Plan: Continue POC as tolerated.    Goals:   Active       LTGs       1) Decrease pain in R hand to no more than 2/10 worst in ADL/IADL's  (Progressing)       Start:  05/07/25    Expected End:  07/30/25            2) Increase AROM of composite fist to WFL ( individual digit MARTIN of 270*) for increased functioning in ADL/IADL's  (Progressing)       Start:  05/07/25    Expected End:  07/30/25            3) Decrease edema in R hand to trace or none  (Progressing)       Start:  05/07/25    Expected End:  07/30/25               Orthosis        Pt to independently don/doff orthosis for night wear.  (Met)       Start:  05/01/25    Expected End:  07/24/25    Resolved:  05/07/25         Pt to perform active and passive ROM of digit flexion as HEP (Progressing)       Start:  05/01/25    Expected End:  07/24/25               STGs       1) Patient will be independent in HEP  (Progressing)       Start:  05/07/25    Expected End:  06/18/25            2)  Decrease pain in R hand to no more than 4/10 worst in ADL/IADL's  (Progressing)       Start:  05/07/25    Expected End:  06/18/25            3) Increase AROM in R PIP joints at 90* for improved functioning in ADL/IADL's  (Progressing)       Start:  05/07/25    Expected End:  06/18/25            4)Assess  and pinch when appropriate  (Progressing)       Start:  05/07/25    Expected End:  06/18/25                Swati Riley OT

## 2025-06-02 ENCOUNTER — CLINICAL SUPPORT (OUTPATIENT)
Dept: REHABILITATION | Facility: HOSPITAL | Age: 60
End: 2025-06-02
Payer: COMMERCIAL

## 2025-06-02 DIAGNOSIS — M79.641 HAND PAIN, RIGHT: Primary | ICD-10-CM

## 2025-06-02 DIAGNOSIS — M25.641 STIFFNESS OF RIGHT HAND JOINT: ICD-10-CM

## 2025-06-02 PROCEDURE — 97140 MANUAL THERAPY 1/> REGIONS: CPT

## 2025-06-02 PROCEDURE — 97018 PARAFFIN BATH THERAPY: CPT

## 2025-06-02 PROCEDURE — 97110 THERAPEUTIC EXERCISES: CPT

## 2025-06-02 PROCEDURE — 97035 APP MDLTY 1+ULTRASOUND EA 15: CPT

## 2025-06-05 ENCOUNTER — CLINICAL SUPPORT (OUTPATIENT)
Dept: REHABILITATION | Facility: HOSPITAL | Age: 60
End: 2025-06-05
Payer: COMMERCIAL

## 2025-06-05 DIAGNOSIS — M25.641 STIFFNESS OF RIGHT HAND JOINT: ICD-10-CM

## 2025-06-05 DIAGNOSIS — M79.641 HAND PAIN, RIGHT: Primary | ICD-10-CM

## 2025-06-05 PROCEDURE — 97110 THERAPEUTIC EXERCISES: CPT

## 2025-06-05 PROCEDURE — 97140 MANUAL THERAPY 1/> REGIONS: CPT

## 2025-06-05 PROCEDURE — 97763 ORTHC/PROSTC MGMT SBSQ ENC: CPT

## 2025-06-10 ENCOUNTER — CLINICAL SUPPORT (OUTPATIENT)
Dept: REHABILITATION | Facility: HOSPITAL | Age: 60
End: 2025-06-10
Payer: COMMERCIAL

## 2025-06-10 DIAGNOSIS — M79.641 HAND PAIN, RIGHT: Primary | ICD-10-CM

## 2025-06-10 DIAGNOSIS — M25.641 STIFFNESS OF RIGHT HAND JOINT: ICD-10-CM

## 2025-06-10 PROCEDURE — 97110 THERAPEUTIC EXERCISES: CPT

## 2025-06-10 PROCEDURE — 97018 PARAFFIN BATH THERAPY: CPT

## 2025-06-10 PROCEDURE — 97140 MANUAL THERAPY 1/> REGIONS: CPT

## 2025-06-17 ENCOUNTER — CLINICAL SUPPORT (OUTPATIENT)
Dept: REHABILITATION | Facility: HOSPITAL | Age: 60
End: 2025-06-17
Payer: COMMERCIAL

## 2025-06-17 DIAGNOSIS — M79.641 HAND PAIN, RIGHT: Primary | ICD-10-CM

## 2025-06-17 DIAGNOSIS — M25.641 STIFFNESS OF RIGHT HAND JOINT: ICD-10-CM

## 2025-06-17 PROCEDURE — 97035 APP MDLTY 1+ULTRASOUND EA 15: CPT

## 2025-06-17 PROCEDURE — 97140 MANUAL THERAPY 1/> REGIONS: CPT

## 2025-06-17 PROCEDURE — 97110 THERAPEUTIC EXERCISES: CPT

## 2025-06-24 ENCOUNTER — CLINICAL SUPPORT (OUTPATIENT)
Dept: REHABILITATION | Facility: HOSPITAL | Age: 60
End: 2025-06-24
Payer: COMMERCIAL

## 2025-06-24 DIAGNOSIS — M25.641 STIFFNESS OF RIGHT HAND JOINT: ICD-10-CM

## 2025-06-24 DIAGNOSIS — M79.641 HAND PAIN, RIGHT: Primary | ICD-10-CM

## 2025-06-24 PROCEDURE — 97035 APP MDLTY 1+ULTRASOUND EA 15: CPT

## 2025-06-24 PROCEDURE — 97140 MANUAL THERAPY 1/> REGIONS: CPT

## 2025-06-24 PROCEDURE — 97110 THERAPEUTIC EXERCISES: CPT

## 2025-06-24 NOTE — PROGRESS NOTES
Outpatient Rehab    Occupational Therapy Progress Note    Patient Name: Johana Wright  MRN: [id number]  YOB: 1965  Encounter Date: 6/24/2025    Therapy Diagnosis:   Encounter Diagnoses   Name Primary?    Hand pain, right Yes    Stiffness of right hand joint      Physician: Aysha Nowak MD    Physician Orders: Eval and Treat  Medical Diagnosis: Dupuytren's contracture of left hand  Surgical Diagnosis: Not applicable for this Episode   Surgical Date: 4/24/2025  Days Since Last Surgery: 61    Visit # / Visits Authorized: 7 / 20  Insurance Authorization Period: 3/13/2025 to 12/31/2025  Date of Evaluation: 4/30/2025  Plan of Care Certification: 5/7/2025 to 7/30/2025      Time In: 1115   Time Out: 1200  Total Time (in minutes): 45   Total Billable Time (in minutes): 45    FOTO:  Intake Score:  %  Survey Score 2:  %  Survey Score 3:  %    Precautions:       Subjective   Pt continues to have tenderness and tightness at long a nd ring fingers likely secondary to scar tissue. She reports compliance with night extension splint and alternation of MP extension/ ulnar drift orthosis and LMB for day time use..  Pain reported as 4/10.      Objective       Digit 2 - Index Finger Active Range of Motion  Right Index Finger   Extension (deg) Flexion (deg) Pain   MCP   100     PIP   91     DIP   65     MARTIN:        Digit 3 - Middle Finger Active Range of Motion  Right Middle Finger   Extension (deg) Flexion (deg) Pain   MCP -30 102     PIP -12 84 Yes   DIP   65     MARTIN:        Digit 4 - Ring Finger Active Range of Motion  Right Ring Finger   Extension (deg) Flexion (deg) Pain   MCP -12 96     PIP -20 85 Yes   DIP   39     MARTIN:        Digit 5 - Little Finger Active Range of Motion  Right Little Finger   Extension (deg) Flexion (deg) Pain   MCP   100     PIP   75     DIP   42     MARTIN:                          Right  Strength  Right Hand Dynamometer Position: 2  Elbow Position Forearm Position Trial 1 (lbs) Trial  2  (lbs) Trial 3  (lbs) Average  (lbs) Pain   Flexed Neutral 23 23 23 23         Left  Strength  Left Hand Dynamometer Position: 2  Elbow Position Forearm Position Trial 1 (lbs) Trial 2 (lbs) Trial 3 (lbs) Average (lbs) Pain   Flexed Neutral 25 25 25 25         Right Pinch Strength   Trial 1 (lbs) Trial 2 (lbs) Trial 3 (lbs) Average (lbs) Pain   Lateral (Key Pinch) 10           Three Point (Three Jaw Master) 8           Two Point (Tip to Tip)                 Left Pinch Strength   Trial 1 (lbs) Trial 2 (lbs) Trial 3 (lbs) Average (lbs) Pain   Lateral (Key Pinch) 10           Three Point (Three Jaw Master) 12           Two Point (Tip to Tip)                           Treatment:  Therapeutic Exercise  TE 1: Therapist A gentle PROM  TE 2: TGEs, joint blocking, finger lifts, spreads x 10 reps each  TE 3: radial finger walks R LFx 10 reps each  TE 5: IP blocking, isolated FDS glide, x 10 reps each, Thumb blocking IP, thumb flexion, ab/ad, opposition x 10 reps each  TE 6: Initiated Pink theraputty pulls digits 3-5  TE 7: digiflex; isospheres; place and holds in flexion  Manual Therapy  MT 1: DTM and scar massage with massage stick and mini vibrator to decrease adhesions and improve tensile glide  MT 2: mobilization of digits and PROM digits and thumb to improve joint mobility  Modalities  Ultrasound: Patient received ultrasound to  palm of hand and fingers area to increase blood flow, circulation, tissue elasticity, pain management and for wound/scar management for 8 minutes @ 3.3Mhz, Intensity .8 w/cm2 at continuous duty cycle.  Paraffin Bath: x10 minutes - For improved circulation and increased tissue flexibility prior to exercises    Time Entry(in minutes):  Ultrasound Time Entry: 10  Manual Therapy Time Entry: 15  Therapeutic Exercise Time Entry: 30    Assessment & Plan   Assessment: Modifications made to night orthosis at this time for decreased wrist irritations for improved compliance for full night wear. Pt  continues to make good progress with digit flexion. She continues to be limited due to hyper sensitivity and scar adhesions. Therapist initiated gentle strengthening at this time for improved excursion of flexor tendons. Pt requires increased rest and alternating exercises to avoid contact and irritation to palmar side of hand. Strength assessed at this time and noted to be comparable to non dominant L hand.        The patient will continue to benefit from skilled outpatient occupational therapy in order to address the deficits listed in the problem list on the initial evaluation, provide patient and family education, and maximize the patients level of independence in the home and community environments.     The patient's spiritual, cultural, and educational needs were considered, and the patient is agreeable to the plan of care and goals.           Plan: Continue POC as tolerated.    Goals:   Active       LTGs       1) Decrease pain in R hand to no more than 2/10 worst in ADL/IADL's  (Progressing)       Start:  05/07/25    Expected End:  07/30/25            2) Increase AROM of composite fist to WFL ( individual digit MARTIN of 270*) for increased functioning in ADL/IADL's  (Progressing)       Start:  05/07/25    Expected End:  07/30/25            3) Decrease edema in R hand to trace or none  (Progressing)       Start:  05/07/25    Expected End:  07/30/25              Resolved       Orthosis        Pt to independently don/doff orthosis for night wear.  (Met)       Start:  05/01/25    Expected End:  07/24/25    Resolved:  05/07/25         Pt to perform active and passive ROM of digit flexion as HEP (Met)       Start:  05/01/25    Expected End:  07/24/25    Resolved:  06/24/25            STGs       1) Patient will be independent in HEP  (Met)       Start:  05/07/25    Expected End:  06/18/25    Resolved:  06/24/25         2) Decrease pain in R hand to no more than 4/10 worst in ADL/IADL's  (Met)       Start:  05/07/25     Expected End:  06/18/25    Resolved:  06/24/25         3) Increase AROM in R PIP joints at 90* for improved functioning in ADL/IADL's  (Met)       Start:  05/07/25    Expected End:  06/18/25    Resolved:  06/24/25         4)Assess  and pinch when appropriate  (Met)       Start:  05/07/25    Expected End:  06/18/25    Resolved:  06/24/25             Swati Riley, OT

## 2025-06-25 ENCOUNTER — OFFICE VISIT (OUTPATIENT)
Dept: ORTHOPEDICS | Facility: CLINIC | Age: 60
End: 2025-06-25
Payer: COMMERCIAL

## 2025-06-25 ENCOUNTER — TELEPHONE (OUTPATIENT)
Dept: ORTHOPEDICS | Facility: CLINIC | Age: 60
End: 2025-06-25
Payer: COMMERCIAL

## 2025-06-25 DIAGNOSIS — Z98.890 POST-OPERATIVE STATE: Primary | ICD-10-CM

## 2025-06-25 DIAGNOSIS — M72.0 DUPUYTREN'S CONTRACTURE OF RIGHT HAND: ICD-10-CM

## 2025-06-25 DIAGNOSIS — M79.641 CHRONIC PAIN OF RIGHT HAND: ICD-10-CM

## 2025-06-25 DIAGNOSIS — G89.29 CHRONIC PAIN OF RIGHT HAND: ICD-10-CM

## 2025-06-25 PROCEDURE — 1160F RVW MEDS BY RX/DR IN RCRD: CPT | Mod: CPTII,S$GLB,, | Performed by: ORTHOPAEDIC SURGERY

## 2025-06-25 PROCEDURE — 1159F MED LIST DOCD IN RCRD: CPT | Mod: CPTII,S$GLB,, | Performed by: ORTHOPAEDIC SURGERY

## 2025-06-25 PROCEDURE — 99999 PR PBB SHADOW E&M-EST. PATIENT-LVL III: CPT | Mod: PBBFAC,,, | Performed by: ORTHOPAEDIC SURGERY

## 2025-06-25 PROCEDURE — 99024 POSTOP FOLLOW-UP VISIT: CPT | Mod: S$GLB,,, | Performed by: ORTHOPAEDIC SURGERY

## 2025-06-25 NOTE — PROGRESS NOTES
Johnaa Wright presents for post-operative evaluation of   Encounter Diagnoses   Name Primary?    Post-operative state Yes    Dupuytren's contracture of right hand     Chronic pain of right hand    . The patient is now 8 weeks 6 days s/p Release, Dupuytren Contracture Thumb, Index, Long And Ring Finger - Right and Release, Contracture Webspace - Right    Overall the patient reports doing well.    History of Present Illness    HPI:  Patient presents for follow-up of hand surgery. She reports expected weakness in the hand at this stage of recovery. She has been performing strengthening exercises and measurements with Swati. She began using putty for exercises the previous day. She wears a splint during the day to help keep the fingers up and another at night.  She has been using scar tape. She reports difficulty typing for extended periods while wearing the day splint.         Vitals:    06/25/25 1002   PainSc:   3   PainLoc: Finger       PE:    AA&O x 4.  NAD  HEENT:  NCAT, sclera nonicteric  Lungs:  Respirations are equal and unlabored.  CV:  2+ bilateral upper and lower extremity pulses.  MSK: Physical Exam    MSK: Hand/Wrist - Left: Long finger tight at MP joint. Ring finger tight at PIP joint. Decreased  strength.    Healed incisions right hand and fingers, ROM improving fingers. NVI.    Diagnostic studies and other clinical records review:  6/24/25 OT visit: tenderness and tightness at long and ring fingers likely secondary to scar tissue. She reports compliance with night extension splint and alternation of MP extension/ ulnar drift orthosis and LMB for day time use     Assessment & Plan: Assessment & Plan    PATIENT INSTRUCTIONS:  - Use night splint for 2 more months.  - Continue scar tape as directed.  - Use putty for hand exercises.  - Use day splint regularly, removing only when extensive typing is required.        Status post above, doing well  Continue with range of motion and  strengthening  F/U 8 weeks   Call with any questions/concerns in the interim        Aysha Nowak MD    Please be aware that this note has been generated with the assistance of [employer] voice-to-text.  Please excuse any spelling or grammatical errors.  This note was generated with the assistance of ambient listening technology. Verbal consent was obtained by the patient and accompanying visitor(s) for the recording of patient appointment to facilitate this note. I attest to having reviewed and edited the generated note for accuracy, though some syntax or spelling errors may persist. Please contact the author of this note for any clarification.

## 2025-06-25 NOTE — PROGRESS NOTES
Outpatient Rehab    Occupational Therapy Visit    Patient Name: Johana Wright  MRN: [id number]  YOB: 1965  Encounter Date: 6/5/2025    Therapy Diagnosis:   Encounter Diagnoses   Name Primary?    Hand pain, right Yes    Stiffness of right hand joint      Physician: Aysha Nowak MD    Physician Orders: Eval and Treat  Medical Diagnosis: Dupuytren's contracture of left hand  Surgical Diagnosis: Not applicable for this Episode   Surgical Date: 4/24/2025  Days Since Last Surgery: 62    Visit # / Visits Authorized: 7 / 20  Insurance Authorization Period: 3/13/2025 to 12/31/2025  Date of Evaluation: 4/30/2025  Plan of Care Certification: 5/7/2025 to 7/30/2025      Time In: 1300   Time Out: 1345  Total Time (in minutes): 45   Total Billable Time (in minutes): 45    FOTO:  Intake Score:  %  Survey Score 2:  %  Survey Score 3:  %    Precautions:       Subjective   Pt continues to have soreness at wrist and ring and long finger..  Pain reported as 4/10.      Objective            Treatment:  Therapeutic Exercise  TE 1: Therapist A gentle PROM  TE 2: TGEs, joint blocking, finger lifts, spreads x 10 reps each  TE 3: radial finger walks R LFx 10 reps each  TE 5: IP blocking, isolated FDS glide, x 10 reps each, Thumb blocking IP, thumb flexion, ab/ad, opposition x 10 reps each  Modalities  Ultrasound: Patient received ultrasound to  palm of hand and fingers area to increase blood flow, circulation, tissue elasticity, pain management and for wound/scar management for 8 minutes @ 3.3Mhz, Intensity .8 w/cm2 at continuous duty cycle.  Paraffin Bath: x10 minutes - For improved circulation and increased tissue flexibility prior to exercises    Time Entry(in minutes):  Paraffin Bath Time Entry: 10  Manual Therapy Time Entry: 15  Therapeutic Exercise Time Entry: 20    Assessment & Plan   Assessment: Pt continues to make mild progress. She continues to have ongoing tenderness at palm. Plan to progress with  strengthening when appropriate.        The patient will continue to benefit from skilled outpatient occupational therapy in order to address the deficits listed in the problem list on the initial evaluation, provide patient and family education, and maximize the patients level of independence in the home and community environments.     The patient's spiritual, cultural, and educational needs were considered, and the patient is agreeable to the plan of care and goals.           Plan: Continue POC as tolerated.    Goals:   Active       LTGs       1) Decrease pain in R hand to no more than 2/10 worst in ADL/IADL's  (Progressing)       Start:  05/07/25    Expected End:  07/30/25            2) Increase AROM of composite fist to WFL ( individual digit MARTIN of 270*) for increased functioning in ADL/IADL's  (Progressing)       Start:  05/07/25    Expected End:  07/30/25            3) Decrease edema in R hand to trace or none  (Progressing)       Start:  05/07/25    Expected End:  07/30/25              Resolved       Orthosis        Pt to independently don/doff orthosis for night wear.  (Met)       Start:  05/01/25    Expected End:  07/24/25    Resolved:  05/07/25         Pt to perform active and passive ROM of digit flexion as HEP (Met)       Start:  05/01/25    Expected End:  07/24/25    Resolved:  06/24/25            STGs       1) Patient will be independent in HEP  (Met)       Start:  05/07/25    Expected End:  06/18/25    Resolved:  06/24/25         2) Decrease pain in R hand to no more than 4/10 worst in ADL/IADL's  (Met)       Start:  05/07/25    Expected End:  06/18/25    Resolved:  06/24/25         3) Increase AROM in R PIP joints at 90* for improved functioning in ADL/IADL's  (Met)       Start:  05/07/25    Expected End:  06/18/25    Resolved:  06/24/25         4)Assess  and pinch when appropriate  (Met)       Start:  05/07/25    Expected End:  06/18/25    Resolved:  06/24/25             Swati Riley  OT

## 2025-06-25 NOTE — TELEPHONE ENCOUNTER
Attempted to call patient to confirm appointment date and time. No answer. Left details on voicemail.    Baljit Camargo MS, OTC   Sports Medicine Assistant   [facility]  (P) [phone]  (F) [phone]

## 2025-06-30 NOTE — PROGRESS NOTES
Outpatient Rehab    Occupational Therapy Visit    Patient Name: Johana Wright  MRN: [id number]  YOB: 1965  Encounter Date: 6/10/2025    Therapy Diagnosis:   Encounter Diagnoses   Name Primary?    Hand pain, right Yes    Stiffness of right hand joint      Physician: Aysha Nowak MD    Physician Orders: Eval and Treat  Medical Diagnosis: Dupuytren's contracture of left hand  Surgical Diagnosis: Not applicable for this Episode   Surgical Date: 4/24/2025  Days Since Last Surgery: 67    Visit # / Visits Authorized: 7 / 20  Insurance Authorization Period: 3/13/2025 to 12/31/2025  Date of Evaluation: 4/30/2025  Plan of Care Certification: 5/7/2025 to 7/30/2025      Time In: 1030   Time Out: 1115  Total Time (in minutes): 45   Total Billable Time (in minutes): 45    FOTO:  Intake Score:  %  Survey Score 2:  %  Survey Score 3:  %    Precautions:       Subjective   Pt continues to have tenderness at palm; however motion continues to improve.  Pain reported as 4/10.      Objective            Treatment:  Therapeutic Exercise  TE 1: Therapist A gentle PROM  TE 2: TGEs, joint blocking, finger lifts, spreads x 10 reps each  TE 3: radial finger walks R LFx 10 reps each  TE 5: IP blocking, isolated FDS glide, x 10 reps each, Thumb blocking IP, thumb flexion, ab/ad, opposition x 10 reps each  TE 6: Initiated Pink theraputty pulls digits 3-5  TE 7: digiflex; isospheres; place and holds in flexion  Manual Therapy  MT 1: DTM and scar massage with massage stick and mini vibrator to decrease adhesions and improve tensile glide  MT 2: mobilization of digits and PROM digits and thumb to improve joint mobility  Modalities  Ultrasound: Patient received ultrasound to  palm of hand and fingers area to increase blood flow, circulation, tissue elasticity, pain management and for wound/scar management for 8 minutes @ 3.3Mhz, Intensity .8 w/cm2 at continuous duty cycle.  Paraffin Bath: x10 minutes - For improved circulation  and increased tissue flexibility prior to exercises    Time Entry(in minutes):  Paraffin Bath Time Entry: 10  Manual Therapy Time Entry: 15  Therapeutic Exercise Time Entry: 20    Assessment & Plan   Assessment: Pt with improving flexion at this time.  Extension continues to be limited at middle and ring MP joints. Plan to progress with attention to scar progression with improving mobility.        The patient will continue to benefit from skilled outpatient occupational therapy in order to address the deficits listed in the problem list on the initial evaluation, provide patient and family education, and maximize the patients level of independence in the home and community environments.     The patient's spiritual, cultural, and educational needs were considered, and the patient is agreeable to the plan of care and goals.           Plan: Continue POC as tolerated.    Goals:   Active       LTGs       1) Decrease pain in R hand to no more than 2/10 worst in ADL/IADL's  (Progressing)       Start:  05/07/25    Expected End:  07/30/25            2) Increase AROM of composite fist to WFL ( individual digit MARTIN of 270*) for increased functioning in ADL/IADL's  (Progressing)       Start:  05/07/25    Expected End:  07/30/25            3) Decrease edema in R hand to trace or none  (Progressing)       Start:  05/07/25    Expected End:  07/30/25              Resolved       Orthosis        Pt to independently don/doff orthosis for night wear.  (Met)       Start:  05/01/25    Expected End:  07/24/25    Resolved:  05/07/25         Pt to perform active and passive ROM of digit flexion as HEP (Met)       Start:  05/01/25    Expected End:  07/24/25    Resolved:  06/24/25            STGs       1) Patient will be independent in HEP  (Met)       Start:  05/07/25    Expected End:  06/18/25    Resolved:  06/24/25         2) Decrease pain in R hand to no more than 4/10 worst in ADL/IADL's  (Met)       Start:  05/07/25    Expected End:   06/18/25    Resolved:  06/24/25         3) Increase AROM in R PIP joints at 90* for improved functioning in ADL/IADL's  (Met)       Start:  05/07/25    Expected End:  06/18/25    Resolved:  06/24/25         4)Assess  and pinch when appropriate  (Met)       Start:  05/07/25    Expected End:  06/18/25    Resolved:  06/24/25             Swati Riley, OT

## 2025-07-14 NOTE — PROGRESS NOTES
Outpatient Rehab    Occupational Therapy Visit    Patient Name: Johana Wright  MRN: [id number]  YOB: 1965  Encounter Date: 6/17/2025    Therapy Diagnosis:   Encounter Diagnoses   Name Primary?    Hand pain, right Yes    Stiffness of right hand joint      Physician: Aysha Nowak MD    Physician Orders: Eval and Treat  Medical Diagnosis: Dupuytren's contracture of left hand  Surgical Diagnosis: Not applicable for this Episode   Surgical Date: 4/24/2025  Days Since Last Surgery: 80    Visit # / Visits Authorized: 7 / 20  Insurance Authorization Period: 3/13/2025 to 12/31/2025  Date of Evaluation: 4/30/2025  Plan of Care Certification: 5/7/2025 to 7/30/2025      Time In: 1115   Time Out: 1200  Total Time (in minutes): 45   Total Billable Time (in minutes): 45    FOTO:  Intake Score:  %  Survey Score 2:  %  Survey Score 3:  %    Precautions:       Subjective   Pt presents with mild soreness at this time. Tenderness and itching reports at palmar scars..         Objective            Treatment:  Therapeutic Exercise  TE 1: Therapist A gentle PROM  TE 2: TGEs, joint blocking, finger lifts, spreads x 10 reps each  TE 3: radial finger walks R LFx 10 reps each  TE 5: IP blocking, isolated FDS glide, x 10 reps each, Thumb blocking IP, thumb flexion, ab/ad, opposition x 10 reps each  TE 6: Pink theraputty pulls digits 3-5  TE 7: digiflex; isospheres; place and holds in flexion  Manual Therapy  MT 1: DTM and scar massage with massage stick and mini vibrator to decrease adhesions and improve tensile glide  MT 2: mobilization of digits and PROM digits and thumb to improve joint mobility  Modalities  Ultrasound: Patient received ultrasound to  palm of hand and fingers area to increase blood flow, circulation, tissue elasticity, pain management and for wound/scar management for 8 minutes @ 3.3Mhz, Intensity .8 w/cm2 at continuous duty cycle.  Paraffin Bath: x10 minutes - For improved circulation and increased  tissue flexibility prior to exercises    Time Entry(in minutes):  Paraffin Bath Time Entry: 10  Manual Therapy Time Entry: 15  Therapeutic Exercise Time Entry: 20    Assessment & Plan   Assessment: Pt tolerates increased PROM of all digits. Orthoses adjusted for decreased pressure points at this time. She noted improved maintenance of digit ROM at this time with orthoses used for support of MPJs and alignment for daytime use. Plan to progress with increased strengthening as tolerated.        The patient will continue to benefit from skilled outpatient occupational therapy in order to address the deficits listed in the problem list on the initial evaluation, provide patient and family education, and maximize the patients level of independence in the home and community environments.     The patient's spiritual, cultural, and educational needs were considered, and the patient is agreeable to the plan of care and goals.           Plan: Continue POC as tolerated.    Goals:   Active       LTGs       1) Decrease pain in R hand to no more than 2/10 worst in ADL/IADL's  (Progressing)       Start:  05/07/25    Expected End:  07/30/25            2) Increase AROM of composite fist to WFL ( individual digit MARTIN of 270*) for increased functioning in ADL/IADL's  (Progressing)       Start:  05/07/25    Expected End:  07/30/25            3) Decrease edema in R hand to trace or none  (Progressing)       Start:  05/07/25    Expected End:  07/30/25              Resolved       Orthosis        Pt to independently don/doff orthosis for night wear.  (Met)       Start:  05/01/25    Expected End:  07/24/25    Resolved:  05/07/25         Pt to perform active and passive ROM of digit flexion as HEP (Met)       Start:  05/01/25    Expected End:  07/24/25    Resolved:  06/24/25            STGs       1) Patient will be independent in HEP  (Met)       Start:  05/07/25    Expected End:  06/18/25    Resolved:  06/24/25         2) Decrease pain in R  hand to no more than 4/10 worst in ADL/IADL's  (Met)       Start:  05/07/25    Expected End:  06/18/25    Resolved:  06/24/25         3) Increase AROM in R PIP joints at 90* for improved functioning in ADL/IADL's  (Met)       Start:  05/07/25    Expected End:  06/18/25    Resolved:  06/24/25         4)Assess  and pinch when appropriate  (Met)       Start:  05/07/25    Expected End:  06/18/25    Resolved:  06/24/25             Swati Riley, OT

## 2025-07-22 ENCOUNTER — CLINICAL SUPPORT (OUTPATIENT)
Dept: REHABILITATION | Facility: HOSPITAL | Age: 60
End: 2025-07-22
Payer: COMMERCIAL

## 2025-07-22 DIAGNOSIS — M79.641 HAND PAIN, RIGHT: Primary | ICD-10-CM

## 2025-07-22 DIAGNOSIS — M25.641 STIFFNESS OF RIGHT HAND JOINT: ICD-10-CM

## 2025-07-22 PROCEDURE — 97110 THERAPEUTIC EXERCISES: CPT

## 2025-07-22 PROCEDURE — 97140 MANUAL THERAPY 1/> REGIONS: CPT

## 2025-07-22 PROCEDURE — 97018 PARAFFIN BATH THERAPY: CPT

## 2025-07-24 ENCOUNTER — CLINICAL SUPPORT (OUTPATIENT)
Dept: REHABILITATION | Facility: HOSPITAL | Age: 60
End: 2025-07-24
Payer: COMMERCIAL

## 2025-07-24 DIAGNOSIS — M25.641 STIFFNESS OF RIGHT HAND JOINT: ICD-10-CM

## 2025-07-24 DIAGNOSIS — M79.641 HAND PAIN, RIGHT: Primary | ICD-10-CM

## 2025-07-24 PROCEDURE — 97110 THERAPEUTIC EXERCISES: CPT

## 2025-07-24 PROCEDURE — 97035 APP MDLTY 1+ULTRASOUND EA 15: CPT

## 2025-07-24 PROCEDURE — 97140 MANUAL THERAPY 1/> REGIONS: CPT

## 2025-07-29 ENCOUNTER — CLINICAL SUPPORT (OUTPATIENT)
Dept: REHABILITATION | Facility: HOSPITAL | Age: 60
End: 2025-07-29
Payer: COMMERCIAL

## 2025-07-29 DIAGNOSIS — M25.641 STIFFNESS OF RIGHT HAND JOINT: ICD-10-CM

## 2025-07-29 DIAGNOSIS — M79.641 HAND PAIN, RIGHT: Primary | ICD-10-CM

## 2025-07-29 PROCEDURE — 97018 PARAFFIN BATH THERAPY: CPT

## 2025-07-29 PROCEDURE — 97140 MANUAL THERAPY 1/> REGIONS: CPT

## 2025-07-29 PROCEDURE — 97110 THERAPEUTIC EXERCISES: CPT

## 2025-07-31 ENCOUNTER — TELEPHONE (OUTPATIENT)
Dept: ORTHOPEDICS | Facility: CLINIC | Age: 60
End: 2025-07-31
Payer: COMMERCIAL

## 2025-07-31 DIAGNOSIS — M25.642 FINGER STIFFNESS, LEFT: ICD-10-CM

## 2025-07-31 DIAGNOSIS — M79.641 BILATERAL HAND PAIN: ICD-10-CM

## 2025-07-31 DIAGNOSIS — M72.0 DUPUYTREN'S CONTRACTURE OF RIGHT HAND: Primary | ICD-10-CM

## 2025-07-31 DIAGNOSIS — M79.642 BILATERAL HAND PAIN: ICD-10-CM

## 2025-08-04 ENCOUNTER — CLINICAL SUPPORT (OUTPATIENT)
Dept: REHABILITATION | Facility: HOSPITAL | Age: 60
End: 2025-08-04
Payer: COMMERCIAL

## 2025-08-04 DIAGNOSIS — M79.641 HAND PAIN, RIGHT: Primary | ICD-10-CM

## 2025-08-04 DIAGNOSIS — M25.641 STIFFNESS OF RIGHT HAND JOINT: ICD-10-CM

## 2025-08-04 PROCEDURE — 97110 THERAPEUTIC EXERCISES: CPT

## 2025-08-04 PROCEDURE — 97140 MANUAL THERAPY 1/> REGIONS: CPT

## 2025-08-04 PROCEDURE — 97035 APP MDLTY 1+ULTRASOUND EA 15: CPT

## 2025-08-12 ENCOUNTER — PATIENT MESSAGE (OUTPATIENT)
Dept: INTERNAL MEDICINE | Facility: CLINIC | Age: 60
End: 2025-08-12
Payer: COMMERCIAL

## 2025-08-15 ENCOUNTER — TELEPHONE (OUTPATIENT)
Dept: ORTHOPEDICS | Facility: CLINIC | Age: 60
End: 2025-08-15
Payer: COMMERCIAL

## 2025-08-19 ENCOUNTER — CLINICAL SUPPORT (OUTPATIENT)
Dept: REHABILITATION | Facility: HOSPITAL | Age: 60
End: 2025-08-19
Payer: COMMERCIAL

## 2025-08-19 DIAGNOSIS — M79.641 HAND PAIN, RIGHT: Primary | ICD-10-CM

## 2025-08-19 DIAGNOSIS — M25.641 STIFFNESS OF RIGHT HAND JOINT: ICD-10-CM

## 2025-08-19 PROCEDURE — 97140 MANUAL THERAPY 1/> REGIONS: CPT

## 2025-08-19 PROCEDURE — 97018 PARAFFIN BATH THERAPY: CPT

## 2025-08-19 PROCEDURE — 97110 THERAPEUTIC EXERCISES: CPT

## 2025-09-02 ENCOUNTER — TELEPHONE (OUTPATIENT)
Dept: ORTHOPEDICS | Facility: CLINIC | Age: 60
End: 2025-09-02
Payer: COMMERCIAL

## (undated) DEVICE — PAD CAST SPECIALIST STRL 3

## (undated) DEVICE — DRAPE STERI U-SHAPED 47X51IN

## (undated) DEVICE — SEE MEDLINE ITEM 157196

## (undated) DEVICE — SOL IRR SOD CHL .9% POUR

## (undated) DEVICE — TOWEL OR DISP STRL BLUE 4/PK

## (undated) DEVICE — FORCEP STRAIGHT DISP

## (undated) DEVICE — SOL 9P NACL IRR PIC IL

## (undated) DEVICE — APPLICATOR CHLORAPREP ORN 26ML

## (undated) DEVICE — DRESSING N ADH OIL EMUL 3X3

## (undated) DEVICE — GLOVE BIOGEL PI MICRO SZ 7

## (undated) DEVICE — GLOVE BIOGEL SKINSENSE PI 7.5

## (undated) DEVICE — NDL HYPO REG 25G X 1 1/2

## (undated) DEVICE — NDL SPINAL 20GX3.5 HUB

## (undated) DEVICE — ELECTRODE REM PLYHSV RETURN 9

## (undated) DEVICE — SPONGE COTTON TRAY 4X4IN

## (undated) DEVICE — TUBING 4-LEAD ARTHOSCOPY

## (undated) DEVICE — GLOVE BIOGEL SKINSENSE PI 6.5

## (undated) DEVICE — BLADE VORTEX SHAVER 4.5MMX13CM

## (undated) DEVICE — Device

## (undated) DEVICE — BLADE SCALP OPHTL BEVEL STR

## (undated) DEVICE — PENCIL ELECTROSURG HOLST W/BLD

## (undated) DEVICE — SUT PDS VIL/BLU DUAL ORTHO

## (undated) DEVICE — TIP YANKAUERS BULB NO VENT

## (undated) DEVICE — GLOVE BIOGEL SKINSENSE PI 8.5

## (undated) DEVICE — BNDG COFLEX FOAM LF2 ST 3X5YD

## (undated) DEVICE — BLADE SURG STAINLESS STEEL #11

## (undated) DEVICE — SYR 30CC LUER LOCK

## (undated) DEVICE — SOL NACL IRR 3000ML

## (undated) DEVICE — CANNULA SHOULDER 10/BOX

## (undated) DEVICE — SUT CHROMIC 3-0 SH 27IN GUT

## (undated) DEVICE — GLOVE BIOGEL PI MICRO INDIC 7

## (undated) DEVICE — SPLINT PLASTER F.S 4INX15IN

## (undated) DEVICE — DRESSING GAUZE OIL EMUL 3X8

## (undated) DEVICE — KIT ASSISTARM SH KN STRL

## (undated) DEVICE — SUT PROLENE 3-0 FS-2 18

## (undated) DEVICE — PAD ABDOMINAL STERILE 8X10IN

## (undated) DEVICE — JELLY SURGILUBE 5GR

## (undated) DEVICE — SYR 10CC LUER LOCK

## (undated) DEVICE — TOURNIQUET SB QC DP 18X4IN

## (undated) DEVICE — SUT 1 27IN CHROMIC GUT CT-1

## (undated) DEVICE — SEE MEDLINE ITEM 146313

## (undated) DEVICE — WIPE MICRO TIP

## (undated) DEVICE — SWAB PROCTO 16 X 1 1/2 ST 2/PK

## (undated) DEVICE — COVER CAMERA OPERATING ROOM

## (undated) DEVICE — SUT 4-0 CHROMIC GUT / P-3

## (undated) DEVICE — UNDERGLOVES BIOGEL PI SZ 7 LF

## (undated) DEVICE — SLING ARM MEDIUM FOAM STRAP

## (undated) DEVICE — CLOSURE SKIN STERI STRIP 1/2X4

## (undated) DEVICE — PAD PERI POST REPLACEMNT

## (undated) DEVICE — PAD PREP 50/CA

## (undated) DEVICE — COVER LIGHT HANDLE 80/CA

## (undated) DEVICE — PROBE ARTHO ENERGY 90 DEG

## (undated) DEVICE — SUT PROLENE 3-0 FS-1 MONO18

## (undated) DEVICE — SOCKINETTE DOUBLE PLY 4X48IN

## (undated) DEVICE — CONTAINER SPECIMEN OR STER 4OZ

## (undated) DEVICE — SUT MCRYL PLUS 4-0 PS2 27IN

## (undated) DEVICE — BLADE GATOR 4.2

## (undated) DEVICE — CORD FOR BIPOLAR FORCEPS 12

## (undated) DEVICE — BLADE SURG #15 CARBON STEEL

## (undated) DEVICE — GOWN ECLIPSE REINF LVL4 TWL XL

## (undated) DEVICE — ELECTRODE BALL RED 5MM